# Patient Record
Sex: FEMALE | Race: WHITE | NOT HISPANIC OR LATINO | Employment: OTHER | URBAN - METROPOLITAN AREA
[De-identification: names, ages, dates, MRNs, and addresses within clinical notes are randomized per-mention and may not be internally consistent; named-entity substitution may affect disease eponyms.]

---

## 2018-02-01 ENCOUNTER — OFFICE VISIT - HEALTHEAST (OUTPATIENT)
Dept: CARDIOLOGY | Facility: CLINIC | Age: 68
End: 2018-02-01

## 2018-02-01 DIAGNOSIS — R07.9 CHEST PAIN, UNSPECIFIED TYPE: ICD-10-CM

## 2018-02-01 RX ORDER — AMLODIPINE BESYLATE 10 MG/1
10 TABLET ORAL DAILY
Status: SHIPPED | COMMUNITY
Start: 2018-01-30

## 2018-02-01 ASSESSMENT — MIFFLIN-ST. JEOR: SCORE: 1134.11

## 2018-02-06 ENCOUNTER — HOSPITAL ENCOUNTER (OUTPATIENT)
Dept: CARDIOLOGY | Facility: CLINIC | Age: 68
Discharge: HOME | End: 2018-02-06
Attending: INTERNAL MEDICINE

## 2018-02-06 LAB
CV STRESS CURRENT BP HE: NORMAL
CV STRESS CURRENT HR HE: 103
CV STRESS CURRENT HR HE: 104
CV STRESS CURRENT HR HE: 105
CV STRESS CURRENT HR HE: 107
CV STRESS CURRENT HR HE: 109
CV STRESS CURRENT HR HE: 109
CV STRESS CURRENT HR HE: 111
CV STRESS CURRENT HR HE: 115
CV STRESS CURRENT HR HE: 115
CV STRESS CURRENT HR HE: 123
CV STRESS CURRENT HR HE: 124
CV STRESS CURRENT HR HE: 129
CV STRESS CURRENT HR HE: 129
CV STRESS CURRENT HR HE: 130
CV STRESS CURRENT HR HE: 138
CV STRESS CURRENT HR HE: 141
CV STRESS CURRENT HR HE: 145
CV STRESS CURRENT HR HE: 145
CV STRESS CURRENT HR HE: 91
CV STRESS CURRENT HR HE: 94
CV STRESS CURRENT HR HE: 94
CV STRESS CURRENT HR HE: 98
CV STRESS DEVIATION TIME HE: NORMAL
CV STRESS ECHO PERCENT HR HE: NORMAL
CV STRESS EXERCISE STAGE HE: NORMAL
CV STRESS FINAL RESTING BP HE: NORMAL
CV STRESS FINAL RESTING HR HE: 103
CV STRESS MAX HR HE: 145
CV STRESS MAX TREADMILL GRADE HE: 12
CV STRESS MAX TREADMILL SPEED HE: 2.5
CV STRESS PEAK DIA BP HE: NORMAL
CV STRESS PEAK SYS BP HE: NORMAL
CV STRESS PHASE HE: NORMAL
CV STRESS PROTOCOL HE: NORMAL
CV STRESS RESTING PT POSITION HE: NORMAL
CV STRESS RESTING PT POSITION HE: NORMAL
CV STRESS ST DEVIATION AMOUNT HE: NORMAL
CV STRESS ST DEVIATION ELEVATION HE: NORMAL
CV STRESS ST EVELATION AMOUNT HE: NORMAL
CV STRESS TEST TYPE HE: NORMAL
CV STRESS TOTAL STAGE TIME MIN 1 HE: NORMAL
STRESS ECHO BASELINE BP: NORMAL
STRESS ECHO BASELINE HR: 92
STRESS ECHO CALCULATED PERCENT HR: 96 %
STRESS ECHO LAST STRESS BP: NORMAL
STRESS ECHO LAST STRESS HR: 145
STRESS ECHO POST ESTIMATED WORKLOAD: 6.4
STRESS ECHO POST EXERCISE DUR MIN: 4
STRESS ECHO POST EXERCISE DUR SEC: 35
STRESS ECHO TARGET HR: 128

## 2021-05-31 VITALS — HEIGHT: 64 IN | BODY MASS INDEX: 24.24 KG/M2 | WEIGHT: 142 LBS

## 2021-06-16 PROBLEM — Z91.148 NONCOMPLIANCE WITH MEDICATION REGIMEN: Status: ACTIVE | Noted: 2018-02-01

## 2021-06-26 ENCOUNTER — HEALTH MAINTENANCE LETTER (OUTPATIENT)
Age: 71
End: 2021-06-26

## 2021-06-26 NOTE — PROGRESS NOTES
"Progress Notes by Foster Hilario MD at 2018  3:50 PM     Author: Foster Hilario MD Service: -- Author Type: Physician    Filed: 2018  4:19 PM Encounter Date: 2018 Status: Signed    : Foster Hilario MD (Physician)           Click to link to [facility] Consultation Note    Thank you, Dr. Kenn Silverio, for asking Bita Stephenson to meet with me in consultation today at the [facility] to evaluate chest discomfort.     Assessment:    1. Chest pain, unspecified type  Echo Stress Exercise       Plan:    1.  We will call also with results of her exercise echocardiographic stress test.  2.  I advised her to follow-up with her primary care team regarding her blood pressure management.    An After Visit Summary was printed and given to the patient.    Current History:    Bita states she was put on blood pressure medications in  by her primary care team.  She reports that she forgot to renew the medications and did not take it for most of January.  She then developed headache, foggy feeling in her head, and intermittent nagging discomfort in her left shoulder and trapezial area and anxiety.  She was evaluated at the emergency room, treated, and released with plans to come to the rapid access clinic.  Her amlodipine was prescribed and she resumed taking it.  She feels much better today.    Bita confides that she may have ADHD as she always has lots of energy but has difficulty focusing on a single task.  She states that she cleans her house \"every day\".    Her mother had heart surgery for an unspecified condition at age 62 and  1 month later.  Her parents  when she was 1 month old and she does not know her biological father's whereabouts or health.  Her youngest son was recently diagnosed with diabetes.    Patient Active Problem List   Diagnosis   ? Essential hypertension   ? CKD (chronic kidney " "disease) stage 3, GFR 30-59 ml/min   ? Noncompliance with medication regimen       Past Medical History:  Past Medical History:   Diagnosis Date   ? Allergic rhinitis 2006   ? CKD (chronic kidney disease) stage 3, GFR 30-59 ml/min    ? Essential hypertension    ? Noncompliance with medication regimen 2018    She failed to refill her amlodipine and was without it for a month in .       Past Surgical History:  Past Surgical History:   Procedure Laterality Date   ? NO PAST SURGERIES         Family History:  Family History   Problem Relation Age of Onset   ? Sudden death Mother 62      at home, no autopsy   ? Heart disease Mother      she had an unspecified type of heart surgery one month before her death   ? No Medical Problems Son    ? No Medical Problems Son    ? Diabetes type II Son    ? No Medical Problems Daughter    ? No Medical Problems Daughter        Social History:   reports that she quit smoking about 17 years ago. Her smoking use included Cigarettes. She has a 42.00 pack-year smoking history. She has never used smokeless tobacco. She reports that she uses illicit drugs, including Marijuana. She reports that she does not drink alcohol.    Medications:  Outpatient Encounter Prescriptions as of 2018   Medication Sig Dispense Refill   ? amLODIPine (NORVASC) 10 MG tablet Take 10 mg by mouth.       No facility-administered encounter medications on file as of 2018.        Allergies:  Paroxetine and Valium [diazepam]    Review of Systems:     General: WNL  Eyes: WNL  Ears/Nose/Throat: WNL  Lungs: Snoring  Heart: Chest Pain, Arm Pain, Irregular Heartbeat  Stomach: Diarrhea, Heartburn  Bladder: Frequent Urination at Night  Muscle/Joints: Muscle Pain  Skin: WNL  Nervous System: WNL  Mental Health: Anxiety     Blood: WNL    Objective:    Wt Readings from Last 5 Encounters:   18 142 lb (64.4 kg)   18 142 lb (64.4 kg)      5' 4\" (1.626 m)  Body mass index is 24.37 " "kg/(m^2).  BP (!) 150/92 (Patient Site: Left Arm, Patient Position: Sitting, Cuff Size: Adult Regular)  Pulse 96  Resp 18  Ht 5' 4\" (1.626 m)  Wt 142 lb (64.4 kg)  BMI 24.37 kg/m2     Physical Exam:    General Appearance: Alert and not in distress   HEENT: No scleral icterus; the tongue is midline and the mucous membranes are pink and moist   Neck: No cervical bruits, adenopathy, or thyromegaly; jugular venous pressure is less than 5 cm   Chest: The spine is straight and the chest is symmetric   Lungs: Respirations are unlabored; the lungs are clear to auscultation   Cardiovasular: Auscultation reveals normal first and second heart sounds with no murmurs, rubs, or gallops; the carotid, radial, femoral, and posterior tibial pulses are intact   Abdomen: No organomegaly, masses, bruits, or tenderness; bowel sounds are present   Extremities: No cyanosis, clubbing or edema   Skin: No xanthelasma   Neurologic: Mood and affect are appropriate       Cardiac testing:    EKG: Sinus rhythm, normal EKG per my personal review.     Imaging:    No results found.    Lab Review:    Lab Results   Component Value Date     01/30/2018    K 4.0 01/30/2018     (H) 01/30/2018    CO2 21 (L) 01/30/2018    BUN 24 (H) 01/30/2018    CREATININE 1.20 (H) 01/30/2018    CALCIUM 9.5 01/30/2018     Lab Results   Component Value Date    WBC 8.2 01/30/2018    HGB 13.1 01/30/2018    HCT 40.1 01/30/2018    MCV 83 01/30/2018     01/30/2018           Much or all of the text in this note was generated through the use of the Dragon Dictate voice-to-text software. Errors in spelling or words which seem out of context are unintentional. Sound alike errors, in particular, may have escaped editing.              "

## 2021-08-19 ENCOUNTER — TRANSCRIBE ORDERS (OUTPATIENT)
Dept: OTHER | Age: 71
End: 2021-08-19

## 2021-08-19 DIAGNOSIS — H53.9 VISUAL DISTURBANCE: Primary | ICD-10-CM

## 2021-10-16 ENCOUNTER — HEALTH MAINTENANCE LETTER (OUTPATIENT)
Age: 71
End: 2021-10-16

## 2021-12-06 ENCOUNTER — APPOINTMENT (OUTPATIENT)
Dept: CT IMAGING | Facility: CLINIC | Age: 71
DRG: 689 | End: 2021-12-06
Attending: EMERGENCY MEDICINE
Payer: COMMERCIAL

## 2021-12-06 ENCOUNTER — HOSPITAL ENCOUNTER (INPATIENT)
Facility: CLINIC | Age: 71
LOS: 2 days | Discharge: HOME | DRG: 689 | End: 2021-12-08
Attending: EMERGENCY MEDICINE | Admitting: HOSPITALIST
Payer: COMMERCIAL

## 2021-12-06 DIAGNOSIS — R39.9 URINARY TRACT INFECTION SYMPTOMS: Primary | ICD-10-CM

## 2021-12-06 DIAGNOSIS — R50.9 FEVER, UNSPECIFIED FEVER CAUSE: ICD-10-CM

## 2021-12-06 DIAGNOSIS — R41.0 CONFUSION: ICD-10-CM

## 2021-12-06 DIAGNOSIS — R05.9 COUGH: ICD-10-CM

## 2021-12-06 DIAGNOSIS — N30.00 ACUTE CYSTITIS WITHOUT HEMATURIA: ICD-10-CM

## 2021-12-06 DIAGNOSIS — F12.90 MARIJUANA USE: ICD-10-CM

## 2021-12-06 PROBLEM — D72.825 BANDEMIA: Status: ACTIVE | Noted: 2021-12-06

## 2021-12-06 PROBLEM — L90.0 LICHEN SCLEROSUS: Status: ACTIVE | Noted: 2019-03-04

## 2021-12-06 LAB
ALBUMIN SERPL-MCNC: 3.8 G/DL (ref 3.5–5)
ALBUMIN UR-MCNC: 70 MG/DL
ALP SERPL-CCNC: 62 U/L (ref 45–120)
ALT SERPL W P-5'-P-CCNC: 10 U/L (ref 0–45)
AMPHETAMINES UR QL SCN: ABNORMAL
ANION GAP SERPL CALCULATED.3IONS-SCNC: 12 MMOL/L (ref 5–18)
APPEARANCE UR: CLEAR
AST SERPL W P-5'-P-CCNC: 20 U/L (ref 0–40)
ATRIAL RATE - MUSE: 107 BPM
BARBITURATES UR QL: ABNORMAL
BASE EXCESS BLDV CALC-SCNC: -0.7 MMOL/L
BASOPHILS # BLD AUTO: 0 10E3/UL (ref 0–0.2)
BASOPHILS NFR BLD AUTO: 0 %
BENZODIAZ UR QL: ABNORMAL
BILIRUB DIRECT SERPL-MCNC: 0.1 MG/DL
BILIRUB SERPL-MCNC: 0.3 MG/DL (ref 0–1)
BILIRUB UR QL STRIP: NEGATIVE
BUN SERPL-MCNC: 24 MG/DL (ref 8–28)
C REACTIVE PROTEIN LHE: 0.6 MG/DL (ref 0–0.8)
C REACTIVE PROTEIN LHE: 1 MG/DL (ref 0–0.8)
CALCIUM SERPL-MCNC: 9.1 MG/DL (ref 8.5–10.5)
CANNABINOIDS UR QL SCN: ABNORMAL
CHLORIDE BLD-SCNC: 105 MMOL/L (ref 98–107)
CO2 SERPL-SCNC: 21 MMOL/L (ref 22–31)
COCAINE UR QL: ABNORMAL
COLOR UR AUTO: COLORLESS
CREAT SERPL-MCNC: 1.52 MG/DL (ref 0.6–1.1)
CREAT UR-MCNC: 42 MG/DL
DIASTOLIC BLOOD PRESSURE - MUSE: 71 MMHG
EOSINOPHIL # BLD AUTO: 0 10E3/UL (ref 0–0.7)
EOSINOPHIL NFR BLD AUTO: 0 %
ERYTHROCYTE [DISTWIDTH] IN BLOOD BY AUTOMATED COUNT: 14.3 % (ref 10–15)
ETHANOL SERPL-MCNC: <10 MG/DL
FLUAV RNA SPEC QL NAA+PROBE: NEGATIVE
FLUBV RNA RESP QL NAA+PROBE: NEGATIVE
GFR SERPL CREATININE-BSD FRML MDRD: 34 ML/MIN/1.73M2
GLUCOSE BLD-MCNC: 126 MG/DL (ref 70–125)
GLUCOSE UR STRIP-MCNC: NEGATIVE MG/DL
HCO3 BLDV-SCNC: 22 MMOL/L (ref 24–30)
HCT VFR BLD AUTO: 36.6 % (ref 35–47)
HGB BLD-MCNC: 11.5 G/DL (ref 11.7–15.7)
HGB UR QL STRIP: ABNORMAL
HOLD SPECIMEN: NORMAL
IMM GRANULOCYTES # BLD: 0.1 10E3/UL
IMM GRANULOCYTES NFR BLD: 1 %
INTERPRETATION ECG - MUSE: NORMAL
KETONES UR STRIP-MCNC: 10 MG/DL
LACTATE SERPL-SCNC: 1.5 MMOL/L (ref 0.7–2)
LEUKOCYTE ESTERASE UR QL STRIP: NEGATIVE
LIPASE SERPL-CCNC: 29 U/L (ref 0–52)
LYMPHOCYTES # BLD AUTO: 0.5 10E3/UL (ref 0.8–5.3)
LYMPHOCYTES NFR BLD AUTO: 3 %
MAGNESIUM SERPL-MCNC: 2 MG/DL (ref 1.8–2.6)
MAGNESIUM SERPL-MCNC: 2.1 MG/DL (ref 1.8–2.6)
MCH RBC QN AUTO: 26.5 PG (ref 26.5–33)
MCHC RBC AUTO-ENTMCNC: 31.4 G/DL (ref 31.5–36.5)
MCV RBC AUTO: 84 FL (ref 78–100)
METHADONE UR QL SCN: ABNORMAL
MONOCYTES # BLD AUTO: 0.2 10E3/UL (ref 0–1.3)
MONOCYTES NFR BLD AUTO: 1 %
MUCOUS THREADS #/AREA URNS LPF: PRESENT /LPF
NEUTROPHILS # BLD AUTO: 16.6 10E3/UL (ref 1.6–8.3)
NEUTROPHILS NFR BLD AUTO: 95 %
NITRATE UR QL: NEGATIVE
NRBC # BLD AUTO: 0 10E3/UL
NRBC BLD AUTO-RTO: 0 /100
OPIATES UR QL SCN: ABNORMAL
OXYCODONE UR QL: ABNORMAL
OXYHGB MFR BLDV: 28.9 % (ref 70–75)
P AXIS - MUSE: 72 DEGREES
PCO2 BLDV: 44 MM HG (ref 35–50)
PCP UR QL SCN: ABNORMAL
PH BLDV: 7.36 [PH] (ref 7.35–7.45)
PH UR STRIP: 6 [PH] (ref 5–7)
PHOSPHATE SERPL-MCNC: 2.7 MG/DL (ref 2.5–4.5)
PLATELET # BLD AUTO: 313 10E3/UL (ref 150–450)
PO2 BLDV: 18 MM HG (ref 25–47)
POTASSIUM BLD-SCNC: 4.4 MMOL/L (ref 3.5–5)
PR INTERVAL - MUSE: 150 MS
PROCALCITONIN SERPL-MCNC: 0.07 NG/ML (ref 0–0.49)
PROT SERPL-MCNC: 7.5 G/DL (ref 6–8)
QRS DURATION - MUSE: 84 MS
QT - MUSE: 342 MS
QTC - MUSE: 456 MS
R AXIS - MUSE: -54 DEGREES
RBC # BLD AUTO: 4.34 10E6/UL (ref 3.8–5.2)
RBC URINE: 15 /HPF
SAO2 % BLDV: 29.4 % (ref 70–75)
SARS-COV-2 RNA RESP QL NAA+PROBE: NEGATIVE
SODIUM SERPL-SCNC: 138 MMOL/L (ref 136–145)
SP GR UR STRIP: 1.01 (ref 1–1.03)
SQUAMOUS EPITHELIAL: <1 /HPF
SYSTOLIC BLOOD PRESSURE - MUSE: 139 MMHG
T AXIS - MUSE: 77 DEGREES
TROPONIN I SERPL-MCNC: 0.27 NG/ML (ref 0–0.29)
TSH SERPL DL<=0.005 MIU/L-ACNC: 1.35 UIU/ML (ref 0.3–5)
TSH SERPL DL<=0.005 MIU/L-ACNC: 2.37 UIU/ML (ref 0.3–5)
UROBILINOGEN UR STRIP-MCNC: <2 MG/DL
VENTRICULAR RATE- MUSE: 107 BPM
VIT B12 SERPL-MCNC: 330 PG/ML (ref 213–816)
WBC # BLD AUTO: 17.5 10E3/UL (ref 4–11)
WBC URINE: 1 /HPF

## 2021-12-06 PROCEDURE — 120N000001 HC R&B MED SURG/OB

## 2021-12-06 PROCEDURE — 250N000011 HC RX IP 250 OP 636: Performed by: EMERGENCY MEDICINE

## 2021-12-06 PROCEDURE — 82746 ASSAY OF FOLIC ACID SERUM: CPT | Performed by: HOSPITALIST

## 2021-12-06 PROCEDURE — 250N000013 HC RX MED GY IP 250 OP 250 PS 637: Performed by: EMERGENCY MEDICINE

## 2021-12-06 PROCEDURE — 93005 ELECTROCARDIOGRAM TRACING: CPT | Performed by: EMERGENCY MEDICINE

## 2021-12-06 PROCEDURE — 82805 BLOOD GASES W/O2 SATURATION: CPT | Performed by: EMERGENCY MEDICINE

## 2021-12-06 PROCEDURE — 84100 ASSAY OF PHOSPHORUS: CPT | Performed by: HOSPITALIST

## 2021-12-06 PROCEDURE — 83735 ASSAY OF MAGNESIUM: CPT | Performed by: EMERGENCY MEDICINE

## 2021-12-06 PROCEDURE — 71250 CT THORAX DX C-: CPT

## 2021-12-06 PROCEDURE — 83690 ASSAY OF LIPASE: CPT | Performed by: EMERGENCY MEDICINE

## 2021-12-06 PROCEDURE — 82077 ASSAY SPEC XCP UR&BREATH IA: CPT | Performed by: EMERGENCY MEDICINE

## 2021-12-06 PROCEDURE — 36415 COLL VENOUS BLD VENIPUNCTURE: CPT | Performed by: EMERGENCY MEDICINE

## 2021-12-06 PROCEDURE — 87086 URINE CULTURE/COLONY COUNT: CPT | Performed by: EMERGENCY MEDICINE

## 2021-12-06 PROCEDURE — 36415 COLL VENOUS BLD VENIPUNCTURE: CPT | Performed by: HOSPITALIST

## 2021-12-06 PROCEDURE — 86141 C-REACTIVE PROTEIN HS: CPT | Performed by: HOSPITALIST

## 2021-12-06 PROCEDURE — 84443 ASSAY THYROID STIM HORMONE: CPT | Performed by: EMERGENCY MEDICINE

## 2021-12-06 PROCEDURE — 80048 BASIC METABOLIC PNL TOTAL CA: CPT | Performed by: EMERGENCY MEDICINE

## 2021-12-06 PROCEDURE — 70450 CT HEAD/BRAIN W/O DYE: CPT

## 2021-12-06 PROCEDURE — 84484 ASSAY OF TROPONIN QUANT: CPT | Performed by: EMERGENCY MEDICINE

## 2021-12-06 PROCEDURE — 84443 ASSAY THYROID STIM HORMONE: CPT | Performed by: HOSPITALIST

## 2021-12-06 PROCEDURE — C9803 HOPD COVID-19 SPEC COLLECT: HCPCS

## 2021-12-06 PROCEDURE — 258N000003 HC RX IP 258 OP 636: Performed by: HOSPITALIST

## 2021-12-06 PROCEDURE — 96366 THER/PROPH/DIAG IV INF ADDON: CPT

## 2021-12-06 PROCEDURE — 99223 1ST HOSP IP/OBS HIGH 75: CPT | Performed by: HOSPITALIST

## 2021-12-06 PROCEDURE — 82607 VITAMIN B-12: CPT | Performed by: HOSPITALIST

## 2021-12-06 PROCEDURE — 85025 COMPLETE CBC W/AUTO DIFF WBC: CPT | Performed by: EMERGENCY MEDICINE

## 2021-12-06 PROCEDURE — 99285 EMERGENCY DEPT VISIT HI MDM: CPT | Mod: 25

## 2021-12-06 PROCEDURE — 96365 THER/PROPH/DIAG IV INF INIT: CPT

## 2021-12-06 PROCEDURE — 258N000003 HC RX IP 258 OP 636: Performed by: EMERGENCY MEDICINE

## 2021-12-06 PROCEDURE — 83605 ASSAY OF LACTIC ACID: CPT | Performed by: EMERGENCY MEDICINE

## 2021-12-06 PROCEDURE — 82248 BILIRUBIN DIRECT: CPT | Performed by: EMERGENCY MEDICINE

## 2021-12-06 PROCEDURE — 87636 SARSCOV2 & INF A&B AMP PRB: CPT | Performed by: EMERGENCY MEDICINE

## 2021-12-06 PROCEDURE — 84145 PROCALCITONIN (PCT): CPT | Performed by: EMERGENCY MEDICINE

## 2021-12-06 PROCEDURE — 80307 DRUG TEST PRSMV CHEM ANLYZR: CPT | Performed by: EMERGENCY MEDICINE

## 2021-12-06 PROCEDURE — 87040 BLOOD CULTURE FOR BACTERIA: CPT | Performed by: EMERGENCY MEDICINE

## 2021-12-06 PROCEDURE — 96361 HYDRATE IV INFUSION ADD-ON: CPT

## 2021-12-06 PROCEDURE — 83735 ASSAY OF MAGNESIUM: CPT | Performed by: HOSPITALIST

## 2021-12-06 PROCEDURE — 86141 C-REACTIVE PROTEIN HS: CPT | Performed by: EMERGENCY MEDICINE

## 2021-12-06 PROCEDURE — 81003 URINALYSIS AUTO W/O SCOPE: CPT | Performed by: EMERGENCY MEDICINE

## 2021-12-06 RX ORDER — ACETAMINOPHEN 650 MG/1
650 SUPPOSITORY RECTAL EVERY 6 HOURS PRN
Status: DISCONTINUED | OUTPATIENT
Start: 2021-12-06 | End: 2021-12-08 | Stop reason: HOSPADM

## 2021-12-06 RX ORDER — SODIUM CHLORIDE 9 MG/ML
INJECTION, SOLUTION INTRAVENOUS CONTINUOUS
Status: DISCONTINUED | OUTPATIENT
Start: 2021-12-06 | End: 2021-12-08 | Stop reason: HOSPADM

## 2021-12-06 RX ORDER — FAMOTIDINE 20 MG/1
20 TABLET, FILM COATED ORAL DAILY
Status: DISCONTINUED | OUTPATIENT
Start: 2021-12-07 | End: 2021-12-08 | Stop reason: HOSPADM

## 2021-12-06 RX ORDER — LANOLIN ALCOHOL/MO/W.PET/CERES
3 CREAM (GRAM) TOPICAL
COMMUNITY

## 2021-12-06 RX ORDER — ONDANSETRON 2 MG/ML
4 INJECTION INTRAMUSCULAR; INTRAVENOUS EVERY 6 HOURS PRN
Status: DISCONTINUED | OUTPATIENT
Start: 2021-12-06 | End: 2021-12-08 | Stop reason: HOSPADM

## 2021-12-06 RX ORDER — ACETAMINOPHEN 325 MG/1
975 TABLET ORAL ONCE
Status: DISCONTINUED | OUTPATIENT
Start: 2021-12-06 | End: 2021-12-06

## 2021-12-06 RX ORDER — ACETAMINOPHEN 325 MG/1
650 TABLET ORAL EVERY 6 HOURS PRN
Status: DISCONTINUED | OUTPATIENT
Start: 2021-12-06 | End: 2021-12-08 | Stop reason: HOSPADM

## 2021-12-06 RX ORDER — POLYETHYLENE GLYCOL 3350 17 G/17G
17 POWDER, FOR SOLUTION ORAL DAILY PRN
Status: DISCONTINUED | OUTPATIENT
Start: 2021-12-06 | End: 2021-12-08 | Stop reason: HOSPADM

## 2021-12-06 RX ORDER — CEFTRIAXONE 1 G/1
1 INJECTION, POWDER, FOR SOLUTION INTRAMUSCULAR; INTRAVENOUS EVERY 24 HOURS
Status: DISCONTINUED | OUTPATIENT
Start: 2021-12-07 | End: 2021-12-08 | Stop reason: HOSPADM

## 2021-12-06 RX ORDER — CEFTRIAXONE 2 G/1
2 INJECTION, POWDER, FOR SOLUTION INTRAMUSCULAR; INTRAVENOUS ONCE
Status: COMPLETED | OUTPATIENT
Start: 2021-12-06 | End: 2021-12-06

## 2021-12-06 RX ORDER — LIDOCAINE 40 MG/G
CREAM TOPICAL
Status: DISCONTINUED | OUTPATIENT
Start: 2021-12-06 | End: 2021-12-08 | Stop reason: HOSPADM

## 2021-12-06 RX ORDER — FAMOTIDINE 20 MG/1
20 TABLET, FILM COATED ORAL 2 TIMES DAILY PRN
COMMUNITY

## 2021-12-06 RX ORDER — ONDANSETRON 4 MG/1
4 TABLET, ORALLY DISINTEGRATING ORAL EVERY 6 HOURS PRN
Status: DISCONTINUED | OUTPATIENT
Start: 2021-12-06 | End: 2021-12-08 | Stop reason: HOSPADM

## 2021-12-06 RX ORDER — ACETAMINOPHEN 650 MG/1
650 SUPPOSITORY RECTAL ONCE
Status: COMPLETED | OUTPATIENT
Start: 2021-12-06 | End: 2021-12-06

## 2021-12-06 RX ORDER — AMLODIPINE BESYLATE 10 MG/1
10 TABLET ORAL DAILY
Status: DISCONTINUED | OUTPATIENT
Start: 2021-12-07 | End: 2021-12-08 | Stop reason: HOSPADM

## 2021-12-06 RX ORDER — LORAZEPAM 2 MG/ML
1 INJECTION INTRAMUSCULAR ONCE
Status: COMPLETED | OUTPATIENT
Start: 2021-12-06 | End: 2021-12-06

## 2021-12-06 RX ADMIN — CEFTRIAXONE SODIUM 2 G: 2 INJECTION, POWDER, FOR SOLUTION INTRAMUSCULAR; INTRAVENOUS at 19:10

## 2021-12-06 RX ADMIN — SODIUM CHLORIDE 500 ML: 9 INJECTION, SOLUTION INTRAVENOUS at 17:53

## 2021-12-06 RX ADMIN — ACETAMINOPHEN 650 MG: 650 SUPPOSITORY RECTAL at 17:53

## 2021-12-06 RX ADMIN — LORAZEPAM 1 MG: 2 INJECTION INTRAMUSCULAR; INTRAVENOUS at 14:55

## 2021-12-06 RX ADMIN — SODIUM CHLORIDE: 9 INJECTION, SOLUTION INTRAVENOUS at 22:24

## 2021-12-06 ASSESSMENT — ACTIVITIES OF DAILY LIVING (ADL)
ADLS_ACUITY_SCORE: 5
DEPENDENT_IADLS:: INDEPENDENT

## 2021-12-06 ASSESSMENT — ENCOUNTER SYMPTOMS: COUGH: 1

## 2021-12-06 ASSESSMENT — MIFFLIN-ST. JEOR: SCORE: 1025.19

## 2021-12-06 NOTE — ED PROVIDER NOTES
"EMERGENCY DEPARTMENT ENCOUNTER      NAME: Bita Stephenson  AGE: 71 year old female  YOB: 1950  MRN: [id number]  EVALUATION DATE & TIME: 12/6/2021  1:23 PM    PCP: Rebekah Cain    ED PROVIDER: Nikunj Ramos M.D.      Chief Complaint   Patient presents with     Altered Mental Status         IMPRESSION  1. Confusion    2. Cough    3. Fever, unspecified fever cause    4. Marijuana use        PLAN  - admit to hospitalist for further care; med/surg admit    ED COURSE & MEDICAL DECISION MAKING    ED Course as of 12/06/21 1755   Mon Dec 06, 2021   1336 71yoF with history of HTN, CKD3, s/p COVID-19 vaccine x2 presenting per EMS from home (lives with daughter) for evaluation of confusion and cough. Daughter reports patient did not get up for breakfast as usual this morning (went to bed last night feeling fine with no symptoms yesterday); then found in bed incontinence of stool & urine with a dry cough. Patient stated to her that \"she felt sick\" and daughter should call 911. EMS called; hemodynamically stable en route to the ED. Daughter notes patient is not acting her usual self still; now not talking to daughter but does look at her.    Temp 38.2C on presentation with HR 110s, satting well on room air. Makes eye contact on exam and turns over in bed during exam; wants to be left alone. Moves head freely with no meningismus. Has clear lungs with normal work of breathing, benign abdomen with no tenderness, no peripheral edema or calf tenderness, no rash. Concern for infectious process high; COVID-19 high given cough and fever. Mild encephalopathy but moves head freely with no meningismus; low concern for meningitis. Will obtain CT scans given AMS along with COVID-19 swab, blood, urine. Will give Tylenol for fever; suspect this driving mild tachycardia. Will defer initial IVF with no clinical dehydration on exam and high concern for COVID-19. Daughter comfortable with this plan; no further questions at " "this time.   1409 Lactate 1.5; argues against severe sepsis or septic shock currently ongoing.   1448 Patient hitting at staff with attempts to lay her flat in bed. BP stable and normal work of breathing. Will give Ativan to facilitate obtaining CT scans.   1457 UA clear with no UTI.   1513 VBG unremarkable with pH 7.36 with pCO2 44; not CO2 narcosis. Labs otherwise with baseline creatinine at 1.5 with no glaring electrolyte abnormality, normal bilirubin/LFTs/lipase, TSH within normal limits. WBC 17.5 but CRP just 0.6 and procal 0.07; argues against bacteremia or bacterial pneumonia.   1550 COVID-19/influenza swab negative; has just had <1 day of symptoms though with otherwise no clear etiology to fever. May be false negative.   1609 CT head with no acute abnormality.   1645 CT chest/abdomen/pelvis with no acute abnormality or explanatory pathology either; no infiltrate, pulmonary edema, colitis, diverticulitis, perforation, SBO; unremarkable. Patient resting comfortably on recheck; says \"go away\", bundles up in sheets, moves all limbs, still moves head freely with no meningismus.   1731 I called lab about troponin (drawn at 1:53pm but not yet in process); tech states they will start running it now.   1750 UDS with cannabinoids; may be playing a role. Consulted hospitalist for admission; they agreed. Did request that Rocephin be given for possible UTI; this was done.       1:37 PM I met with the patient for the initial interview and physical examination. Discussed plan for treatment and workup in the ED.      I wore the following PPE during this patient encounter:  N95 mask, face shield w/ eye protection, gloves, and gown.    MEDICATIONS GIVEN IN THE EMERGENCY DEPARTMENT  Medications   cefTRIAXone (ROCEPHIN) 2 g vial to attach to  ml bag for ADULTS or NS 50 ml bag for PEDS (has no administration in time range)   LORazepam (ATIVAN) injection 1 mg (1 mg Intravenous Given 12/6/21 1455)   acetaminophen (TYLENOL) " "Suppository 650 mg (650 mg Rectal Given 12/6/21 1753)   0.9% sodium chloride BOLUS (500 mLs Intravenous New Bag 12/6/21 1753)             =================================================================      HPI  Patient information was obtained from: Patient's daughter    Use of : N/A         Bita Stephenson is a 71 year old female with a pertinent history of HTN and CKD who presents to this ED via EMS for evaluation of altered mental status.    Daughter reports patient did not get up for breakfast as usual this morning (went to bed last night feeling fine with no symptoms yesterday); then found in bed incontinence of stool & urine with a dry cough. Patient stated to her that \"she felt sick\" and daughter should call 911. EMS called; hemodynamically stable en route to the ED. Daughter notes patient is not acting her usual self still; now not talking to daughter but does look at her.    REVIEW OF SYSTEMS   Review of Systems   Unable to perform ROS: Mental status change   Respiratory: Positive for cough.    Genitourinary:        Positive for incontinence of stool & urine in bed this morning.       --------------- MEDICAL HISTORY ---------------  PAST MEDICAL HISTORY:  No past medical history on file.  Patient Active Problem List   Diagnosis     Essential hypertension     CKD (chronic kidney disease) stage 3, GFR 30-59 ml/min (H)     Noncompliance with medication regimen     Allergic rhinitis     Lichen sclerosus     Thoracic sprain     Cough     Confusion     Fever, unspecified fever cause       PAST SURGICAL HISTORY:  Past Surgical History:   Procedure Laterality Date     NO PAST SURGERIES         CURRENT MEDICATIONS:      Current Facility-Administered Medications:      cefTRIAXone (ROCEPHIN) 2 g vial to attach to  ml bag for ADULTS or NS 50 ml bag for PEDS, 2 g, Intravenous, Once, Nikunj Ramos MD    Current Outpatient Medications:      amLODIPine (NORVASC) 10 MG tablet, Take 10 mg by mouth " "daily , Disp: , Rfl:      famotidine (PEPCID) 20 MG tablet, Take 20 mg by mouth 2 times daily as needed, Disp: , Rfl:      melatonin 3 MG tablet, Take 3 mg by mouth nightly as needed for sleep, Disp: , Rfl:     ALLERGIES:  Allergies   Allergen Reactions     Paroxetine Itching     tingling     Sulfa Drugs      Other reaction(s): *Unknown     Valium [Diazepam] Itching       FAMILY HISTORY:  Family History   Problem Relation Age of Onset     Sudden Death Mother 62.00         at home, no autopsy     Heart Disease Mother         she had an unspecified type of heart surgery one month before her death     No Known Problems Son      No Known Problems Son      Diabetes Type 2  Son      No Known Problems Daughter      No Known Problems Daughter        SOCIAL HISTORY:   Social History     Socioeconomic History     Marital status:      Spouse name: Not on file     Number of children: 5     Years of education: Not on file     Highest education level: Not on file   Occupational History     Not on file   Tobacco Use     Smoking status: Former Smoker     Packs/day: 1.50     Years: 28.00     Pack years: 42.00     Types: Cigarettes, Cigarettes     Quit date: 2001     Years since quittin.8     Smokeless tobacco: Never Used   Substance and Sexual Activity     Alcohol use: No     Comment: Alcoholic Drinks/day: quit drinking in 2017     Drug use: Yes     Types: Marijuana     Comment: Drug use: \"occasional\"     Sexual activity: Not Currently     Partners: Male   Other Topics Concern     Not on file   Social History Narrative    Her youngest daughter, Alice, lives with her in [facility] home.     Social Determinants of Health     Financial Resource Strain: Not on file   Food Insecurity: Not on file   Transportation Needs: Not on file   Physical Activity: Not on file   Stress: Not on file   Social Connections: Not on file   Intimate Partner Violence: Not on file   Housing Stability: Not on file         --------------- " "PHYSICAL EXAM ---------------  Nursing notes and vitals reviewed by me.  VITALS:  Vitals:    12/06/21 1320 12/06/21 1449 12/06/21 1500   BP:  139/71    Pulse: 115     Resp: 16     Temp: (!) 100.8  F (38.2  C)     TempSrc: Temporal     SpO2: 98%     Height:   1.588 m (5' 2.5\")       PHYSICAL EXAM:    General:  Elderly female lying comfortably in bed, no distress, wants to roll over to prone position during exam (moans & shakes her head with attempts at moving her during exam)  Eyes:  conjunctivae clear, conjugate gaze, PERRL @ 2mm, no nystagmus  HENT:  atraumatic, nose with no rhinorrhea, oropharynx clear, moist mucous membranes  Neck:  no meningismus  Cardiovascular:  HR 110s during exam, regular rhythm, no murmurs, brisk cap refill  Chest:  no chest wall tenderness  Pulmonary:  no stridor, normal work of breathing, clear lungs bilaterally  Abdomen:  soft, nondistended, nontender  :  no CVA tenderness  Back:  no midline spinal tenderness  Musculoskeletal:  no pretibial edema, no calf tenderness. Gross ROM intact to joints of extremities with no obvious deformities.  Skin:  warm, dry, no rash  Neuro:  awake, makes good eye contact, moves all limbs, GCS 11 (E4, M5, V2), no tremor, no ankle clonus  Psych:  calm      --------------- RESULTS ---------------  EKG:    Reviewed and interpreted by me.  sinus tachycardia at 107bpm, no ST or T wave changes, normal intervals  Increased HR from prior (98bpm) with no other notable changes from 1/30/2018  My read.    LAB:  Reviewed and interpreted by me.  Results for orders placed or performed during the hospital encounter of 12/06/21   Head CT w/o contrast    Impression    IMPRESSION:  1.  No CT evidence for acute intracranial process.  2.  Brain atrophy and presumed chronic microvascular ischemic changes as above.   CT Chest Abdomen Pelvis w/o Contrast    Impression    IMPRESSION:  1.  The lungs are clear of focal consolidation. There is no pneumothorax, pleural effusion or " thoracic aortic aneurysm. Mild paraseptal emphysema.  2.  No bowel obstruction, free intraperitoneal air, appendicitis diverticulitis or abscess. There is sigmoid diverticulosis.  3.  No ovarian cyst or radiopaque gallstone or ureteral stone. No abdominal aortic aneurysm.  4.  Mild degenerative change lower lumbar spine.   Symptomatic Influenza A/B & SARS-CoV2 (COVID-19) Virus PCR Multiplex Nasopharyngeal    Specimen: Nasopharyngeal; Swab   Result Value Ref Range    Influenza A PCR Negative Negative    Influenza B PCR Negative Negative    SARS CoV2 PCR Negative Negative   Basic metabolic panel   Result Value Ref Range    Sodium 138 136 - 145 mmol/L    Potassium 4.4 3.5 - 5.0 mmol/L    Chloride 105 98 - 107 mmol/L    Carbon Dioxide (CO2) 21 (L) 22 - 31 mmol/L    Anion Gap 12 5 - 18 mmol/L    Urea Nitrogen 24 8 - 28 mg/dL    Creatinine 1.52 (H) 0.60 - 1.10 mg/dL    Calcium 9.1 8.5 - 10.5 mg/dL    Glucose 126 (H) 70 - 125 mg/dL    GFR Estimate 34 (L) >60 mL/min/1.73m2   Hepatic function panel   Result Value Ref Range    Bilirubin Total 0.3 0.0 - 1.0 mg/dL    Bilirubin Direct 0.1 <=0.5 mg/dL    Protein Total 7.5 6.0 - 8.0 g/dL    Albumin 3.8 3.5 - 5.0 g/dL    Alkaline Phosphatase 62 45 - 120 U/L    AST 20 0 - 40 U/L    ALT 10 0 - 45 U/L   Result Value Ref Range    Magnesium 2.0 1.8 - 2.6 mg/dL   TSH with free T4 reflex   Result Value Ref Range    TSH 2.37 0.30 - 5.00 uIU/mL   CRP inflammation   Result Value Ref Range    CRP 0.6 0.0-<0.8 mg/dL   Result Value Ref Range    Procalcitonin 0.07 0.00 - 0.49 ng/mL   Blood gas venous   Result Value Ref Range    pH Venous 7.36 7.35 - 7.45    pCO2 Venous 44 35 - 50 mm Hg    pO2 Venous 18 (L) 25 - 47 mm Hg    Bicarbonate Venous 22 (L) 24 - 30 mmol/L    Base Excess/Deficit (+/-) -0.7   mmol/L    Oxyhemoglobin Venous 28.9 (L) 70.0 - 75.0 %    O2 Sat, Venous 29.4 (L) 70.0 - 75.0 %   Lactic acid whole blood   Result Value Ref Range    Lactic Acid 1.5 0.7 - 2.0 mmol/L   Result Value Ref  Range    Lipase 29 0 - 52 U/L   UA with Microscopic reflex to Culture    Specimen: Urine, Catheter   Result Value Ref Range    Color Urine Colorless Colorless, Straw, Light Yellow, Yellow    Appearance Urine Clear Clear    Glucose Urine Negative Negative mg/dL    Bilirubin Urine Negative Negative    Ketones Urine 10  (A) Negative mg/dL    Specific Gravity Urine 1.011 1.001 - 1.030    Blood Urine 0.5 mg/dL (A) Negative    pH Urine 6.0 5.0 - 7.0    Protein Albumin Urine 70  (A) Negative mg/dL    Urobilinogen Urine <2.0 <2.0 mg/dL    Nitrite Urine Negative Negative    Leukocyte Esterase Urine Negative Negative    Mucus Urine Present (A) None Seen /LPF    RBC Urine 15 (H) <=2 /HPF    WBC Urine 1 <=5 /HPF    Squamous Epithelials Urine <1 <=1 /HPF   CBC with platelets and differential   Result Value Ref Range    WBC Count 17.5 (H) 4.0 - 11.0 10e3/uL    RBC Count 4.34 3.80 - 5.20 10e6/uL    Hemoglobin 11.5 (L) 11.7 - 15.7 g/dL    Hematocrit 36.6 35.0 - 47.0 %    MCV 84 78 - 100 fL    MCH 26.5 26.5 - 33.0 pg    MCHC 31.4 (L) 31.5 - 36.5 g/dL    RDW 14.3 10.0 - 15.0 %    Platelet Count 313 150 - 450 10e3/uL    % Neutrophils 95 %    % Lymphocytes 3 %    % Monocytes 1 %    % Eosinophils 0 %    % Basophils 0 %    % Immature Granulocytes 1 %    NRBCs per 100 WBC 0 <1 /100    Absolute Neutrophils 16.6 (H) 1.6 - 8.3 10e3/uL    Absolute Lymphocytes 0.5 (L) 0.8 - 5.3 10e3/uL    Absolute Monocytes 0.2 0.0 - 1.3 10e3/uL    Absolute Eosinophils 0.0 0.0 - 0.7 10e3/uL    Absolute Basophils 0.0 0.0 - 0.2 10e3/uL    Absolute Immature Granulocytes 0.1 <=0.4 10e3/uL    Absolute NRBCs 0.0 10e3/uL   Result Value Ref Range    Troponin I 0.27 0.00 - 0.29 ng/mL   Drugs of Abuse 1+ Panel, Urine ([facility])   Result Value Ref Range    Amphetamines Urine Screen Negative Screen Negative    Benzodiazepines Urine Screen Negative Screen Negative    Opiates Urine Screen Negative Screen Negative    PCP Urine Screen Negative Screen Negative     Cannabinoids Urine Screen Positive (A) Screen Negative    Barbiturates Urine Screen Negative Screen Negative    Cocaine Urine Screen Negative Screen Negative    Methadone Urine Screen Negative Screen Negative    Oxycodone Urine Screen Negative Screen Negative    Creatinine Urine mg/dL 42 mg/dL   ECG 12-LEAD WITH MUSE ([facility])   Result Value Ref Range    Systolic Blood Pressure 139 mmHg    Diastolic Blood Pressure 71 mmHg    Ventricular Rate 107 BPM    Atrial Rate 107 BPM    CT Interval 150 ms    QRS Duration 84 ms     ms    QTc 456 ms    P Axis 72 degrees    R AXIS -54 degrees    T Axis 77 degrees    Interpretation ECG       Sinus tachycardia  Left axis deviation  Septal infarct , age undetermined  Abnormal ECG  When compared with ECG of 30-JAN-2018 19:31,  Septal infarct is now Present  Confirmed by SEE ED PROVIDER NOTE FOR, ECG INTERPRETATION ([id number]),  JOVANY ESPARZA ([id number]) on 12/6/2021 4:25:40 PM         RADIOLOGY:  Reviewed by me. Please see official radiology report.  Recent Results (from the past 24 hour(s))   Head CT w/o contrast    Narrative    EXAM: CT HEAD W/O CONTRAST  LOCATION: [facility]  DATE/TIME: 12/6/2021 3:16 PM    INDICATION: Confusion. Altered mental status.  COMPARISON: None.  TECHNIQUE: Routine CT Head without IV contrast. Multiplanar reformats. Dose reduction techniques were used.    FINDINGS:  INTRACRANIAL CONTENTS: No intracranial hemorrhage, extraaxial collection, or mass effect.  Chronic lacunar infarct in the left thalamus. Mild presumed chronic small vessel ischemic changes. Mild generalized volume loss. No hydrocephalus.     VISUALIZED ORBITS/SINUSES/MASTOIDS: No intraorbital abnormality. No paranasal sinus mucosal disease. No middle ear or mastoid effusion.    BONES/SOFT TISSUES: No acute abnormality.      Impression    IMPRESSION:  1.  No CT evidence for acute intracranial process.  2.  Brain atrophy and presumed chronic microvascular ischemic changes  as above.   CT Chest Abdomen Pelvis w/o Contrast    Narrative    EXAM: CT CHEST ABDOMEN PELVIS W/O CONTRAST  LOCATION: [facility]  DATE/TIME: 12/6/2021 3:16 PM    INDICATION: Cough and confusion.  COMPARISON: None.  TECHNIQUE: CT scan of the chest, abdomen, and pelvis was performed without IV contrast. Multiplanar reformats were obtained. Dose reduction techniques were used.   CONTRAST: None.    FINDINGS:   LUNGS AND PLEURA: No focal consolidation, pneumothorax or pleural effusion. There is mild linear atelectasis or scarring in the inferior lingula. No no suspicious nodules or masses. Mild paraseptal emphysema in the upper lobes. Small calcified nodule in   the left lower lobe on image 105 of series 4 likely represents a granuloma.    MEDIASTINUM/AXILLAE: No lymphadenopathy. Normal heart size. No pericardial effusion. No thoracic aortic aneurysm.    CORONARY ARTERY CALCIFICATION: Mild.    HEPATOBILIARY: Probable small cyst in the left lobe of the liver measuring 6 mm normal calcified gallstones or biliary dilatation.    PANCREAS: Normal.    SPLEEN: Normal.    ADRENAL GLANDS: Normal.    KIDNEYS/BLADDER: Probable small vascular calcification in the right kidney on image 144 of series 3 no hydronephrosis or ureteral stones no abnormal renal masses. No bladder stone or mass.    BOWEL: No bowel obstruction or free intraperitoneal air. Sigmoid diverticulosis without evidence for active diverticulitis or abscess. No evidence for appendicitis.    LYMPH NODES: Normal.    VASCULATURE: Moderate aortoiliac calcification without aneurysm.    PELVIC ORGANS: Normal.    MUSCULOSKELETAL: Mild degenerative change in the spine with disc space narrowing at L5-S1 no fracture or suspicious bony lesion.      Impression    IMPRESSION:  1.  The lungs are clear of focal consolidation. There is no pneumothorax, pleural effusion or thoracic aortic aneurysm. Mild paraseptal emphysema.  2.  No bowel obstruction, free  intraperitoneal air, appendicitis diverticulitis or abscess. There is sigmoid diverticulosis.  3.  No ovarian cyst or radiopaque gallstone or ureteral stone. No abdominal aortic aneurysm.  4.  Mild degenerative change lower lumbar spine.           I, Miguel Ángel Hawk, am serving as a scribe to document services personally performed by Dr. Nikunj Ramos based on my observation and the provider's statements to me. I, Nikunj Ramos MD attest that Miguel Ángel Hawk is acting in a scribe capacity, has observed my performance of the services and has documented them in accordance with my direction.      Nikunj Ramos MD  12/06/21  Emergency Medicine  [facility] EMERGENCY ROOM  [address] [address] [phone]  Dept: [phone]       Nikunj Ramos MD  12/06/21 3116       Nikunj Ramos MD  12/06/21 4127

## 2021-12-06 NOTE — PHARMACY-ADMISSION MEDICATION HISTORY
Pharmacy Note - Admission Medication History    Pertinent Provider Information: N/A     ______________________________________________________________________    Prior To Admission (PTA) med list completed and updated in EMR.       PTA Med List   Medication Sig Last Dose     amLODIPine (NORVASC) 10 MG tablet Take 10 mg by mouth daily  Unknown at Unknown time     famotidine (PEPCID) 20 MG tablet Take 20 mg by mouth 2 times daily as needed Unknown at Unknown time     melatonin 3 MG tablet Take 3 mg by mouth nightly as needed for sleep Unknown at Unknown time       Information source(s): Family member and CareEverywhere/SureScripts  Method of interview communication: phone    Summary of Changes to PTA Med List  New: melatonin, Pepcid  Discontinued: N/A  Changed: N/A    Patient was asked about OTC/herbal products specifically.  PTA med list reflects this.    In the past week, patient estimated taking medication this percent of the time:  Unable to assess    Allergies were reviewed, assessed, and updated with the patient.      Patient does not use any multi-dose medications prior to admission.    The information provided in this note is only as accurate as the sources available at the time of the update(s).    Thank you for the opportunity to participate in the care of this patient.    Jesus Ch [facility]  12/6/2021 3:06 PM

## 2021-12-06 NOTE — ED TRIAGE NOTES
Patient is here with altered mental status that started this morning. EMS was informed that she has had this in the past and then snaps out of it after a day. She lives with her daughter. She was soiled in feces upon arrival. She is fighting staff at this time when trying to obtain VS.   
Detail Level: Detailed

## 2021-12-06 NOTE — H&P
[facility] MEDICINE ADMISSION HISTORY AND PHYSICAL     Assessment & Plan       Bita Stephenson is a 71 year old old female with history of HTN, GERD presented with urinary incontinence, altered mental status / acute metabolic encephalopathy, leukocytosis and fever and admitted for probable urinary tract infection / acute cystitis, ?possible viral gastroenteritis. Her daughter and caretaker and Cynthia Jenkins wishes for full code status. PT and OT. Further medical, endocrine, and metabolic w/u as below.     Probable Urinary Tract Infection / Acute Cystitis   ?Viral gastroenteritis   Acute metabolic encephalopathy a/w and/or secondary to UTI/cystitis   Fever  Leukocytosis  -Presented with confusion, urinary incontinence.  -Urinalysis with reflex culture ordered; despite one negative or most unremarkable UA, clinically she has symptoms c/w UTI with urinary incontinence, confusion, dysuria.   -Order IV ceftriaxone and follow-up urine culture speciation/sensitivities to tailor antibiotics.   -Delirium precautions.   -If becomes acutely agitated or develops acute delirium, utilize verbal redirection first, and involve patient's family/contacts if available. Pharmacologic as-needed interventions as second-line, and would only judiciously consider 1:1 sitter and/or restraints if patient becomes a physical danger to self and/or to others/staff or if previous interventions unsuccessful or not effective.   -No signs of severe sepsis given normal lactic acid. No hypotension.     Acute Metabolic/Toxic Encephalopathy, likely a/w and/or secondary to acute/active infection (UTI) as above  Confusion  -Discontinue potential deliriogenic medications/polypharmacy.   -Check thyroid cascade (TSH first) as well as cyanocobalamin and folic acid levels - evaluate/screen for endocrine and metabolic etiologies, respectively.   -Check for infectious etiologies including with CXR and urinalysis (reflux culture if  indicated).   -Delirium precautions.   -Fall precautions.   -PT, OT  -CM/SW cs      HTN  -Order home medications.    GERD  -Order home antacids/medications.       DVTP: Low Risk Patient   Code Status: altered; Her daughter and caretaker and Cyntiha Jenkins wishes for full code status.  Disposition: Inpatient   Expected LOS: 2 days   Goals for the hospitalization: resolution of confusion, treat UTI, complete altered mental status work-up as noted above    Chief Complaint  confusion, urinary incontinence     HISTORY     Bita Stephenson is a 71 year old old female with history of HTN, GERD presented with urinary incontinence, altered mental status / acute metabolic encephalopathy, leukocytosis and fever and admitted for probable urinary tract infection / acute cystitis, ?possible viral gastroenteritis.     Given confusion and acute metabolic encephalopathy, history obtained from patient's daughter and caretaker and Cynthia Jenkins as well as ED MD and EMR reviews.  She was found by daughter today to have urinary incontinence as well as reports of loose stool and unable to care for herself while on the bed.  She was described by her daughter and is out of character and acting unusual compared to her normal baseline.  There were no other associated symptoms; however, the patient's daughter does state that patient's grandkids recently had strep infection and a presumed viral cold.  No one tested positive for Covid.  The patient is also fully vaccinated including booster shot 2 weeks ago.  Upon arrival she does have a fever 200.8  F and leukocytosis.  CT chest imaging did not demonstrate any obvious acute other maladies; CT had also not remarkable.  Blood cultures obtained.  No clear source of infection found via labs/imaging.  Noted the patient did have urinary symptoms including urinary incontinence confusion and dysuria.  There were no reports or endorsement for any recent travel domestically or internationally. There were also no  "reports of fevers, rigors, chest pain or shortness of breath, nausea or vomiting or abdominal pain, focal weakness, or any other new and associated symptoms at this time.  The patient has been compliant with home medications as prescribed. 14 point review of systems performed with the patient without any other pertinent positives at this time.     The social history, family history, and past medical history were directly reviewed with the patient's daughter/Alice Marie, and corroborated to be accurate as documented below. All questions the patient had at this time were answered to verbalized and stated satisfaction and understanding. Code status was discussed with her daughter and caretaker and Cynthia Alice who wishes for full code status.      Past Medical History     No past medical history on file.     Surgical History     Past Surgical History:   Procedure Laterality Date     NO PAST SURGERIES       Family History    Reviewed, and   Family History   Problem Relation Age of Onset     Sudden Death Mother 62.00         at home, no autopsy     Heart Disease Mother         she had an unspecified type of heart surgery one month before her death     No Known Problems Son      No Known Problems Son      Diabetes Type 2  Son      No Known Problems Daughter      No Known Problems Daughter       Social History      Social History     Tobacco Use     Smoking status: Former Smoker     Packs/day: 1.50     Years: 28.00     Pack years: 42.00     Types: Cigarettes, Cigarettes     Quit date: 2001     Years since quittin.8     Smokeless tobacco: Never Used   Substance Use Topics     Alcohol use: No     Comment: Alcoholic Drinks/day: quit drinking in 2017     Drug use: Yes     Types: Marijuana     Comment: Drug use: \"occasional\"      As above, she does use marijuana.     Allergies     Allergies   Allergen Reactions     Paroxetine Itching     tingling     Sulfa Drugs      Other reaction(s): *Unknown     Valium [Diazepam] " Itching     Prior to Admission Medications      Prior to Admission Medications   Prescriptions Last Dose Informant Patient Reported? Taking?   amLODIPine (NORVASC) 10 MG tablet Unknown at Unknown time  Yes Yes   Sig: Take 10 mg by mouth daily    famotidine (PEPCID) 20 MG tablet Unknown at Unknown time  Yes Yes   Sig: Take 20 mg by mouth 2 times daily as needed   melatonin 3 MG tablet Unknown at Unknown time  Yes Yes   Sig: Take 3 mg by mouth nightly as needed for sleep      Facility-Administered Medications: None      Review of Systems     A 12 point comprehensive review of systems was negative except as noted above in HPI.    PHYSICAL EXAMINATION       Vitals      Temp:  [100.8  F (38.2  C)] 100.8  F (38.2  C)  Pulse:  [115] 115  Resp:  [16] 16  BP: (139)/(71) 139/71  SpO2:  [98 %] 98 %    Examination     GENERAL:  Alert, appears comfortable, in no acute distress, appears stated age   HEAD:  Normocephalic, without obvious abnormality, atraumatic   EYES:  PERRL, conjunctiva/corneas clear, no scleral icterus, EOM's intact   NOSE: Nares normal, septum midline, mucosa normal, no drainage   THROAT: Lips, mucosa, and tongue normal; teeth and gums normal, mouth moist   NECK: Supple, symmetrical, trachea midline   BACK:   Symmetric, no curvature, ROM normal   LUNGS:   Clear to auscultation bilaterally, no rales, rhonchi, or wheezing, symmetric chest rise on inhalation, respirations unlabored   CHEST WALL:  No tenderness or deformity   HEART:  Regular rate and rhythm, S1 and S2 normal, no murmur, rub, or gallop    ABDOMEN:   Soft, non-tender, bowel sounds active all four quadrants, no masses, no organomegaly, no rebound or guarding   EXTREMITIES: Extremities normal, atraumatic, no cyanosis or edema    SKIN: Dry to touch, no exanthems in the visualized areas   NEURO: Alert, not at baseline orientation confirmed by her daughter/Cynthia Jenkins, not oriented, moves all four extremities freely, non-focal   PSYCH: Cooperative,  behavior is appropriate      Pertinent Lab     Most Recent 3 CBC's:Recent Labs   Lab Test 12/06/21  1352 03/13/19 2002 01/30/18  1620   WBC 17.5* 9.2 8.2   HGB 11.5* 11.7* 13.1   MCV 84 85 83    295 317     Most Recent 3 BMP's:Recent Labs   Lab Test 12/06/21  1352 03/13/19 2002 01/30/18  1620    138 141   POTASSIUM 4.4 4.1 4.0   CHLORIDE 105 108* 110*   CO2 21* 22 21*   BUN 24 36* 24*   CR 1.52* 1.61* 1.20*   ANIONGAP 12 8 10   TROY 9.1 9.0 9.5   * 103 107     Most Recent 2 LFT's:Recent Labs   Lab Test 12/06/21  1352 03/13/19 2002   AST 20 14   ALT 10 <9   ALKPHOS 62 68   BILITOTAL 0.3 0.2         Pertinent Radiology     Radiology Results:   Recent Results (from the past 24 hour(s))   Head CT w/o contrast    Narrative    EXAM: CT HEAD W/O CONTRAST  LOCATION: [facility]  DATE/TIME: 12/6/2021 3:16 PM    INDICATION: Confusion. Altered mental status.  COMPARISON: None.  TECHNIQUE: Routine CT Head without IV contrast. Multiplanar reformats. Dose reduction techniques were used.    FINDINGS:  INTRACRANIAL CONTENTS: No intracranial hemorrhage, extraaxial collection, or mass effect.  Chronic lacunar infarct in the left thalamus. Mild presumed chronic small vessel ischemic changes. Mild generalized volume loss. No hydrocephalus.     VISUALIZED ORBITS/SINUSES/MASTOIDS: No intraorbital abnormality. No paranasal sinus mucosal disease. No middle ear or mastoid effusion.    BONES/SOFT TISSUES: No acute abnormality.      Impression    IMPRESSION:  1.  No CT evidence for acute intracranial process.  2.  Brain atrophy and presumed chronic microvascular ischemic changes as above.   CT Chest Abdomen Pelvis w/o Contrast    Narrative    EXAM: CT CHEST ABDOMEN PELVIS W/O CONTRAST  LOCATION: [facility]  DATE/TIME: 12/6/2021 3:16 PM    INDICATION: Cough and confusion.  COMPARISON: None.  TECHNIQUE: CT scan of the chest, abdomen, and pelvis was performed without IV  contrast. Multiplanar reformats were obtained. Dose reduction techniques were used.   CONTRAST: None.    FINDINGS:   LUNGS AND PLEURA: No focal consolidation, pneumothorax or pleural effusion. There is mild linear atelectasis or scarring in the inferior lingula. No no suspicious nodules or masses. Mild paraseptal emphysema in the upper lobes. Small calcified nodule in   the left lower lobe on image 105 of series 4 likely represents a granuloma.    MEDIASTINUM/AXILLAE: No lymphadenopathy. Normal heart size. No pericardial effusion. No thoracic aortic aneurysm.    CORONARY ARTERY CALCIFICATION: Mild.    HEPATOBILIARY: Probable small cyst in the left lobe of the liver measuring 6 mm normal calcified gallstones or biliary dilatation.    PANCREAS: Normal.    SPLEEN: Normal.    ADRENAL GLANDS: Normal.    KIDNEYS/BLADDER: Probable small vascular calcification in the right kidney on image 144 of series 3 no hydronephrosis or ureteral stones no abnormal renal masses. No bladder stone or mass.    BOWEL: No bowel obstruction or free intraperitoneal air. Sigmoid diverticulosis without evidence for active diverticulitis or abscess. No evidence for appendicitis.    LYMPH NODES: Normal.    VASCULATURE: Moderate aortoiliac calcification without aneurysm.    PELVIC ORGANS: Normal.    MUSCULOSKELETAL: Mild degenerative change in the spine with disc space narrowing at L5-S1 no fracture or suspicious bony lesion.      Impression    IMPRESSION:  1.  The lungs are clear of focal consolidation. There is no pneumothorax, pleural effusion or thoracic aortic aneurysm. Mild paraseptal emphysema.  2.  No bowel obstruction, free intraperitoneal air, appendicitis diverticulitis or abscess. There is sigmoid diverticulosis.  3.  No ovarian cyst or radiopaque gallstone or ureteral stone. No abdominal aortic aneurysm.  4.  Mild degenerative change lower lumbar spine.     EKG Results: personally reviewed.     Advance Care Planning        Roscoe LORA  MD Michelle  [facility] Medicine  [facility]   Phone: #[phone]

## 2021-12-07 ENCOUNTER — APPOINTMENT (OUTPATIENT)
Dept: PHYSICAL THERAPY | Facility: CLINIC | Age: 71
DRG: 689 | End: 2021-12-07
Attending: HOSPITALIST
Payer: COMMERCIAL

## 2021-12-07 ENCOUNTER — APPOINTMENT (OUTPATIENT)
Dept: OCCUPATIONAL THERAPY | Facility: CLINIC | Age: 71
DRG: 689 | End: 2021-12-07
Attending: HOSPITALIST
Payer: COMMERCIAL

## 2021-12-07 LAB
ANION GAP SERPL CALCULATED.3IONS-SCNC: 10 MMOL/L (ref 5–18)
BUN SERPL-MCNC: 22 MG/DL (ref 8–28)
CALCIUM SERPL-MCNC: 8.7 MG/DL (ref 8.5–10.5)
CHLORIDE BLD-SCNC: 111 MMOL/L (ref 98–107)
CO2 SERPL-SCNC: 20 MMOL/L (ref 22–31)
CREAT SERPL-MCNC: 1.26 MG/DL (ref 0.6–1.1)
ERYTHROCYTE [DISTWIDTH] IN BLOOD BY AUTOMATED COUNT: 14.2 % (ref 10–15)
FOLATE SERPL-MCNC: 14.5 NG/ML
GFR SERPL CREATININE-BSD FRML MDRD: 42 ML/MIN/1.73M2
GLUCOSE BLD-MCNC: 99 MG/DL (ref 70–125)
HCT VFR BLD AUTO: 33.5 % (ref 35–47)
HGB BLD-MCNC: 10.8 G/DL (ref 11.7–15.7)
MCH RBC QN AUTO: 26.9 PG (ref 26.5–33)
MCHC RBC AUTO-ENTMCNC: 32.2 G/DL (ref 31.5–36.5)
MCV RBC AUTO: 83 FL (ref 78–100)
PLATELET # BLD AUTO: 271 10E3/UL (ref 150–450)
POTASSIUM BLD-SCNC: 4 MMOL/L (ref 3.5–5)
RBC # BLD AUTO: 4.02 10E6/UL (ref 3.8–5.2)
SODIUM SERPL-SCNC: 141 MMOL/L (ref 136–145)
WBC # BLD AUTO: 12.2 10E3/UL (ref 4–11)

## 2021-12-07 PROCEDURE — 99232 SBSQ HOSP IP/OBS MODERATE 35: CPT | Performed by: HOSPITALIST

## 2021-12-07 PROCEDURE — 97161 PT EVAL LOW COMPLEX 20 MIN: CPT | Mod: GP

## 2021-12-07 PROCEDURE — 80048 BASIC METABOLIC PNL TOTAL CA: CPT | Performed by: HOSPITALIST

## 2021-12-07 PROCEDURE — 120N000001 HC R&B MED SURG/OB

## 2021-12-07 PROCEDURE — 97129 THER IVNTJ 1ST 15 MIN: CPT | Mod: GO

## 2021-12-07 PROCEDURE — 97535 SELF CARE MNGMENT TRAINING: CPT | Mod: GO

## 2021-12-07 PROCEDURE — 99207 PR APP CREDIT; MD BILLING SHARED VISIT: CPT | Performed by: HOSPITALIST

## 2021-12-07 PROCEDURE — 97166 OT EVAL MOD COMPLEX 45 MIN: CPT | Mod: GO

## 2021-12-07 PROCEDURE — 85014 HEMATOCRIT: CPT | Performed by: HOSPITALIST

## 2021-12-07 PROCEDURE — 250N000013 HC RX MED GY IP 250 OP 250 PS 637: Performed by: HOSPITALIST

## 2021-12-07 PROCEDURE — 36415 COLL VENOUS BLD VENIPUNCTURE: CPT | Performed by: HOSPITALIST

## 2021-12-07 PROCEDURE — 258N000003 HC RX IP 258 OP 636: Performed by: HOSPITALIST

## 2021-12-07 PROCEDURE — 250N000011 HC RX IP 250 OP 636: Performed by: HOSPITALIST

## 2021-12-07 RX ADMIN — SODIUM CHLORIDE: 9 INJECTION, SOLUTION INTRAVENOUS at 19:51

## 2021-12-07 RX ADMIN — CEFTRIAXONE 1 G: 1 INJECTION, POWDER, FOR SOLUTION INTRAMUSCULAR; INTRAVENOUS at 19:17

## 2021-12-07 RX ADMIN — ACETAMINOPHEN 650 MG: 325 TABLET ORAL at 23:25

## 2021-12-07 RX ADMIN — FAMOTIDINE 20 MG: 20 TABLET ORAL at 07:40

## 2021-12-07 RX ADMIN — AMLODIPINE BESYLATE 10 MG: 10 TABLET ORAL at 07:40

## 2021-12-07 RX ADMIN — SODIUM CHLORIDE 100 ML/HR: 9 INJECTION, SOLUTION INTRAVENOUS at 08:44

## 2021-12-07 ASSESSMENT — ACTIVITIES OF DAILY LIVING (ADL)
ADLS_ACUITY_SCORE: 5
DIFFICULTY_EATING/SWALLOWING: NO
CONCENTRATING,_REMEMBERING_OR_MAKING_DECISIONS_DIFFICULTY: NO
ADLS_ACUITY_SCORE: 5
DOING_ERRANDS_INDEPENDENTLY_DIFFICULTY: NO
FALL_HISTORY_WITHIN_LAST_SIX_MONTHS: NO
WALKING_OR_CLIMBING_STAIRS_DIFFICULTY: NO
ADLS_ACUITY_SCORE: 6
ADLS_ACUITY_SCORE: 5
ADLS_ACUITY_SCORE: 6
ADLS_ACUITY_SCORE: 6
ADLS_ACUITY_SCORE: 5
ADLS_ACUITY_SCORE: 5
DIFFICULTY_COMMUNICATING: NO
ADLS_ACUITY_SCORE: 5
DRESSING/BATHING_DIFFICULTY: NO
HEARING_DIFFICULTY_OR_DEAF: NO
ADLS_ACUITY_SCORE: 5
WEAR_GLASSES_OR_BLIND: YES
ADLS_ACUITY_SCORE: 4
ADLS_ACUITY_SCORE: 5
ADLS_ACUITY_SCORE: 4
TOILETING_ISSUES: NO
ADLS_ACUITY_SCORE: 4
ADLS_ACUITY_SCORE: 4
ADLS_ACUITY_SCORE: 5
ADLS_ACUITY_SCORE: 4
ADLS_ACUITY_SCORE: 5
ADLS_ACUITY_SCORE: 5
ADLS_ACUITY_SCORE: 4

## 2021-12-07 NOTE — PLAN OF CARE
"  Problem: Risk for Delirium  Goal: Optimal Coping  Outcome: Improving  Goal: Improved Behavioral Control  Outcome: Improving  Goal: Improved Attention and Thought Clarity  Outcome: Improving  Goal: Improved Sleep  Outcome: Improving     Problem: Hypertension Acute  Goal: Blood Pressure Within Desired Range  Outcome: Improving   No complaints of pain.  Pt mentation has cleared since coming to the hospital.  She just doesn't know why she is here.  She worked with PT/OT and has been cleared by them.  She is a standby assist.  She hasn't had an appetite today and she said this is not like her.  She says she \"usually lives for food.\"  Pt tele has been NSR.  Will continue to monitor.  "

## 2021-12-07 NOTE — PROGRESS NOTES
[facility]    Medicine Progress Note - Hospitalist Service       Date of Admission:  12/6/2021     Bita Stephenson is a 71 year old old female with history of HTN, GERD presented with urinary incontinence, altered mental status / acute metabolic encephalopathy, leukocytosis and fever and admitted for UTI/cystitis. Her daughter and caretaker and Cynthia Jenkins wishes for full code status. PT and OT. Further medical, endocrine, and metabolic w/u as below.       Assessment & Plan      Urinary Tract Infection / Acute Cystitis   Acute metabolic encephalopathy a/w and/or secondary to UTI/cystitis, resolved   Fever, resolved  Leukocytosis  -Presented with confusion, urinary incontinence.  -Urinalysis with reflex culture ordered; despite one negative or most unremarkable UA, clinically she has symptoms c/w UTI with urinary incontinence, confusion, dysuria.    -Delirium precautions.   -If becomes acutely agitated or develops acute delirium, utilize verbal redirection first, and involve patient's family/contacts if available. Pharmacologic as-needed interventions as second-line, and would only judiciously consider 1:1 sitter and/or restraints if patient becomes a physical danger to self and/or to others/staff or if previous interventions unsuccessful or not effective.   -IV Ceftrixone, f/u urine culture speciation/sensitivities to tailor abx  -No signs of severe sepsis given normal lactic acid. No hypotension.    ANNIKA  -Hold potentially nephrogenic or nephrotoxic medications.   -Administer IVF bolus now and initiate maintenance IVF.  -Recheck BMP and Cr tomorrow AM.   -Continue IVF 100ml/hr until baseline Cr/eGFR  -Cr. 1.5 at admission  -Cr 1.2 today     Acute Metabolic/Toxic Encephalopathy, likely a/w and/or secondary to acute/active infection (UTI) as above  Confusion  -Discontinue potential deliriogenic medications/polypharmacy.   -Delirium precautions.   -Fall precautions.   -PT, OT  -CM/SW cs  -Check  thyroid cascade (TSH first) as well as cyanocobalamin and folic acid levels - evaluate/screen for endocrine and metabolic etiologies, respectively.   -Check for infectious etiologies including with CXR and urinalysis (reflux culture if indicated).   -TSH w/ free T4: 1.35  -Folate pending  -B12 is low end of normal  -Chest/Abd/Pelvis CT unremarkable  -Head CT showed brain atrophy and presumed chronic mircovascular ischemic changes. No evidence of acute intracranial process  -Marked improvement, from A&Ox0 to x2, and able to converse appropriately and pleasantly.      HTN  -Order home medication, amlodipine     GERD  -Order home antacids/medications.         Diet: Combination Diet Regular Diet Adult    DVT Prophylaxis: low risk patient  Swan Catheter: Not present  Central Lines: None  Code Status: Full Code      Disposition Plan   Expected discharge: 1 day recommended to prior living arrangement once  resolution of confusion, urine culture s/s, PT/OT evals, and treat UTI.     The patient's care was discussed with the the Attending physician, Dr. Michelle Shirley, PA-S2  Hospitalist Service  [facility]  Securely message with the Vocera Web Console (learn more here)  Text page via [employer] Paging/Directory    I agree with the documentation and findings as written by Tammie Shirley and our discussed and formulated assessment and plan. I was present with Tammie who participated in the service and documentation of the note. I have also personally verified the history and personally performed the physical exam and medical decision-making. I agree with the assessment and plan of care as documented in the note.    Roscoe Martinez MD  Internal Medicine  Hospitalist  [facility]  Phone: #[phone]      ______________________________________________________________________    Interval History   Patient was sleeping in bed and alert when awoken. She notes she is  feeling a lot better today, AOx2. She states she has not had any incontinence since yesterday that she remembers and is unsure of burning or any further retention. She knows to call the nurse for help when she needs the restroom due to continued fall precautions. She denies headache, lightheadedness, chest pain, SOB, N/V, abdominal pain or numbness and tingeling.     Data reviewed today: I reviewed all medications, new labs and imaging results over the last 24 hours.     Physical Exam   Vital Signs: Temp: 98.3  F (36.8  C) Temp src: Oral BP: (!) 157/81 Pulse: 91   Resp: 23 SpO2: 100 % O2 Device: None (Room air)    Weight: 123 lbs 3.79 oz  General Appearance: Asleep in bed, alert once awoken. Comfortable, in no acute distress.  Respiratory: clear to ascultation bilaterally, no rales, rhonchi or wheezing. symmetric chest rise. unlabored respirations.   Cardiovascular: Slightly tachycardic rate and rhythm. Normal S1, S2. No murmurs, rubs or gallop.   GI: Soft, non-tender. No masses, organomegaly. No rebound or guarding.   Neuro: AOx2. Limbs moving spontaneously and freely. No focal deficits noted at this time.   Psych: Cooperative, behavior is appropriate.     Data   Recent Labs   Lab 12/07/21  0555 12/07/21  0554 12/06/21  1352   WBC 12.2*  --  17.5*   HGB 10.8*  --  11.5*   MCV 83  --  84     --  313   NA  --  141 138   POTASSIUM  --  4.0 4.4   CHLORIDE  --  111* 105   CO2  --  20* 21*   BUN  --  22 24   CR  --  1.26* 1.52*   ANIONGAP  --  10 12   TROY  --  8.7 9.1   GLC  --  99 126*   ALBUMIN  --   --  3.8   PROTTOTAL  --   --  7.5   BILITOTAL  --   --  0.3   ALKPHOS  --   --  62   ALT  --   --  10   AST  --   --  20   LIPASE  --   --  29     Recent Results (from the past 24 hour(s))   Head CT w/o contrast    Narrative    EXAM: CT HEAD W/O CONTRAST  LOCATION: [facility]  DATE/TIME: 12/6/2021 3:16 PM    INDICATION: Confusion. Altered mental status.  COMPARISON: None.  TECHNIQUE:  Routine CT Head without IV contrast. Multiplanar reformats. Dose reduction techniques were used.    FINDINGS:  INTRACRANIAL CONTENTS: No intracranial hemorrhage, extraaxial collection, or mass effect.  Chronic lacunar infarct in the left thalamus. Mild presumed chronic small vessel ischemic changes. Mild generalized volume loss. No hydrocephalus.     VISUALIZED ORBITS/SINUSES/MASTOIDS: No intraorbital abnormality. No paranasal sinus mucosal disease. No middle ear or mastoid effusion.    BONES/SOFT TISSUES: No acute abnormality.      Impression    IMPRESSION:  1.  No CT evidence for acute intracranial process.  2.  Brain atrophy and presumed chronic microvascular ischemic changes as above.   CT Chest Abdomen Pelvis w/o Contrast    Narrative    EXAM: CT CHEST ABDOMEN PELVIS W/O CONTRAST  LOCATION: [facility]  DATE/TIME: 12/6/2021 3:16 PM    INDICATION: Cough and confusion.  COMPARISON: None.  TECHNIQUE: CT scan of the chest, abdomen, and pelvis was performed without IV contrast. Multiplanar reformats were obtained. Dose reduction techniques were used.   CONTRAST: None.    FINDINGS:   LUNGS AND PLEURA: No focal consolidation, pneumothorax or pleural effusion. There is mild linear atelectasis or scarring in the inferior lingula. No no suspicious nodules or masses. Mild paraseptal emphysema in the upper lobes. Small calcified nodule in   the left lower lobe on image 105 of series 4 likely represents a granuloma.    MEDIASTINUM/AXILLAE: No lymphadenopathy. Normal heart size. No pericardial effusion. No thoracic aortic aneurysm.    CORONARY ARTERY CALCIFICATION: Mild.    HEPATOBILIARY: Probable small cyst in the left lobe of the liver measuring 6 mm normal calcified gallstones or biliary dilatation.    PANCREAS: Normal.    SPLEEN: Normal.    ADRENAL GLANDS: Normal.    KIDNEYS/BLADDER: Probable small vascular calcification in the right kidney on image 144 of series 3 no hydronephrosis or ureteral  stones no abnormal renal masses. No bladder stone or mass.    BOWEL: No bowel obstruction or free intraperitoneal air. Sigmoid diverticulosis without evidence for active diverticulitis or abscess. No evidence for appendicitis.    LYMPH NODES: Normal.    VASCULATURE: Moderate aortoiliac calcification without aneurysm.    PELVIC ORGANS: Normal.    MUSCULOSKELETAL: Mild degenerative change in the spine with disc space narrowing at L5-S1 no fracture or suspicious bony lesion.      Impression    IMPRESSION:  1.  The lungs are clear of focal consolidation. There is no pneumothorax, pleural effusion or thoracic aortic aneurysm. Mild paraseptal emphysema.  2.  No bowel obstruction, free intraperitoneal air, appendicitis diverticulitis or abscess. There is sigmoid diverticulosis.  3.  No ovarian cyst or radiopaque gallstone or ureteral stone. No abdominal aortic aneurysm.  4.  Mild degenerative change lower lumbar spine.     Medications     sodium chloride 100 mL/hr (12/07/21 0844)       amLODIPine  10 mg Oral Daily     cefTRIAXone  1 g Intravenous Q24H     famotidine  20 mg Oral Daily     sodium chloride (PF)  3 mL Intracatheter Q8H

## 2021-12-07 NOTE — PROGRESS NOTES
VSS, Afebrile. Around 0100 woke up confused needing to use the bathroom. Answers yes and no question.     Woke up around 0600 alert, pleasant and was able to say where she is. Responds to questions and commands appropriately.  NSR to Britney( 105s) on Tele    Ax1 to  the bathroom.

## 2021-12-07 NOTE — PROGRESS NOTES
12/07/21 1300   Quick Adds   Type of Visit Initial Occupational Therapy Evaluation   Living Environment   People in home child(meli), adult  (Grd children)   Current Living Arrangements other (see comments)  [place]   Living Environment Comments tub/shower with GB, std toilet with vanity on  R   Self-Care   Usual Activity Tolerance good   Current Activity Tolerance good   Activity/Exercise/Self-Care Comment Prior to admission was indep with ADLs at home.   Disability/Function   Hearing Difficulty or Deaf no   Wear Glasses or Blind yes   Vision Management glasses - not in the hospital   General Information   Onset of Illness/Injury or Date of Surgery 12/06/21   Referring Physician Dr. Roscoe Martinez   Patient/Family Therapy Goal Statement (OT) To return home.   Cognitive Status Examination   Orientation Status person;place;time   Affect/Mental Status (Cognitive) WNL   Follows Commands over 90% accuracy   Memory Deficit moderate deficit   Attention Deficit minimal deficit   Cognitive Status Comments See SLUMS score   Bed Mobility   Bed Mobility rolling left;supine-sit   Rolling Left [place] (Bed Mobility) independent   Supine-Sit [place] (Bed Mobility) independent   Transfers   Transfers bed-chair transfer;sit-stand transfer;toilet transfer   Transfer Skill: Bed to Chair/Chair to Bed   Bed-Chair [place] (Transfers) independent   Assistive Device (Bed-Chair Transfers) other (see comments)  (None)   Sit-Stand Transfer   Sit-Stand [place] (Transfers) independent   Assistive Device (Sit-Stand Transfers) other (see comments)  (None)   Toilet Transfer   Type (Toilet Transfer) sit-stand;stand-sit   [place] Level (Toilet Transfer) independent   Assistive Device (Toilet Transfer) other (see comments)  (None)   Balance   Balance Assessment no deficits were identified   Activities of Daily Living   BADL Assessment lower body dressing;grooming;toileting   Lower Body Dressing  Assessment   [place] Level (Lower Body Dressing) independent   Position (Lower Body Dressing) supported sitting   Comment (Lower Body Dressing) Donned and doffed her hospital socks while sitting in chair.  No SOB or fatigue   Grooming Assessment   [place] Level (Grooming) independent   Position (Grooming) sink side;unsupported standing   Toileting   [place] Level (Toileting) independent   Position (Toileting) unsupported sitting;unsupported standing   Clinical Impression   Criteria for Skilled Therapeutic Interventions Met (OT) yes   OT Diagnosis decreased cognition   OT Problem List-Impairments impacting ADL cognition   Assessment of Occupational Performance 1-3 Performance Deficits   Identified Performance Deficits Cognition   Planned Therapy Interventions (OT) cognition   Clinical Decision Making Complexity (OT) moderate complexity   Therapy Frequency (OT) Daily   Predicted Duration of Therapy 1 day   Risk & Benefits of therapy have been explained patient   OT Discharge Planning    OT Discharge Recommendation (DC Rec) Home with assist;home with outpatient occupational therapy  (For further cognitive testing)   OT Rationale for DC Rec Pt will have assist from her Dtr whom she lives with.   Total Evaluation Time (Minutes)   Total Evaluation Time (Minutes) 8

## 2021-12-07 NOTE — UTILIZATION REVIEW
Admission Status; Secondary Review Determination   Under the authority of the Utilization Management Committee, the utilization review process indicated a secondary review on Bita Stephenson. The review outcome is based on review of the medical records, discussions with staff, and applying clinical experience noted on the date of the review.   (x) Inpatient Status Appropriate - This patient's medical care is consistent with medical management for inpatient care and reasonable inpatient medical practice.     RATIONALE FOR DETERMINATION   71 yr old female with HTN, GERD presented to ER with acute confusion and urinary incontinence with fever and leukocytosis.  UTI with possible viral gastroenteritis.  IV abx. Cultures in progress.  This AM less confusion however ongoing tachycardia.    WBC some improved but still elevated.  Blood and urine cultures continue in progress.  CRP is up a little from presentation.    At the time of admission with the information available to the attending physician more than 2 nights Hospital complex care was anticipated, based on patient risk of adverse outcome if treated as outpatient and complex care required. Inpatient admission is appropriate based on the Medicare guidelines.   The information on this document is developed by the utilization review team in order for the business office to ensure compliance. This only denotes the appropriateness of proper admission status and does not reflect the quality of care rendered.   The definitions of Inpatient Status and Observation Status used in making the determination above are those provided in the CMS Coverage Manual, Chapter 1 and Chapter 6, section 70.4.   Sincerely,   Belkis Mariscal MD  Utilization Review  Physician Advisor  [facility]

## 2021-12-07 NOTE — PROGRESS NOTES
12/07/21 1057   Quick Adds   Type of Visit Initial PT Evaluation   Living Environment   People in home child(meli), adult   Current Living Arrangements house   Home Accessibility stairs to enter home   Number of Stairs, Main Entrance 1   Living Environment Comments can stay on main level   Self-Care   Usual Activity Tolerance good   Activity/Exercise/Self-Care Comment states she I with all ADL's does not use assistive device with ambulation   General Information   Onset of Illness/Injury or Date of Surgery 12/06/21   Patient/Family Therapy Goals Statement (PT) to return to home   Pertinent History of Current Problem (include personal factors and/or comorbidities that impact the POC) brought in for confusion , weakenss   Existing Precautions/Restrictions no known precautions/restrictions   Cognition   Orientation Status (Cognition) oriented x 4   Affect/Mental Status (Cognition) WFL   Follows Commands (Cognition) WFL   Cognitive Status Comments patient very appropriate in all conversation , mood, affect   Posture    Posture Not impaired   Range of Motion (ROM)   ROM Quick Adds ROM WFL   Strength   Manual Muscle Testing Quick Adds No deficits observed during functional mobility   Transfers   Transfers No deficits identified   Gait/Stairs (Locomotion)   [place] Level (Gait) independent   Distance in Feet (Required for LE Total Joints) 200, 10, 10   Pattern (Gait) step-through   Balance   Balance no deficits were identified   Sensory Examination   Sensory Perception patient reports no sensory changes   Coordination   Coordination no deficits were identified   Muscle Tone   Muscle Tone no deficits were identified   Clinical Impression   Criteria for Skilled Therapeutic Intervention evaluation only   Clinical Decision Making (Complexity) low complexity   Therapy Frequency (PT) Evaluation only   PT Discharge Planning    PT Discharge Recommendation (DC Rec) home with assist   PT Rationale for DC Rec Patient  functionallly  at baseline for mobility   PT Brief overview of current status  No skilled PT needs   Total Evaluation Time   Total Evaluation Time (Minutes) 15

## 2021-12-07 NOTE — PROGRESS NOTES
12/07/21 1300   Cognitive Assessments   Cognitive Assessments Completed [facility] Mental Status Exam (UMS):  Total Score out of /30 15/30   Norms 1-20 equals dementia   Domains assessed: orientation, memory, attention, executive functions       Pt has her masters degree in social work.  Today's SLUMS's score was 15/30 indicating cognitive impairment.  Pt did not have her glasses on during this test as her glasses were left at home per Pt report.  Pt had difficulty with short term memory.  Recommend further cognitive testing as an outpatient once medically stable.  Terry Peace, OTR/L  12/7/2021

## 2021-12-07 NOTE — CONSULTS
Care Management Initial Consult    General Information  Assessment completed with: Children, Alice Paris  Type of CM/SW Visit: Initial Assessment    Primary Care Provider verified and updated as needed: Yes   Readmission within the last 30 days: no previous admission in last 30 days      Reason for Consult: discharge planning  Advance Care Planning: Advance Care Planning Reviewed: concerns discussed (Daughter informed how to proceed with incomplete directives.)        Communication Assessment  Patient's communication style: spoken language (English or Bilingual)    Hearing Difficulty or Deaf: no   Wear Glasses or Blind: no    Cognitive  Cognitive/Neuro/Behavioral:                        Living Environment:   People in home: child(meli), adult,grandchild(meli) (Daughter and her three kids.)     Current living Arrangements: house      Able to return to prior arrangements: yes     Family/Social Support:  Care provided by: self (Independent at baseline.)  Provides care for: grandchild(meli) (As needed/able.)  Marital Status:   Children (Daughter Alice.)          Description of Support System: Supportive,Involved    Support Assessment: Adequate family and caregiver support,Adequate social supports    Current Resources:   Patient receiving home care services: No     Community Resources: None  Equipment currently used at home: grab bar, tub/shower  Supplies currently used at home: None    Employment/Financial:  Employment Status:  Undetermined      Financial Concerns: No concerns identified   Referral to Financial Counselor: No     Lifestyle & Psychosocial Needs:  Social Determinants of Health     Tobacco Use: Medium Risk     Smoking Tobacco Use: Former Smoker     Smokeless Tobacco Use: Never Used   Alcohol Use: Not on file   Financial Resource Strain: Not on file   Food Insecurity: Not on file   Transportation Needs: Not on file   Physical Activity: Not on file   Stress: Not on file   Social Connections: Not on  "file   Intimate Partner Violence: Not on file   Depression: Not on file   Housing Stability: Not on file     Functional Status:  Prior to admission patient needed assistance:   Dependent ADLs:: Independent  Dependent IADLs:: Independent  Assesssment of Functional Status: Not at  functional baseline    Mental Health Status:  Mental Health Status: No Current Concerns       Chemical Dependency Status:  Chemical Dependency Status: No Current Concerns           Values/Beliefs:  Spiritual, Cultural Beliefs, Yazidism Practices, Values that affect care: no             Additional Information:  Writer spoke with pt's daughter Alice Paris(AB) by phone due to pt's cognition presently. At baseline, pt is reportedly independent with I/ADLs, uses no DME regularly, and has no need for any in-home services. AB denies the need for any CM assistance if pt returns to near baseline or better.    See ED note snippet on 12/06 by Nikunj Boothe -\"71yoF with history of HTN, CKD3, s/p COVID-19 vaccine x2 presenting per EMS from home (lives with daughter) for evaluation of confusion and cough. Daughter reports patient did not get up for breakfast as usual this morning (went to bed last night feeling fine with no symptoms yesterday); then found in bed incontinence of stool & urine with a dry cough. Patient stated to her that \"she felt sick\" and daughter should call 911. EMS called; hemodynamically stable en route to the ED. Daughter notes patient is not acting her usual self still; now not talking to daughter but does look at her.     Temp 38.2C on presentation with HR 110s, satting well on room air. Makes eye contact on exam and turns over in bed during exam; wants to be left alone. Moves head freely with no meningismus. Has clear lungs with normal work of breathing, benign abdomen with no tenderness, no peripheral edema or calf tenderness, no rash. Concern for infectious process high; COVID-19 high given cough and fever. Mild " "encephalopathy but moves head freely with no meningismus; low concern for meningitis. Will obtain CT scans given AMS along with COVID-19 swab, blood, urine. Will give Tylenol for fever; suspect this driving mild tachycardia. Will defer initial IVF with no clinical dehydration on exam and high concern for COVID-19. Daughter comfortable with this plan; no further questions at this time.\"    PT/OT consults have been ordered. Anticipate return home via daughter without issue or in-home services. CM to continue to follow and address discharge concerns as needed/able.    Gutierrez Bravo RN        "

## 2021-12-07 NOTE — PLAN OF CARE
Occupational Therapy Discharge Summary    Reason for therapy discharge:    All goals and outcomes met, no further needs identified.    Progress towards therapy goal(s). See goals on Care Plan in [facility] electronic health record for goal details.  Goals met    Therapy recommendation(s):    Continued therapy is recommended.  Rationale/Recommendations:  Outpatient OT for further cognitive testing.  Pt's SLUMS score while hospitalized was 15/30 - indicating cognitive impairment.  Recommend further testing as an outpatient.

## 2021-12-08 VITALS
RESPIRATION RATE: 16 BRPM | HEART RATE: 89 BPM | HEIGHT: 63 IN | OXYGEN SATURATION: 100 % | TEMPERATURE: 97.8 F | WEIGHT: 120 LBS | DIASTOLIC BLOOD PRESSURE: 83 MMHG | SYSTOLIC BLOOD PRESSURE: 143 MMHG | BODY MASS INDEX: 21.26 KG/M2

## 2021-12-08 LAB
ANION GAP SERPL CALCULATED.3IONS-SCNC: 7 MMOL/L (ref 5–18)
ATRIAL RATE - MUSE: 98 BPM
BACTERIA UR CULT: NO GROWTH
BUN SERPL-MCNC: 18 MG/DL (ref 8–28)
CALCIUM SERPL-MCNC: 8.5 MG/DL (ref 8.5–10.5)
CHLORIDE BLD-SCNC: 108 MMOL/L (ref 98–107)
CO2 SERPL-SCNC: 22 MMOL/L (ref 22–31)
CREAT SERPL-MCNC: 1.08 MG/DL (ref 0.6–1.1)
DIASTOLIC BLOOD PRESSURE - MUSE: NORMAL MMHG
GFR SERPL CREATININE-BSD FRML MDRD: 51 ML/MIN/1.73M2
GLUCOSE BLD-MCNC: 194 MG/DL (ref 70–125)
INTERPRETATION ECG - MUSE: NORMAL
P AXIS - MUSE: 63 DEGREES
POTASSIUM BLD-SCNC: 3.6 MMOL/L (ref 3.5–5)
PR INTERVAL - MUSE: 140 MS
QRS DURATION - MUSE: 84 MS
QT - MUSE: 356 MS
QTC - MUSE: 454 MS
R AXIS - MUSE: -23 DEGREES
SODIUM SERPL-SCNC: 137 MMOL/L (ref 136–145)
SYSTOLIC BLOOD PRESSURE - MUSE: NORMAL MMHG
T AXIS - MUSE: 50 DEGREES
VENTRICULAR RATE- MUSE: 98 BPM

## 2021-12-08 PROCEDURE — 99207 PR APP CREDIT; MD BILLING SHARED VISIT: CPT | Performed by: HOSPITALIST

## 2021-12-08 PROCEDURE — 93005 ELECTROCARDIOGRAM TRACING: CPT

## 2021-12-08 PROCEDURE — 99239 HOSP IP/OBS DSCHRG MGMT >30: CPT | Performed by: HOSPITALIST

## 2021-12-08 PROCEDURE — 90662 IIV NO PRSV INCREASED AG IM: CPT | Performed by: HOSPITALIST

## 2021-12-08 PROCEDURE — 93005 ELECTROCARDIOGRAM TRACING: CPT | Performed by: HOSPITALIST

## 2021-12-08 PROCEDURE — 250N000013 HC RX MED GY IP 250 OP 250 PS 637: Performed by: HOSPITALIST

## 2021-12-08 PROCEDURE — 258N000003 HC RX IP 258 OP 636: Performed by: HOSPITALIST

## 2021-12-08 PROCEDURE — G0008 ADMIN INFLUENZA VIRUS VAC: HCPCS | Performed by: HOSPITALIST

## 2021-12-08 PROCEDURE — 82374 ASSAY BLOOD CARBON DIOXIDE: CPT | Performed by: HOSPITALIST

## 2021-12-08 PROCEDURE — 250N000011 HC RX IP 250 OP 636: Performed by: HOSPITALIST

## 2021-12-08 PROCEDURE — 93010 ELECTROCARDIOGRAM REPORT: CPT | Performed by: INTERNAL MEDICINE

## 2021-12-08 PROCEDURE — 36415 COLL VENOUS BLD VENIPUNCTURE: CPT | Performed by: HOSPITALIST

## 2021-12-08 RX ORDER — CEFDINIR 300 MG/1
300 CAPSULE ORAL 2 TIMES DAILY
Qty: 8 CAPSULE | Refills: 0 | Status: SHIPPED | OUTPATIENT
Start: 2021-12-08 | End: 2021-12-12

## 2021-12-08 RX ADMIN — AMLODIPINE BESYLATE 10 MG: 10 TABLET ORAL at 08:48

## 2021-12-08 RX ADMIN — FAMOTIDINE 20 MG: 20 TABLET ORAL at 08:48

## 2021-12-08 RX ADMIN — SODIUM CHLORIDE: 9 INJECTION, SOLUTION INTRAVENOUS at 05:09

## 2021-12-08 RX ADMIN — INFLUENZA A VIRUS A/VICTORIA/2570/2019 IVR-215 (H1N1) ANTIGEN (FORMALDEHYDE INACTIVATED), INFLUENZA A VIRUS A/TASMANIA/503/2020 IVR-221 (H3N2) ANTIGEN (FORMALDEHYDE INACTIVATED), INFLUENZA B VIRUS B/PHUKET/3073/2013 ANTIGEN (FORMALDEHYDE INACTIVATED), AND INFLUENZA B VIRUS B/WASHINGTON/02/2019 ANTIGEN (FORMALDEHYDE INACTIVATED) 0.7 ML: 60; 60; 60; 60 INJECTION, SUSPENSION INTRAMUSCULAR at 11:28

## 2021-12-08 ASSESSMENT — ACTIVITIES OF DAILY LIVING (ADL)
ADLS_ACUITY_SCORE: 4

## 2021-12-08 ASSESSMENT — MIFFLIN-ST. JEOR: SCORE: 1010.51

## 2021-12-08 NOTE — PLAN OF CARE
Problem: Adult Inpatient Plan of Care  Goal: Optimal Comfort and Wellbeing  Outcome: Improving     Problem: Risk for Delirium  Goal: Improved Attention and Thought Clarity  Outcome: Improving    Alert and oriented this shift. Sleeping between cares. Given prn tylenol for headache with relief, otherwise denies pain. IV fluids infusing per orders. Up to bathroom with SBA. Bed alarm on for safety. Call light within reach.

## 2021-12-08 NOTE — PLAN OF CARE
Problem: Risk for Delirium  Goal: Optimal Coping  Outcome: Improving  Goal: Improved Attention and Thought Clarity  Outcome: Improving     Problem: UTI (Urinary Tract Infection)  Goal: Improved Infection Symptoms  Outcome: Improving     Pt disoriented to situation. VSS with exception of elevated pulse. Denies SOB, chest pain, and N/V. Tolerating regular diet, appetite improving. Has NS infusing @ 100 ml/hr. Up and ambulating to and from bathroom with SBA. Alarms on for safety. Will continue plan of care.

## 2021-12-08 NOTE — DISCHARGE SUMMARY
[facility]  Hospitalist Discharge Summary      Date of Admission:  12/6/2021  Date of Discharge:  12/8/2021  Discharging Provider: Roscoe Martinez MD, Tammie SANDOVAL-S2       Discharge Diagnoses   Acute cystitis without hematuria  Acute Metabolic Encephalopathy   ANNIKA    HTN  GERD     Follow-ups Needed After Discharge   Follow up with primary care provider for further cognitive testing.    Unresulted Labs Ordered in the Past 30 Days of this Admission     Date and Time Order Name Status Description    12/6/2021  5:19 PM Urine Culture Preliminary     12/6/2021  1:34 PM Blood Culture Peripheral Blood Preliminary     12/6/2021  1:34 PM Blood Culture Peripheral Blood Preliminary       Patient will be notified if there are any abnormal results that would affect clinical care.    Discharge Disposition   Discharged to home  Condition at discharge: [facility]    Hospital Course  / Summary   Bita Stephenson is a 71 year old old female with history of HTN, GERD presented with urinary incontinence, altered mental status / acute metabolic encephalopathy, leukocytosis and feverand admitted for UTI/acute cystitis and acute kidney injury.     Urinary Tract Infection / Acute Cystitis   Acute metabolic encephalopathy a/w and/or secondary to UTI/cystitis  -Presented with confusion, urinary incontinence.  -Urinalysis with reflex culture ordered; despite one negative or most unremarkable UA, clinically she had symptoms c/w UTI with urinary incontinence, confusion, dysuria.     -IV Ceftrixone x 2 days and then transitioned to oral antibiotics.   -No signs of severe sepsis given normal lactic acid. No hypotension.  -Blood cultures and Urine cultures showed no growth at 24 hours.  -Order Cefdinir po to complete total 5 days     ANNIKA  -IVF 100ml/hr x 2 days  -Cr. 1.52 at admission  -Cr 1.08 today     Acute Metabolic/Toxic Encephalopathy, likely a/w and/or secondary to acute/active infection (UTI) as  above  Confusion  -Medical, endocrine, and toxic w/u otherwise negative and not remarkable.  -Chest/Abd/Pelvis CT unremarkable  -Head CT showed brain atrophy and presumed chronic mircovascular ischemic changes. No evidence of acute intracranial process  -Marked improvement, from A&O x0 to x3, and able to converse appropriately and pleasantly.   -PT/OT consult: SLUMS 15/30, cleared for safe discharge, but follow up outpatient for further cognitive testing.      GERD  - Increased chest tightness during stay that was transient and self-resolved, less than a few minutes; EKG was non-ischemic without any acute changes.   - Continue pepcid as needed for GERD, will discharge with rx for TUMS OTC.   - Can follow up with PCP if continues to worsen or becomes daily occurrence for further pharmaceutical interventions    Consultations This Hospital Stay   PHYSICAL THERAPY ADULT IP CONSULT  OCCUPATIONAL THERAPY ADULT IP CONSULT  SOCIAL WORK IP CONSULT  ADVANCE DIRECTIVE IP CONSULT    Code Status   Full Code    Time Spent on this Encounter   ITammie, with Dr. Martinez, personally saw the patient today and spent greater than 30 minutes discharging this patient.       LORI Anthony-  Hospitalist Service   [address] [address]  Phone: [phone]  Fax: [phone]     I, Roscoe Martinez MD, personally saw the patient today and spent greater than 30 minutes discharging this patient.    I agree with the documentation and findings as written by Tammie Shirley and our discussed and formulated assessment and plan. I was present with Tammie who participated in the service and documentation of the note. I have also personally verified the history and personally performed the physical exam and medical decision-making. I agree with the assessment and plan of care as documented in the note.    Roscoe Martinez MD  Internal Medicine  Hospitalist    [facility]  Phone: #[phone]      ______________________________________________________________________    Physical Exam   Vital Signs: Temp: 97.8  F (36.6  C) Temp src: Oral BP: (!) 143/83 Pulse: 89   Resp: 16 SpO2: 100 % O2 Device: None (Room air)    Weight: 120 lbs 0 oz  General Appearance:  Awake and alert, laying in bed. Comfortable, in no acute distress.  Respiratory: Clear to ascultation bilaterally, no rales, rhonchi or wheezing. symmetric chest rise. unlabored respirations.   Cardiovascular: Reguar rate and rhythm. Normal S1, S2. No murmurs, rubs or gallop.   GI: Soft, non-tender. Normoactive bowel sounds. No masses, organomegaly. No rebound or guarding.   Neuro: AOx3. Limbs moving spontaneously and freely. No focal deficits noted at this time.   Psych: Cooperative, behavior is appropriate.       Primary Care Physician   Rebekah Cain    Discharge Orders   No discharge procedures on file.    Significant Results and Procedures   Most Recent 3 CBC's:  Recent Labs   Lab Test 12/07/21  0555 12/06/21  1352 03/13/19 2002   WBC 12.2* 17.5* 9.2   HGB 10.8* 11.5* 11.7*   MCV 83 84 85    313 295     Most Recent 3 BMP's:  Recent Labs   Lab Test 12/08/21  1142 12/07/21  0554 12/06/21  1352    141 138   POTASSIUM 3.6 4.0 4.4   CHLORIDE 108* 111* 105   CO2 22 20* 21*   BUN 18 22 24   CR 1.08 1.26* 1.52*   ANIONGAP 7 10 12   TROY 8.5 8.7 9.1   * 99 126*     Most Recent 3 Creatinines:  Recent Labs   Lab Test 12/08/21  1142 12/07/21  0554 12/06/21  1352   CR 1.08 1.26* 1.52*     Most Recent TSH and T4:  Recent Labs   Lab Test 12/06/21  1752   TSH 1.35     Most Recent Urinalysis:  Recent Labs   Lab Test 12/06/21  1437 03/13/19 2002   COLOR Colorless Yellow   APPEARANCE Clear Clear   URINEGLC Negative Negative   URINEBILI Negative Negative   URINEKETONE 10 * Negative   SG 1.011 1.010   UBLD 0.5 mg/dL* Large*   URINEPH 6.0 6.0   PROTEIN 70 * Negative   UROBILINOGEN  --   <2.0 E.U./dL   NITRITE Negative Negative   LEUKEST Negative Negative   RBCU 15* 0-2   WBCU 1 0-5   ,   Results for orders placed or performed during the hospital encounter of 12/06/21   Head CT w/o contrast    Narrative    EXAM: CT HEAD W/O CONTRAST  LOCATION: [facility]  DATE/TIME: 12/6/2021 3:16 PM    INDICATION: Confusion. Altered mental status.  COMPARISON: None.  TECHNIQUE: Routine CT Head without IV contrast. Multiplanar reformats. Dose reduction techniques were used.    FINDINGS:  INTRACRANIAL CONTENTS: No intracranial hemorrhage, extraaxial collection, or mass effect.  Chronic lacunar infarct in the left thalamus. Mild presumed chronic small vessel ischemic changes. Mild generalized volume loss. No hydrocephalus.     VISUALIZED ORBITS/SINUSES/MASTOIDS: No intraorbital abnormality. No paranasal sinus mucosal disease. No middle ear or mastoid effusion.    BONES/SOFT TISSUES: No acute abnormality.      Impression    IMPRESSION:  1.  No CT evidence for acute intracranial process.  2.  Brain atrophy and presumed chronic microvascular ischemic changes as above.   CT Chest Abdomen Pelvis w/o Contrast    Narrative    EXAM: CT CHEST ABDOMEN PELVIS W/O CONTRAST  LOCATION: [facility]  DATE/TIME: 12/6/2021 3:16 PM    INDICATION: Cough and confusion.  COMPARISON: None.  TECHNIQUE: CT scan of the chest, abdomen, and pelvis was performed without IV contrast. Multiplanar reformats were obtained. Dose reduction techniques were used.   CONTRAST: None.    FINDINGS:   LUNGS AND PLEURA: No focal consolidation, pneumothorax or pleural effusion. There is mild linear atelectasis or scarring in the inferior lingula. No no suspicious nodules or masses. Mild paraseptal emphysema in the upper lobes. Small calcified nodule in   the left lower lobe on image 105 of series 4 likely represents a granuloma.    MEDIASTINUM/AXILLAE: No lymphadenopathy. Normal heart size. No pericardial  effusion. No thoracic aortic aneurysm.    CORONARY ARTERY CALCIFICATION: Mild.    HEPATOBILIARY: Probable small cyst in the left lobe of the liver measuring 6 mm normal calcified gallstones or biliary dilatation.    PANCREAS: Normal.    SPLEEN: Normal.    ADRENAL GLANDS: Normal.    KIDNEYS/BLADDER: Probable small vascular calcification in the right kidney on image 144 of series 3 no hydronephrosis or ureteral stones no abnormal renal masses. No bladder stone or mass.    BOWEL: No bowel obstruction or free intraperitoneal air. Sigmoid diverticulosis without evidence for active diverticulitis or abscess. No evidence for appendicitis.    LYMPH NODES: Normal.    VASCULATURE: Moderate aortoiliac calcification without aneurysm.    PELVIC ORGANS: Normal.    MUSCULOSKELETAL: Mild degenerative change in the spine with disc space narrowing at L5-S1 no fracture or suspicious bony lesion.      Impression    IMPRESSION:  1.  The lungs are clear of focal consolidation. There is no pneumothorax, pleural effusion or thoracic aortic aneurysm. Mild paraseptal emphysema.  2.  No bowel obstruction, free intraperitoneal air, appendicitis diverticulitis or abscess. There is sigmoid diverticulosis.  3.  No ovarian cyst or radiopaque gallstone or ureteral stone. No abdominal aortic aneurysm.  4.  Mild degenerative change lower lumbar spine.       Discharge Medications   Current Discharge Medication List      CONTINUE these medications which have NOT CHANGED    Details   amLODIPine (NORVASC) 10 MG tablet Take 10 mg by mouth daily       famotidine (PEPCID) 20 MG tablet Take 20 mg by mouth 2 times daily as needed      melatonin 3 MG tablet Take 3 mg by mouth nightly as needed for sleep           Allergies   Allergies   Allergen Reactions     Paroxetine Itching     tingling     Sulfa Drugs      Other reaction(s): *Unknown     Valium [Diazepam] Itching

## 2021-12-11 LAB
BACTERIA BLD CULT: NO GROWTH
BACTERIA BLD CULT: NO GROWTH

## 2022-04-14 ENCOUNTER — HOSPITAL ENCOUNTER (EMERGENCY)
Facility: CLINIC | Age: 72
Discharge: ANOTHER HOSPITAL | End: 2022-04-14
Attending: STUDENT IN AN ORGANIZED HEALTH CARE EDUCATION/TRAINING PROGRAM | Admitting: STUDENT IN AN ORGANIZED HEALTH CARE EDUCATION/TRAINING PROGRAM
Payer: COMMERCIAL

## 2022-04-14 ENCOUNTER — HOSPITAL ENCOUNTER (OUTPATIENT)
Dept: NEUROLOGY | Facility: CLINIC | Age: 72
Discharge: HOME | DRG: 100 | End: 2022-04-14
Attending: INTERNAL MEDICINE | Admitting: INTERNAL MEDICINE
Payer: COMMERCIAL

## 2022-04-14 ENCOUNTER — APPOINTMENT (OUTPATIENT)
Dept: RADIOLOGY | Facility: CLINIC | Age: 72
End: 2022-04-14
Attending: STUDENT IN AN ORGANIZED HEALTH CARE EDUCATION/TRAINING PROGRAM
Payer: COMMERCIAL

## 2022-04-14 ENCOUNTER — APPOINTMENT (OUTPATIENT)
Dept: GENERAL RADIOLOGY | Facility: CLINIC | Age: 72
DRG: 100 | End: 2022-04-14
Attending: INTERNAL MEDICINE
Payer: COMMERCIAL

## 2022-04-14 ENCOUNTER — HOSPITAL ENCOUNTER (INPATIENT)
Facility: CLINIC | Age: 72
LOS: 3 days | Discharge: HOME | DRG: 100 | End: 2022-04-17
Attending: INTERNAL MEDICINE | Admitting: INTERNAL MEDICINE
Payer: COMMERCIAL

## 2022-04-14 ENCOUNTER — APPOINTMENT (OUTPATIENT)
Dept: MRI IMAGING | Facility: CLINIC | Age: 72
DRG: 100 | End: 2022-04-14
Attending: INTERNAL MEDICINE
Payer: COMMERCIAL

## 2022-04-14 ENCOUNTER — APPOINTMENT (OUTPATIENT)
Dept: CT IMAGING | Facility: CLINIC | Age: 72
End: 2022-04-14
Attending: STUDENT IN AN ORGANIZED HEALTH CARE EDUCATION/TRAINING PROGRAM
Payer: COMMERCIAL

## 2022-04-14 VITALS
DIASTOLIC BLOOD PRESSURE: 69 MMHG | BODY MASS INDEX: 22.66 KG/M2 | SYSTOLIC BLOOD PRESSURE: 133 MMHG | TEMPERATURE: 98.1 F | HEART RATE: 89 BPM | RESPIRATION RATE: 18 BRPM | OXYGEN SATURATION: 98 % | WEIGHT: 125.88 LBS

## 2022-04-14 DIAGNOSIS — G93.40 ENCEPHALOPATHY: Primary | ICD-10-CM

## 2022-04-14 DIAGNOSIS — R56.9 SEIZURE (H): ICD-10-CM

## 2022-04-14 DIAGNOSIS — A41.9 SEPSIS, DUE TO UNSPECIFIED ORGANISM, UNSPECIFIED WHETHER ACUTE ORGAN DYSFUNCTION PRESENT (H): ICD-10-CM

## 2022-04-14 DIAGNOSIS — G40.901 STATUS EPILEPTICUS (H): ICD-10-CM

## 2022-04-14 DIAGNOSIS — R41.82 ALTERED MENTAL STATUS, UNSPECIFIED ALTERED MENTAL STATUS TYPE: ICD-10-CM

## 2022-04-14 LAB
ALBUMIN SERPL-MCNC: 3.1 G/DL (ref 3.4–5)
ALBUMIN SERPL-MCNC: 3.8 G/DL (ref 3.5–5)
ALBUMIN UR-MCNC: 10 MG/DL
ALP SERPL-CCNC: 56 U/L (ref 40–150)
ALP SERPL-CCNC: 63 U/L (ref 45–120)
ALT SERPL W P-5'-P-CCNC: 18 U/L (ref 0–50)
ALT SERPL W P-5'-P-CCNC: <9 U/L (ref 0–45)
AMMONIA PLAS-SCNC: 32 UMOL/L (ref 11–35)
AMPHETAMINES UR QL SCN: ABNORMAL
ANION GAP SERPL CALCULATED.3IONS-SCNC: 19 MMOL/L (ref 5–18)
ANION GAP SERPL CALCULATED.3IONS-SCNC: 3 MMOL/L (ref 3–14)
APPEARANCE UR: CLEAR
AST SERPL W P-5'-P-CCNC: 17 U/L (ref 0–40)
AST SERPL W P-5'-P-CCNC: 17 U/L (ref 0–45)
ATRIAL RATE - MUSE: 138 BPM
BARBITURATES UR QL: ABNORMAL
BASE EXCESS BLDA CALC-SCNC: -4.3 MMOL/L (ref -9–1.8)
BASE EXCESS BLDA CALC-SCNC: -7.1 MMOL/L
BASOPHILS # BLD AUTO: 0 10E3/UL (ref 0–0.2)
BASOPHILS NFR BLD AUTO: 0 %
BENZODIAZ UR QL: ABNORMAL
BILIRUB DIRECT SERPL-MCNC: 0.1 MG/DL
BILIRUB SERPL-MCNC: 0.2 MG/DL (ref 0.2–1.3)
BILIRUB SERPL-MCNC: 0.3 MG/DL (ref 0–1)
BILIRUB UR QL STRIP: NEGATIVE
BUN SERPL-MCNC: 32 MG/DL (ref 7–30)
BUN SERPL-MCNC: 35 MG/DL (ref 8–28)
C REACTIVE PROTEIN LHE: 0.4 MG/DL (ref 0–0.8)
CA-I BLD-MCNC: 4.4 MG/DL (ref 4.4–5.2)
CALCIUM SERPL-MCNC: 8.2 MG/DL (ref 8.5–10.1)
CALCIUM SERPL-MCNC: 9 MG/DL (ref 8.5–10.5)
CANNABINOIDS UR QL SCN: ABNORMAL
CHLORIDE BLD-SCNC: 111 MMOL/L (ref 98–107)
CHLORIDE BLD-SCNC: 112 MMOL/L (ref 94–109)
CK SERPL-CCNC: 91 U/L (ref 30–225)
CO2 SERPL-SCNC: 13 MMOL/L (ref 22–31)
CO2 SERPL-SCNC: 23 MMOL/L (ref 20–32)
COCAINE UR QL: ABNORMAL
COHGB MFR BLD: 96.7 % (ref 95–96)
COLOR UR AUTO: COLORLESS
CREAT SERPL-MCNC: 1.52 MG/DL (ref 0.52–1.04)
CREAT SERPL-MCNC: 1.64 MG/DL (ref 0.6–1.1)
CREAT UR-MCNC: 50 MG/DL
DIASTOLIC BLOOD PRESSURE - MUSE: 82 MMHG
EOSINOPHIL # BLD AUTO: 0 10E3/UL (ref 0–0.7)
EOSINOPHIL NFR BLD AUTO: 0 %
ERYTHROCYTE [DISTWIDTH] IN BLOOD BY AUTOMATED COUNT: 15.2 % (ref 10–15)
ERYTHROCYTE [DISTWIDTH] IN BLOOD BY AUTOMATED COUNT: 15.3 % (ref 10–15)
ETHANOL SERPL-MCNC: <10 MG/DL
GFR SERPL CREATININE-BSD FRML MDRD: 33 ML/MIN/1.73M2
GFR SERPL CREATININE-BSD FRML MDRD: 36 ML/MIN/1.73M2
GLUCOSE BLD-MCNC: 102 MG/DL (ref 70–99)
GLUCOSE BLD-MCNC: 157 MG/DL (ref 70–125)
GLUCOSE BLDC GLUCOMTR-MCNC: 154 MG/DL (ref 70–99)
GLUCOSE BLDC GLUCOMTR-MCNC: 96 MG/DL (ref 70–99)
GLUCOSE UR STRIP-MCNC: NEGATIVE MG/DL
HCO3 BLD-SCNC: 19 MMOL/L (ref 23–29)
HCO3 BLD-SCNC: 21 MMOL/L (ref 21–28)
HCT VFR BLD AUTO: 38.1 % (ref 35–47)
HCT VFR BLD AUTO: 41.2 % (ref 35–47)
HGB BLD-MCNC: 11.8 G/DL (ref 11.7–15.7)
HGB BLD-MCNC: 12.8 G/DL (ref 11.7–15.7)
HGB UR QL STRIP: ABNORMAL
HOLD SPECIMEN: NORMAL
IMM GRANULOCYTES # BLD: 0.1 10E3/UL
IMM GRANULOCYTES NFR BLD: 0 %
INR PPP: 0.96 (ref 0.85–1.15)
INR PPP: 0.98 (ref 0.85–1.15)
INTERPRETATION ECG - MUSE: NORMAL
KETONES UR STRIP-MCNC: NEGATIVE MG/DL
LACTATE SERPL-SCNC: 0.9 MMOL/L (ref 0.7–2)
LACTATE SERPL-SCNC: 8.5 MMOL/L (ref 0.7–2)
LEUKOCYTE ESTERASE UR QL STRIP: NEGATIVE
LIPASE SERPL-CCNC: 39 U/L (ref 0–52)
LYMPHOCYTES # BLD AUTO: 1.8 10E3/UL (ref 0.8–5.3)
LYMPHOCYTES NFR BLD AUTO: 13 %
MAGNESIUM SERPL-MCNC: 2.4 MG/DL (ref 1.6–2.3)
MCH RBC QN AUTO: 26.8 PG (ref 26.5–33)
MCH RBC QN AUTO: 26.9 PG (ref 26.5–33)
MCHC RBC AUTO-ENTMCNC: 31 G/DL (ref 31.5–36.5)
MCHC RBC AUTO-ENTMCNC: 31.1 G/DL (ref 31.5–36.5)
MCV RBC AUTO: 86 FL (ref 78–100)
MCV RBC AUTO: 87 FL (ref 78–100)
MONOCYTES # BLD AUTO: 0.4 10E3/UL (ref 0–1.3)
MONOCYTES NFR BLD AUTO: 3 %
MUCOUS THREADS #/AREA URNS LPF: PRESENT /LPF
NEUTROPHILS # BLD AUTO: 11.3 10E3/UL (ref 1.6–8.3)
NEUTROPHILS NFR BLD AUTO: 84 %
NITRATE UR QL: NEGATIVE
NRBC # BLD AUTO: 0 10E3/UL
NRBC BLD AUTO-RTO: 0 /100
O2/TOTAL GAS SETTING VFR VENT: 30 %
O2/TOTAL GAS SETTING VFR VENT: 40 %
OPIATES UR QL SCN: ABNORMAL
OXYCODONE UR QL: ABNORMAL
OXYHGB MFR BLD: 94.7 % (ref 95–96)
OXYHGB MFR BLD: 98 % (ref 92–100)
P AXIS - MUSE: 91 DEGREES
PCO2 BLD: 36 MM HG (ref 35–45)
PCO2 BLD: 38 MM HG (ref 35–45)
PCP UR QL SCN: ABNORMAL
PEEP: 5 CM H2O
PH BLD: 7.31 [PH] (ref 7.37–7.44)
PH BLD: 7.36 [PH] (ref 7.35–7.45)
PH UR STRIP: 5.5 [PH] (ref 5–7)
PHOSPHATE SERPL-MCNC: 3.3 MG/DL (ref 2.5–4.5)
PLATELET # BLD AUTO: 233 10E3/UL (ref 150–450)
PLATELET # BLD AUTO: 278 10E3/UL (ref 150–450)
PO2 BLD: 105 MM HG (ref 80–105)
PO2 BLD: 84 MM HG (ref 75–85)
POTASSIUM BLD-SCNC: 4.1 MMOL/L (ref 3.5–5)
POTASSIUM BLD-SCNC: 4.2 MMOL/L (ref 3.4–5.3)
PR INTERVAL - MUSE: 136 MS
PROCALCITONIN SERPL-MCNC: <0.05 NG/ML
PROT SERPL-MCNC: 6.6 G/DL (ref 6.8–8.8)
PROT SERPL-MCNC: 7.2 G/DL (ref 6–8)
QRS DURATION - MUSE: 76 MS
QT - MUSE: 280 MS
QTC - MUSE: 426 MS
R AXIS - MUSE: -52 DEGREES
RATE: 18 RR/MIN
RBC # BLD AUTO: 4.38 10E6/UL (ref 3.8–5.2)
RBC # BLD AUTO: 4.77 10E6/UL (ref 3.8–5.2)
RBC URINE: 2 /HPF
SARS-COV-2 RNA RESP QL NAA+PROBE: NEGATIVE
SODIUM SERPL-SCNC: 138 MMOL/L (ref 133–144)
SODIUM SERPL-SCNC: 143 MMOL/L (ref 136–145)
SP GR UR STRIP: 1.01 (ref 1–1.03)
SQUAMOUS EPITHELIAL: <1 /HPF
SYSTOLIC BLOOD PRESSURE - MUSE: 128 MMHG
T AXIS - MUSE: 86 DEGREES
TEMPERATURE: 37 DEGREES C
TROPONIN I SERPL HS-MCNC: 124 NG/L
TROPONIN I SERPL-MCNC: 0.03 NG/ML (ref 0–0.29)
UROBILINOGEN UR STRIP-MCNC: <2 MG/DL
VENTILATION MODE: AC
VENTILATOR TIDAL VOLUME: 400 ML
VENTRICULAR RATE- MUSE: 139 BPM
WBC # BLD AUTO: 13.5 10E3/UL (ref 4–11)
WBC # BLD AUTO: 13.6 10E3/UL (ref 4–11)
WBC URINE: <1 /HPF

## 2022-04-14 PROCEDURE — 85610 PROTHROMBIN TIME: CPT | Performed by: STUDENT IN AN ORGANIZED HEALTH CARE EDUCATION/TRAINING PROGRAM

## 2022-04-14 PROCEDURE — 84484 ASSAY OF TROPONIN QUANT: CPT | Performed by: INTERNAL MEDICINE

## 2022-04-14 PROCEDURE — 96367 TX/PROPH/DG ADDL SEQ IV INF: CPT | Mod: 59

## 2022-04-14 PROCEDURE — 82077 ASSAY SPEC XCP UR&BREATH IA: CPT | Performed by: STUDENT IN AN ORGANIZED HEALTH CARE EDUCATION/TRAINING PROGRAM

## 2022-04-14 PROCEDURE — 250N000009 HC RX 250: Performed by: INTERNAL MEDICINE

## 2022-04-14 PROCEDURE — 250N000011 HC RX IP 250 OP 636: Performed by: STUDENT IN AN ORGANIZED HEALTH CARE EDUCATION/TRAINING PROGRAM

## 2022-04-14 PROCEDURE — 255N000002 HC RX 255 OP 636: Performed by: INTERNAL MEDICINE

## 2022-04-14 PROCEDURE — 93005 ELECTROCARDIOGRAM TRACING: CPT

## 2022-04-14 PROCEDURE — 99285 EMERGENCY DEPT VISIT HI MDM: CPT | Mod: 25

## 2022-04-14 PROCEDURE — 81001 URINALYSIS AUTO W/SCOPE: CPT | Performed by: EMERGENCY MEDICINE

## 2022-04-14 PROCEDURE — 250N000009 HC RX 250: Performed by: STUDENT IN AN ORGANIZED HEALTH CARE EDUCATION/TRAINING PROGRAM

## 2022-04-14 PROCEDURE — 83690 ASSAY OF LIPASE: CPT | Performed by: STUDENT IN AN ORGANIZED HEALTH CARE EDUCATION/TRAINING PROGRAM

## 2022-04-14 PROCEDURE — 96365 THER/PROPH/DIAG IV INF INIT: CPT | Mod: 59

## 2022-04-14 PROCEDURE — 999N000158 HC STATISTIC RCP TIME ED VENT EA 10 MIN

## 2022-04-14 PROCEDURE — 85610 PROTHROMBIN TIME: CPT | Performed by: INTERNAL MEDICINE

## 2022-04-14 PROCEDURE — 999N000185 HC STATISTIC TRANSPORT TIME EA 15 MIN

## 2022-04-14 PROCEDURE — 80053 COMPREHEN METABOLIC PANEL: CPT | Performed by: INTERNAL MEDICINE

## 2022-04-14 PROCEDURE — 258N000003 HC RX IP 258 OP 636: Performed by: STUDENT IN AN ORGANIZED HEALTH CARE EDUCATION/TRAINING PROGRAM

## 2022-04-14 PROCEDURE — 84145 PROCALCITONIN (PCT): CPT | Performed by: INTERNAL MEDICINE

## 2022-04-14 PROCEDURE — 80307 DRUG TEST PRSMV CHEM ANLYZR: CPT | Performed by: STUDENT IN AN ORGANIZED HEALTH CARE EDUCATION/TRAINING PROGRAM

## 2022-04-14 PROCEDURE — A9585 GADOBUTROL INJECTION: HCPCS | Performed by: INTERNAL MEDICINE

## 2022-04-14 PROCEDURE — 87635 SARS-COV-2 COVID-19 AMP PRB: CPT | Performed by: STUDENT IN AN ORGANIZED HEALTH CARE EDUCATION/TRAINING PROGRAM

## 2022-04-14 PROCEDURE — 82140 ASSAY OF AMMONIA: CPT | Performed by: STUDENT IN AN ORGANIZED HEALTH CARE EDUCATION/TRAINING PROGRAM

## 2022-04-14 PROCEDURE — 999N000157 HC STATISTIC RCP TIME EA 10 MIN

## 2022-04-14 PROCEDURE — 258N000003 HC RX IP 258 OP 636: Performed by: INTERNAL MEDICINE

## 2022-04-14 PROCEDURE — 93010 ELECTROCARDIOGRAM REPORT: CPT | Performed by: INTERNAL MEDICINE

## 2022-04-14 PROCEDURE — 83735 ASSAY OF MAGNESIUM: CPT | Performed by: INTERNAL MEDICINE

## 2022-04-14 PROCEDURE — 999N000065 XR CHEST PORT 1 VIEW

## 2022-04-14 PROCEDURE — 70496 CT ANGIOGRAPHY HEAD: CPT

## 2022-04-14 PROCEDURE — 94002 VENT MGMT INPAT INIT DAY: CPT

## 2022-04-14 PROCEDURE — 96366 THER/PROPH/DIAG IV INF ADDON: CPT | Mod: 59

## 2022-04-14 PROCEDURE — 86140 C-REACTIVE PROTEIN: CPT | Performed by: STUDENT IN AN ORGANIZED HEALTH CARE EDUCATION/TRAINING PROGRAM

## 2022-04-14 PROCEDURE — 93005 ELECTROCARDIOGRAM TRACING: CPT | Mod: 59 | Performed by: STUDENT IN AN ORGANIZED HEALTH CARE EDUCATION/TRAINING PROGRAM

## 2022-04-14 PROCEDURE — 82550 ASSAY OF CK (CPK): CPT | Performed by: INTERNAL MEDICINE

## 2022-04-14 PROCEDURE — 95705 EEG W/O VID 2-12 HR UNMNTR: CPT

## 2022-04-14 PROCEDURE — C9803 HOPD COVID-19 SPEC COLLECT: HCPCS

## 2022-04-14 PROCEDURE — 84484 ASSAY OF TROPONIN QUANT: CPT | Performed by: STUDENT IN AN ORGANIZED HEALTH CARE EDUCATION/TRAINING PROGRAM

## 2022-04-14 PROCEDURE — 83605 ASSAY OF LACTIC ACID: CPT | Performed by: STUDENT IN AN ORGANIZED HEALTH CARE EDUCATION/TRAINING PROGRAM

## 2022-04-14 PROCEDURE — 31500 INSERT EMERGENCY AIRWAY: CPT

## 2022-04-14 PROCEDURE — 83605 ASSAY OF LACTIC ACID: CPT | Performed by: INTERNAL MEDICINE

## 2022-04-14 PROCEDURE — 96376 TX/PRO/DX INJ SAME DRUG ADON: CPT | Mod: 59

## 2022-04-14 PROCEDURE — 70553 MRI BRAIN STEM W/O & W/DYE: CPT

## 2022-04-14 PROCEDURE — 94640 AIRWAY INHALATION TREATMENT: CPT

## 2022-04-14 PROCEDURE — 82805 BLOOD GASES W/O2 SATURATION: CPT | Performed by: INTERNAL MEDICINE

## 2022-04-14 PROCEDURE — 250N000011 HC RX IP 250 OP 636

## 2022-04-14 PROCEDURE — 36415 COLL VENOUS BLD VENIPUNCTURE: CPT | Performed by: STUDENT IN AN ORGANIZED HEALTH CARE EDUCATION/TRAINING PROGRAM

## 2022-04-14 PROCEDURE — 36415 COLL VENOUS BLD VENIPUNCTURE: CPT | Performed by: INTERNAL MEDICINE

## 2022-04-14 PROCEDURE — 200N000001 HC R&B ICU

## 2022-04-14 PROCEDURE — 87040 BLOOD CULTURE FOR BACTERIA: CPT | Performed by: STUDENT IN AN ORGANIZED HEALTH CARE EDUCATION/TRAINING PROGRAM

## 2022-04-14 PROCEDURE — 82962 GLUCOSE BLOOD TEST: CPT

## 2022-04-14 PROCEDURE — 82330 ASSAY OF CALCIUM: CPT | Performed by: INTERNAL MEDICINE

## 2022-04-14 PROCEDURE — 82805 BLOOD GASES W/O2 SATURATION: CPT | Performed by: STUDENT IN AN ORGANIZED HEALTH CARE EDUCATION/TRAINING PROGRAM

## 2022-04-14 PROCEDURE — 96368 THER/DIAG CONCURRENT INF: CPT | Mod: 59

## 2022-04-14 PROCEDURE — 99291 CRITICAL CARE FIRST HOUR: CPT | Performed by: INTERNAL MEDICINE

## 2022-04-14 PROCEDURE — 36600 WITHDRAWAL OF ARTERIAL BLOOD: CPT

## 2022-04-14 PROCEDURE — 95717 EEG PHYS/QHP 2-12 HR W/O VID: CPT | Performed by: PSYCHIATRY & NEUROLOGY

## 2022-04-14 PROCEDURE — 94640 AIRWAY INHALATION TREATMENT: CPT | Mod: 76

## 2022-04-14 PROCEDURE — 82248 BILIRUBIN DIRECT: CPT | Performed by: STUDENT IN AN ORGANIZED HEALTH CARE EDUCATION/TRAINING PROGRAM

## 2022-04-14 PROCEDURE — 85027 COMPLETE CBC AUTOMATED: CPT | Performed by: INTERNAL MEDICINE

## 2022-04-14 PROCEDURE — 96375 TX/PRO/DX INJ NEW DRUG ADDON: CPT | Mod: 59

## 2022-04-14 PROCEDURE — 85004 AUTOMATED DIFF WBC COUNT: CPT | Performed by: STUDENT IN AN ORGANIZED HEALTH CARE EDUCATION/TRAINING PROGRAM

## 2022-04-14 PROCEDURE — 84100 ASSAY OF PHOSPHORUS: CPT | Performed by: INTERNAL MEDICINE

## 2022-04-14 PROCEDURE — 250N000011 HC RX IP 250 OP 636: Performed by: INTERNAL MEDICINE

## 2022-04-14 PROCEDURE — 250N000013 HC RX MED GY IP 250 OP 250 PS 637: Performed by: INTERNAL MEDICINE

## 2022-04-14 PROCEDURE — 70450 CT HEAD/BRAIN W/O DYE: CPT

## 2022-04-14 RX ORDER — NALOXONE HYDROCHLORIDE 0.4 MG/ML
0.4 INJECTION, SOLUTION INTRAMUSCULAR; INTRAVENOUS; SUBCUTANEOUS
Status: DISCONTINUED | OUTPATIENT
Start: 2022-04-14 | End: 2022-04-17 | Stop reason: HOSPADM

## 2022-04-14 RX ORDER — ENOXAPARIN SODIUM 100 MG/ML
30 INJECTION SUBCUTANEOUS EVERY 24 HOURS
Status: DISCONTINUED | OUTPATIENT
Start: 2022-04-15 | End: 2022-04-17

## 2022-04-14 RX ORDER — NICOTINE POLACRILEX 4 MG
15-30 LOZENGE BUCCAL
Status: DISCONTINUED | OUTPATIENT
Start: 2022-04-14 | End: 2022-04-17 | Stop reason: HOSPADM

## 2022-04-14 RX ORDER — ONDANSETRON 2 MG/ML
4 INJECTION INTRAMUSCULAR; INTRAVENOUS EVERY 6 HOURS PRN
Status: DISCONTINUED | OUTPATIENT
Start: 2022-04-14 | End: 2022-04-17 | Stop reason: HOSPADM

## 2022-04-14 RX ORDER — AMOXICILLIN 250 MG
1 CAPSULE ORAL 2 TIMES DAILY PRN
Status: DISCONTINUED | OUTPATIENT
Start: 2022-04-14 | End: 2022-04-17 | Stop reason: HOSPADM

## 2022-04-14 RX ORDER — ETOMIDATE 2 MG/ML
20 INJECTION INTRAVENOUS ONCE
Status: COMPLETED | OUTPATIENT
Start: 2022-04-14 | End: 2022-04-14

## 2022-04-14 RX ORDER — ONDANSETRON 4 MG/1
4 TABLET, ORALLY DISINTEGRATING ORAL EVERY 6 HOURS PRN
Status: DISCONTINUED | OUTPATIENT
Start: 2022-04-14 | End: 2022-04-17 | Stop reason: HOSPADM

## 2022-04-14 RX ORDER — NALOXONE HYDROCHLORIDE 0.4 MG/ML
0.2 INJECTION, SOLUTION INTRAMUSCULAR; INTRAVENOUS; SUBCUTANEOUS
Status: DISCONTINUED | OUTPATIENT
Start: 2022-04-14 | End: 2022-04-17 | Stop reason: HOSPADM

## 2022-04-14 RX ORDER — LEVETIRACETAM 5 MG/ML
500 INJECTION INTRAVASCULAR EVERY 12 HOURS
Status: DISCONTINUED | OUTPATIENT
Start: 2022-04-15 | End: 2022-04-17 | Stop reason: HOSPADM

## 2022-04-14 RX ORDER — IPRATROPIUM BROMIDE AND ALBUTEROL SULFATE 2.5; .5 MG/3ML; MG/3ML
3 SOLUTION RESPIRATORY (INHALATION) EVERY 4 HOURS
Status: DISCONTINUED | OUTPATIENT
Start: 2022-04-14 | End: 2022-04-16

## 2022-04-14 RX ORDER — POLYETHYLENE GLYCOL 3350 17 G/17G
17 POWDER, FOR SOLUTION ORAL DAILY PRN
Status: DISCONTINUED | OUTPATIENT
Start: 2022-04-14 | End: 2022-04-17 | Stop reason: HOSPADM

## 2022-04-14 RX ORDER — FENTANYL CITRATE 50 UG/ML
100 INJECTION, SOLUTION INTRAMUSCULAR; INTRAVENOUS ONCE
Status: COMPLETED | OUTPATIENT
Start: 2022-04-14 | End: 2022-04-14

## 2022-04-14 RX ORDER — LORAZEPAM 2 MG/ML
2 INJECTION INTRAMUSCULAR ONCE
Status: DISCONTINUED | OUTPATIENT
Start: 2022-04-14 | End: 2022-04-14

## 2022-04-14 RX ORDER — GADOBUTROL 604.72 MG/ML
6 INJECTION INTRAVENOUS ONCE
Status: DISCONTINUED | OUTPATIENT
Start: 2022-04-14 | End: 2022-04-14

## 2022-04-14 RX ORDER — PROPOFOL 10 MG/ML
5-75 INJECTION, EMULSION INTRAVENOUS CONTINUOUS
Status: DISCONTINUED | OUTPATIENT
Start: 2022-04-14 | End: 2022-04-14 | Stop reason: HOSPADM

## 2022-04-14 RX ORDER — IOPAMIDOL 755 MG/ML
100 INJECTION, SOLUTION INTRAVASCULAR ONCE
Status: COMPLETED | OUTPATIENT
Start: 2022-04-14 | End: 2022-04-14

## 2022-04-14 RX ORDER — LEVETIRACETAM 10 MG/ML
1000 INJECTION INTRAVASCULAR ONCE
Status: COMPLETED | OUTPATIENT
Start: 2022-04-14 | End: 2022-04-14

## 2022-04-14 RX ORDER — HYDROMORPHONE HYDROCHLORIDE 1 MG/ML
.3-.5 INJECTION, SOLUTION INTRAMUSCULAR; INTRAVENOUS; SUBCUTANEOUS
Status: DISCONTINUED | OUTPATIENT
Start: 2022-04-14 | End: 2022-04-16

## 2022-04-14 RX ORDER — PROPOFOL 10 MG/ML
5-75 INJECTION, EMULSION INTRAVENOUS CONTINUOUS
Status: DISCONTINUED | OUTPATIENT
Start: 2022-04-14 | End: 2022-04-15

## 2022-04-14 RX ORDER — ENOXAPARIN SODIUM 100 MG/ML
30 INJECTION SUBCUTANEOUS EVERY 24 HOURS
Status: DISCONTINUED | OUTPATIENT
Start: 2022-04-14 | End: 2022-04-14

## 2022-04-14 RX ORDER — LORAZEPAM 2 MG/ML
INJECTION INTRAMUSCULAR
Status: COMPLETED
Start: 2022-04-14 | End: 2022-04-14

## 2022-04-14 RX ORDER — CHLORHEXIDINE GLUCONATE ORAL RINSE 1.2 MG/ML
15 SOLUTION DENTAL EVERY 12 HOURS
Status: DISCONTINUED | OUTPATIENT
Start: 2022-04-14 | End: 2022-04-15 | Stop reason: CLARIF

## 2022-04-14 RX ORDER — DEXTROSE MONOHYDRATE 25 G/50ML
25-50 INJECTION, SOLUTION INTRAVENOUS
Status: DISCONTINUED | OUTPATIENT
Start: 2022-04-14 | End: 2022-04-17 | Stop reason: HOSPADM

## 2022-04-14 RX ORDER — BISACODYL 10 MG
10 SUPPOSITORY, RECTAL RECTAL DAILY PRN
Status: DISCONTINUED | OUTPATIENT
Start: 2022-04-14 | End: 2022-04-17 | Stop reason: HOSPADM

## 2022-04-14 RX ORDER — GADOBUTROL 604.72 MG/ML
6 INJECTION INTRAVENOUS ONCE
Status: COMPLETED | OUTPATIENT
Start: 2022-04-14 | End: 2022-04-14

## 2022-04-14 RX ORDER — SODIUM CHLORIDE 9 MG/ML
INJECTION, SOLUTION INTRAVENOUS CONTINUOUS
Status: DISCONTINUED | OUTPATIENT
Start: 2022-04-14 | End: 2022-04-16

## 2022-04-14 RX ORDER — PIPERACILLIN SODIUM, TAZOBACTAM SODIUM 3; .375 G/15ML; G/15ML
3.38 INJECTION, POWDER, LYOPHILIZED, FOR SOLUTION INTRAVENOUS ONCE
Status: COMPLETED | OUTPATIENT
Start: 2022-04-14 | End: 2022-04-14

## 2022-04-14 RX ORDER — AMOXICILLIN 250 MG
2 CAPSULE ORAL 2 TIMES DAILY PRN
Status: DISCONTINUED | OUTPATIENT
Start: 2022-04-14 | End: 2022-04-17 | Stop reason: HOSPADM

## 2022-04-14 RX ORDER — LORAZEPAM 2 MG/ML
2 INJECTION INTRAMUSCULAR ONCE
Status: COMPLETED | OUTPATIENT
Start: 2022-04-14 | End: 2022-04-14

## 2022-04-14 RX ADMIN — FAMOTIDINE 20 MG: 10 INJECTION, SOLUTION INTRAVENOUS at 17:14

## 2022-04-14 RX ADMIN — PROPOFOL 50 MCG/KG/MIN: 10 INJECTION, EMULSION INTRAVENOUS at 19:37

## 2022-04-14 RX ADMIN — SODIUM CHLORIDE 1000 ML: 9 INJECTION, SOLUTION INTRAVENOUS at 09:25

## 2022-04-14 RX ADMIN — IPRATROPIUM BROMIDE AND ALBUTEROL SULFATE 3 ML: .5; 3 SOLUTION RESPIRATORY (INHALATION) at 19:12

## 2022-04-14 RX ADMIN — LORAZEPAM 2 MG: 2 INJECTION INTRAMUSCULAR; INTRAVENOUS at 08:57

## 2022-04-14 RX ADMIN — PIPERACILLIN AND TAZOBACTAM 3.38 G: 3; .375 INJECTION, POWDER, LYOPHILIZED, FOR SOLUTION INTRAVENOUS at 10:21

## 2022-04-14 RX ADMIN — GADOBUTROL 6 ML: 604.72 INJECTION INTRAVENOUS at 20:49

## 2022-04-14 RX ADMIN — FENTANYL CITRATE 100 MCG: 50 INJECTION, SOLUTION INTRAMUSCULAR; INTRAVENOUS at 11:05

## 2022-04-14 RX ADMIN — PROPOFOL 50 MCG/KG/MIN: 10 INJECTION, EMULSION INTRAVENOUS at 22:41

## 2022-04-14 RX ADMIN — LORAZEPAM 2 MG: 2 INJECTION INTRAMUSCULAR at 08:57

## 2022-04-14 RX ADMIN — ROCURONIUM BROMIDE 80 MG: 100 INJECTION, SOLUTION INTRAVENOUS at 09:15

## 2022-04-14 RX ADMIN — SODIUM CHLORIDE: 9 INJECTION, SOLUTION INTRAVENOUS at 16:48

## 2022-04-14 RX ADMIN — FENTANYL CITRATE 100 MCG: 50 INJECTION, SOLUTION INTRAMUSCULAR; INTRAVENOUS at 13:45

## 2022-04-14 RX ADMIN — FENTANYL CITRATE 100 MCG: 50 INJECTION, SOLUTION INTRAMUSCULAR; INTRAVENOUS at 12:35

## 2022-04-14 RX ADMIN — PROPOFOL 20 MCG/KG/MIN: 10 INJECTION, EMULSION INTRAVENOUS at 09:18

## 2022-04-14 RX ADMIN — VANCOMYCIN HYDROCHLORIDE 1000 MG: 5 INJECTION, POWDER, LYOPHILIZED, FOR SOLUTION INTRAVENOUS at 11:16

## 2022-04-14 RX ADMIN — IOPAMIDOL 75 ML: 755 INJECTION, SOLUTION INTRAVENOUS at 13:07

## 2022-04-14 RX ADMIN — CHLORHEXIDINE GLUCONATE 15 ML: 1.2 RINSE ORAL at 22:40

## 2022-04-14 RX ADMIN — ETOMIDATE 20 MG: 40 INJECTION, SOLUTION INTRAVENOUS at 09:14

## 2022-04-14 RX ADMIN — LEVETIRACETAM 1000 MG: 10 INJECTION INTRAVENOUS at 17:13

## 2022-04-14 RX ADMIN — HYDROMORPHONE HYDROCHLORIDE 0.5 MG: 1 INJECTION, SOLUTION INTRAMUSCULAR; INTRAVENOUS; SUBCUTANEOUS at 19:47

## 2022-04-14 RX ADMIN — IPRATROPIUM BROMIDE AND ALBUTEROL SULFATE 3 ML: .5; 3 SOLUTION RESPIRATORY (INHALATION) at 16:00

## 2022-04-14 ASSESSMENT — ACTIVITIES OF DAILY LIVING (ADL)
ADLS_ACUITY_SCORE: 16
ADLS_ACUITY_SCORE: 16
ADLS_ACUITY_SCORE: 12
ADLS_ACUITY_SCORE: 12
ADLS_ACUITY_SCORE: 14
ADLS_ACUITY_SCORE: 16
ADLS_ACUITY_SCORE: 16
ADLS_ACUITY_SCORE: 12

## 2022-04-14 NOTE — ED NOTES
Nestor RT; Dillan, RT; Marya MILLARD RN; Dr. Camarena; LORI Alonso student; Jeb WARD EDT; Fifi CARLISLE RN; Theodora MARTINEZ at bedside in preparation of intubation.

## 2022-04-14 NOTE — PROGRESS NOTES
[facility] ICU RESPIRATORY NOTE         Date of Admission: 4/14/2022     Date of Intubation (most recent): 4/14/22     Reason for Mechanical Ventilation: Airway protection     Number of Days on Mechanical Ventilation: 1     Met Criteria for Spontaneous Breathing Trial: No     Reason for No Spontaneous Breathing Trial: Per MD     Significant Events Today: None     Recent Labs   Lab 04/14/22  1624 04/14/22  0949   PH 7.36 7.31*   PCO2 36 38   PO2 105 84   HCO3 21 19*   O2PER 30 40     Current Vent Settings:   Vent Mode: CMV/AC  (Continuous Mandatory Ventilation/ Assist Control)  FiO2 (%): 30 %  Resp Rate (Set): 14 breaths/min  Tidal Volume (Set, mL): 350 mL  PEEP (cm H2O): 5 cmH2O  Resp: 18

## 2022-04-14 NOTE — ED PROVIDER NOTES
EMERGENCY DEPARTMENT ENCOUNTER       ED Course & Medical Decision Making   8:50 AM I met the patient and performed my initial interview and exam.  8:55 AM patient had a witnessed seizure by staff. O2 saturations dropped to 79%  8:57 AM patient was given 2mg Ativan  9:01 AM patient was started on 2L O2 oxymask  9:11 AM Blood Glucose 154  9:14 AM patient given 20mg etomidate   9:15 AM patient given 80mg rocuronium   9:15 AM patient was intubated, 25cm at the teeth.   9:52 AM I spoke with Dr. Figueroa, Intensivist, regarding patient plan of care. Recommends admission at [facility] or somewhere with inpatient neurology.   10:13 AM I spoke with Dr. Figueroa again regarding patient plan of care.   10:17 AM I spoke with the patient's daughter to update her on patient plan of care.   10:17 AM I called radiology tech about getting the patient's CT completed.   10:34 AM I spoke with Dr. Yap, intensivist at [facility], regarding patient transfer for admission.     Final Impression  71 year old female brought in by EMS for evaluation of confusion.  Patient's daughter went to check on her this morning, found her to be very confused, thought she was somewhat slow to answer questions, sent her here for evaluation.  Patient had a UTI with some confusion in early December/2021, was admitted here, though symptoms ultimately resolved with UTI treatment and patient subsequently returned to baseline.  Patient initially alert, oriented to self, place, though slightly off on the date by 2 days, endorsing some mild nausea, though otherwise fairly well-appearing on initial evaluation.  Shortly after initial exam patient had a witnessed generalized tonic-clonic seizure in the ED, lasted about 4 minutes before was broken with 2 mg IV Ativan.  Patient did have tongue biting, did desat to about 71% before being placed on nasal cannula.  Between her postictal period and the Ativan patient was still needing a jaw thrust about 15-20 minutes after  her initial seizure, thus was ultimately intubated for airway protection.  Intubation fairly straightforward, formed as documented below.  Patient not febrile, no obvious nuchal rigidity on exam, low suspicion for meningitis somewhat lower.  Unable to do lumbar puncture until CT has returned, CT head without contrast ultimately fairly unremarkable.  At this juncture, IV antibiotics had already been started, multiple other patients in the department requiring emergent procedures, unable to perform LP before patient left the department.  Did obtain CTA head and neck at the request of accepting hospitalist at [facility], those results still pending at time of discharge.  Labs returned showing mild leukocytosis of 13.6, lactic acid of 8.5.  Creatinine moderately elevated at 1.6, baseline appears to be closer to 1.  UDS positive for marijuana, though daughter did report the patient does smoke marijuana about every other day.  Troponin negative.  LFTs and lipase unremarkable.  UDS fairly unremarkable.  Did discuss plan of transfer with patient's daughter.  On discussion with patient's daughter, she actually reported that patient has had a few episodes over the last few months where she woke up with blood on her shirt and blood in her mouth, somewhat indicative of possible underlying seizure with tongue biting in the middle the night, though will need to be transferred to a facility where EEG monitoring is available.  ICU bed available here, though no inpatient neurology available which patient will likely need.  Closest available bed with inpatient neurology is [facility], patient will be transferred there by EMS.      Medications   propofol (DIPRIVAN) infusion (50 mcg/kg/min × 57.1 kg Intravenous Rate/Dose Verify 4/14/22 1320)   fentaNYL (PF) (SUBLIMAZE) injection 100 mcg (has no administration in time range)   LORazepam (ATIVAN) injection 2 mg (2 mg Intravenous Given 4/14/22 7919)   etomidate (AMIDATE) injection  20 mg (20 mg Intravenous Given 4/14/22 0914)   rocuronium injection 80 mg (80 mg Intravenous Given 4/14/22 0915)   0.9% sodium chloride BOLUS (0 mLs Intravenous Stopped 4/14/22 1130)   piperacillin-tazobactam (ZOSYN) 3.375 g vial to attach to  mL bag (0 g Intravenous Stopped 4/14/22 1100)   fentaNYL (PF) (SUBLIMAZE) injection 100 mcg (100 mcg Intravenous Given 4/14/22 1105)   vancomycin 1000 mg in 0.9% NaCl 250 mL intermittent infusion 1,000 mg (0 mg Intravenous Stopped 4/14/22 1220)   fentaNYL (PF) (SUBLIMAZE) injection 100 mcg (100 mcg Intravenous Given 4/14/22 1235)   iopamidol (ISOVUE-370) solution 100 mL (75 mLs Intravenous Given 4/14/22 1307)       Final Impression     1. Seizure (H)    2. Altered mental status, unspecified altered mental status type    3. Sepsis, due to unspecified organism, unspecified whether acute organ dysfunction present (H)        Critical Care     Performed by: Tim Razo MD  Authorized by: Tim Razo MD                   Total critical care time: 75 minutes  Critical care was necessary to treat or prevent imminent or life-threatening deterioration of the following conditions: Seizure, AMS, requiring intubation for failure to protect airway  Critical care was time spent personally by me on the following activities: development of treatment plan with patient or surrogate, discussions with consultants, examination of patient, evaluation of patient's response to treatment, obtaining history from patient or surrogate, ordering and performing treatments and interventions, ordering and review of laboratory studies, ordering and review of radiographic studies, re-evaluation of patient's condition and monitoring for potential decompensation.  Critical care time was exclusive of separately billable procedures and treating other patients.    Chief Complaint     Chief Complaint   Patient presents with     Altered Mental Status     Pt arrived via EMS for altered mental  status. Was difficult to arouse at home when EMS arrived but became more alert and oriented during the ambulance ride. Has a history of hypertension and she smokes marijuana. Was spitting up bloody sputum and was given 8mg zofran before arrival.     HPI     Bita Stephenson is a 71 year old female with history of CKD and UTI who presents for evaluation of confusion.    Per chart review, patient was seen in the [facility] ED and admitted to the hospital from 12/6/21-12/8/21 for evaluation of confusion and urinary incontinence. She was found to have a UTI. Patient was given IV Ceftrixone x 2 days then transitioned to oral Cefdinir for 5 total days. No signs of sepsis. Blood and urine cultures showed no growth at 24 hours. Patient's chest abdomen pelvis CT was unremarkable. Head CT showed brain atrophy and presumed chronic microvascular ischemic changes. No evidence of acute intracranial process. She had marked improvement from A%O x0 to x3. Patient was discharged home on 12/8/21.    Patient reports that she was disoriented and confused this morning so her daughter called EMS. Patient lives with her daughter. The patient reports that she feels fine and had onset of nausea this morning but has not vomited. She had a UTI about 4 months ago and says that she was similarly confused at that time. Patient has not taken anything for her symptoms. She denies alcohol use. She uses marijuana every other day. Patient denies recent travel. Denies headache, vision changes, cold, cough, sore throat, shortness of breath, chest pain, palpitations, vomiting, abdominal pain, diarrhea, constipation, blood in stool, dysuria, hematuria, numbness, weakness, swelling, or any other complaints at this time.     I, Rickey Garcia am serving as a scribe to document services personally performed by Dr. Tim Razo MD, based on my observation and the provider's statements to me. I, Dr. Tim Razo MD attest that Rickey Garcia is  "acting in a scribe capacity, has observed my performance of the services and has documented them in accordance with my direction.    Past Medical History     History reviewed. No pertinent past medical history.  Past Surgical History:   Procedure Laterality Date     NO PAST SURGERIES       Family History   Problem Relation Age of Onset     Sudden Death Mother 62.00         at home, no autopsy     Heart Disease Mother         she had an unspecified type of heart surgery one month before her death     No Known Problems Son      No Known Problems Son      Diabetes Type 2  Son      No Known Problems Daughter      No Known Problems Daughter       Social History     Tobacco Use     Smoking status: Former Smoker     Packs/day: 1.50     Years: 28.00     Pack years: 42.00     Types: Cigarettes, Cigarettes     Quit date: 2001     Years since quittin.2     Smokeless tobacco: Never Used   Substance Use Topics     Alcohol use: No     Comment: Alcoholic Drinks/day: quit drinking in      Drug use: Yes     Types: Marijuana     Comment: Drug use: \"occasional\"     Allergies   Allergen Reactions     Paroxetine Itching     tingling     Sulfa Drugs      Other reaction(s): *Unknown     Valium [Diazepam] Itching       Relevant past medical, surgical, family and social history as documented above, has been reviewed and discussed with patient. No changes or additions, unless otherwise noted in the HPI.    Current Medications     amLODIPine (NORVASC) 10 MG tablet  famotidine (PEPCID) 20 MG tablet  melatonin 3 MG tablet        Review of Systems     ROS somewhat limited due to clinical condition (post-ictal)    Constitutional: Denies fever, chills  Eyes: Denies visual changes  HENT: Denies sore throat, ear pain or neck pain  Respiratory: Denies cough or shortness of breath    Cardiovascular: Denies chest pain, palpitations or leg swelling  GI: Positive for nausea. Denies abdominal pain, vomiting  Musculoskeletal: Denies any " new back pain  Skin: Denies rashes or wound  Neurologic: Positive for confusion. Denies current headache, new weakness, focal weakness    Remainder of systems reviewed, unless noted in HPI all others negative.    Physical Exam     BP (!) 161/84   Pulse 99   Temp 98.1  F (36.7  C) (Oral)   Resp 13   Wt 57.1 kg (125 lb 14.1 oz)   SpO2 100%   BMI 22.66 kg/m    Constitutional: Awake, alert, in no acute distress  Head: Normocephalic, atraumatic.  ENT: Mucous membranes moist.   Eyes: PERRL, EOMI, Conjunctiva normal  Respiratory: Respirations even, unlabored. Lungs clear to ascultation bilaterally, in no acute respiratory distress.  Cardiovascular: Regular rate and rhythm. +2 radial pulses, equal bilaterally. No pitting edema.   GI: Abdomen soft, non-tender. No guarding or rebound. Bowel sounds intact on all 4 quadrants.   : No CVA tenderness.    Musculoskeletal: Moves all 4 extremities equally, strength symmetrical on bilateral uppers and lowers.  Integument: Warm, dry.   Neurologic: Alert & oriented x 3. Normal speech. Grossly normal motor and sensory function. No focal deficits noted.  Psychiatric: Normal mood    Labs & Imaging     Results for orders placed or performed during the hospital encounter of 04/14/22   XR Chest Port 1 View    Impression    IMPRESSION:     Patient is slightly rotated RPO. Endotracheal tube is in satisfactory position. Gastric tube courses below the diaphragmatic hiatus and extends to the stomach body. Telemetry leads overlie the chest.    Cardiac silhouette is normal in size. Tortuous and mildly ectatic thoracic aorta.    Symmetric lung inflation. No interstitial thickening, peribronchial fluid cuffs or airspace opacities.    No pleural fluid or pneumothorax visible on this single supine view.   Head CT w/o contrast    Impression    IMPRESSION:  1.  No intracranial hemorrhage, mass lesions, hydrocephalus or CT evidence for an acute infarct.  2.  Mild diffuse cerebral parenchymal volume  loss. Presumed chronic hypertensive/microvascular ischemic white matter changes.  3.  Chronic lacunae in the left thalamus.   4.  1.7 x 2.7 x 1.6 cm cyst involving the right maxillary alveolus involving the roots of teeth #7 and 8. This is likely odontogenic cyst.   UA with Microscopic reflex to Culture    Specimen: Urine, Midstream   Result Value Ref Range    Color Urine Colorless Colorless, Straw, Light Yellow, Yellow    Appearance Urine Clear Clear    Glucose Urine Negative Negative mg/dL    Bilirubin Urine Negative Negative    Ketones Urine Negative Negative mg/dL    Specific Gravity Urine 1.013 1.001 - 1.030    Blood Urine 0.06 mg/dL (A) Negative    pH Urine 5.5 5.0 - 7.0    Protein Albumin Urine 10  (A) Negative mg/dL    Urobilinogen Urine <2.0 <2.0 mg/dL    Nitrite Urine Negative Negative    Leukocyte Esterase Urine Negative Negative    Mucus Urine Present (A) None Seen /LPF    RBC Urine 2 <=2 /HPF    WBC Urine <1 <=5 /HPF    Squamous Epithelials Urine <1 <=1 /HPF   Extra Red Top Tube   Result Value Ref Range    Hold Specimen JIC    Extra Green Top (Lithium Heparin) Tube   Result Value Ref Range    Hold Specimen JIC    Extra Purple Top Tube   Result Value Ref Range    Hold Specimen JIC    Extra Green Top (Lithium Heparin) ON ICE   Result Value Ref Range    Hold Specimen JIC    Result Value Ref Range    INR 0.96 0.85 - 1.15   Glucose by meter   Result Value Ref Range    GLUCOSE BY METER POCT 154 (H) 70 - 99 mg/dL   Basic metabolic panel   Result Value Ref Range    Sodium 143 136 - 145 mmol/L    Potassium 4.1 3.5 - 5.0 mmol/L    Chloride 111 (H) 98 - 107 mmol/L    Carbon Dioxide (CO2) 13 (L) 22 - 31 mmol/L    Anion Gap 19 (H) 5 - 18 mmol/L    Urea Nitrogen 35 (H) 8 - 28 mg/dL    Creatinine 1.64 (H) 0.60 - 1.10 mg/dL    Calcium 9.0 8.5 - 10.5 mg/dL    Glucose 157 (H) 70 - 125 mg/dL    GFR Estimate 33 (L) >60 mL/min/1.73m2   Troponin I (now)   Result Value Ref Range    Troponin I 0.03 0.00 - 0.29 ng/mL   Lactic  acid whole blood   Result Value Ref Range    Lactic Acid 8.5 (HH) 0.7 - 2.0 mmol/L   Hepatic function panel   Result Value Ref Range    Bilirubin Total 0.3 0.0 - 1.0 mg/dL    Bilirubin Direct 0.1 <=0.5 mg/dL    Protein Total 7.2 6.0 - 8.0 g/dL    Albumin 3.8 3.5 - 5.0 g/dL    Alkaline Phosphatase 63 45 - 120 U/L    AST 17 0 - 40 U/L    ALT <9 0 - 45 U/L   Result Value Ref Range    Lipase 39 0 - 52 U/L   Ammonia (on ice)   Result Value Ref Range    Ammonia 32 11 - 35 umol/L   Alcohol level blood   Result Value Ref Range    Alcohol, Blood <10 None detected mg/dL   CRP inflammation   Result Value Ref Range    CRP 0.4 0.0 - 0.8 mg/dL   CBC with platelets and differential   Result Value Ref Range    WBC Count 13.6 (H) 4.0 - 11.0 10e3/uL    RBC Count 4.77 3.80 - 5.20 10e6/uL    Hemoglobin 12.8 11.7 - 15.7 g/dL    Hematocrit 41.2 35.0 - 47.0 %    MCV 86 78 - 100 fL    MCH 26.8 26.5 - 33.0 pg    MCHC 31.1 (L) 31.5 - 36.5 g/dL    RDW 15.3 (H) 10.0 - 15.0 %    Platelet Count 278 150 - 450 10e3/uL    % Neutrophils 84 %    % Lymphocytes 13 %    % Monocytes 3 %    % Eosinophils 0 %    % Basophils 0 %    % Immature Granulocytes 0 %    NRBCs per 100 WBC 0 <1 /100    Absolute Neutrophils 11.3 (H) 1.6 - 8.3 10e3/uL    Absolute Lymphocytes 1.8 0.8 - 5.3 10e3/uL    Absolute Monocytes 0.4 0.0 - 1.3 10e3/uL    Absolute Eosinophils 0.0 0.0 - 0.7 10e3/uL    Absolute Basophils 0.0 0.0 - 0.2 10e3/uL    Absolute Immature Granulocytes 0.1 <=0.4 10e3/uL    Absolute NRBCs 0.0 10e3/uL   Drugs of Abuse 1 Panel, Urine ([facility])   Result Value Ref Range    Amphetamines Urine Screen Negative Screen Negative    Benzodiazepines Urine Screen Negative Screen Negative    Opiates Urine Screen Negative Screen Negative    PCP Urine Screen Negative Screen Negative    Cannabinoids Urine Screen Positive (A) Screen Negative    Barbiturates Urine Screen Negative Screen Negative    Cocaine Urine Screen Negative Screen Negative    Oxycodone Urine Screen  Negative Screen Negative    Creatinine Urine mg/dL 50 mg/dL   Asymptomatic COVID-19 Virus (Coronavirus) by PCR Nasopharyngeal    Specimen: Nasopharyngeal; Swab   Result Value Ref Range    SARS CoV2 PCR Negative Negative   Blood gas arterial   Result Value Ref Range    pH Arterial 7.31 (L) 7.37 - 7.44    pCO2 Arterial 38 35 - 45 mm Hg    pO2 Arterial 84 75 - 85 mm Hg    Bicarbonate Arterial 19 (L) 23 - 29 mmol/L    O2 Sat, Arterial 96.7 (H) 95.0 - 96.0 %    Oxyhemoglobin 94.7 (L) 95.0 - 96.0 %    Base Excess/Deficit (+/-) -7.1   mmol/L    Ventilation Mode ac     Rate 18 rr/min    FIO2 40     Peep 5 cm H2O    Sample Stabilized Temperature 37.0 degrees C    Ventilator Tidal Volume 400 mL   ECG 12-Lead with MUSE - [facility]   Result Value Ref Range    Systolic Blood Pressure 128 mmHg    Diastolic Blood Pressure 82 mmHg    Ventricular Rate 139 BPM    Atrial Rate 138 BPM    ME Interval 136 ms    QRS Duration 76 ms     ms    QTc 426 ms    P Axis 91 degrees    R AXIS -52 degrees    T Axis 86 degrees    Interpretation ECG       Undetermined rhythm  Left axis deviation  Septal infarct (cited on or before 06-DEC-2021)  Abnormal ECG  When compared with ECG of 08-DEC-2021 09:51,  Current undetermined rhythm precludes rhythm comparison, needs review  ST now depressed in Inferior leads  Nonspecific T wave abnormality no longer evident in Inferior leads  T wave inversion now evident in Lateral leads  Confirmed by SEE ED PROVIDER NOTE FOR, ECG INTERPRETATION ([id number]),  ROBERT NGO ([id number]) on 4/14/2022 10:07:27 AM           EKG     Sinus tachycardia, rate 139.  .  QRS 76.  QTc 426.  No STEMI.    Procedures     PROCEDURE: Rapid Sequence Intubation   INDICATIONS: Airway Protection   PROCEDURE PROVIDER: Dr Tim Razo   CONSENT: Consent for procedure was not obtained. Consent is implied given the emergent need.   PROCEDURE SPECIFIC CHECKLIST COMPLETED: Yes   TIME OUT: Universal protocol was followed. TIME OUT  conducted just prior to starting procedure confirmed patient identity, site/side, procedure, patient position, and availability of correct equipment. Yes   MEDICATIONS: Etomidate, 20 mg, IV and Rocuronium, 20 mg IV   TUBE DETAILS: 7.5 tube, at 25 cm at the teeth   EQUIPMENT USED: Glidescope, size 3   POST-INTUBATION ASSESSMENT/NOTE: Difficulty of intubation:  Easy, straightforward    Post-intubation pulmonary exam:  equal and absent over the epigastrium    ET Tube placement was confirmed with:  auscultation with good, equal bilateral breath sounds, absence of breath sounds over the epigastrium, fog in the tube, colorimetric CO2 detector (good color change) and pulse oximeter readings stable or improving    Lowest oxygen saturation was 96%    Monitoring consisted of:  heart rate, cardiac monitor, continuous pulse oximeter, frequent blood pressure checks, IV access, constant attendance by RN until patient is recovered and constant attendance by MD until patient is stable     COMPLICATIONS: Patient tolerated procedure well, without complication          Tim Razo MD  04/14/22 [id number]

## 2022-04-14 NOTE — ED TRIAGE NOTES
Pt arrived via EMS for altered mental status. Was difficult to arouse at home when EMS arrived but became more alert and oriented during the ambulance ride. Has a history of hypertension and she smokes marijuana. Was spitting up bloody sputum and was given 8mg zofran before arrival.

## 2022-04-14 NOTE — PHARMACY-ADMISSION MEDICATION HISTORY
Pharmacy Medication History  Admission medication history interview status for the 4/14/2022  admission is complete. See EPIC admission navigator for prior to admission medications     Location of Interview: Patient room  Medication history sources: Patient's family/friend (Daughter)    Significant changes made to the medication list:  none    In the past week, patient estimated taking medication this percent of the time: greater than 90%    Additional medication history information:   none    Medication reconciliation completed by provider prior to medication history? No    Time spent in this activity: 15 minutes    Prior to Admission medications    Medication Sig Last Dose Taking? Auth Provider   amLODIPine (NORVASC) 10 MG tablet Take 10 mg by mouth daily  4/13/2022 at Unknown time Yes Provider, Historical   famotidine (PEPCID) 20 MG tablet Take 20 mg by mouth 2 times daily as needed PRN Yes Unknown, Entered By History   melatonin 3 MG tablet Take 3 mg by mouth nightly as needed for sleep PRN Yes Unknown, Entered By History       The information provided in this note is only as accurate as the sources available at the time of update(s)

## 2022-04-14 NOTE — H&P
Critical Care  Note      04/14/2022    Name: Bita Stephenson MRN#: [id number]   Age: 71 year old YOB: 1950     [facility]  [facility]    [facility] DAY #             Problem List:   Active Problems:    Status epilepticus (H)    1. Status epilepticus - etiology unclear and now seems resolved on propofol. Her head CT is negative, and after discussion with daughter (who lives with her) there is no history of trauma, prior seizures, alcohol or substance abuse, nor recent symptoms suggestive of infection. At this time neuroCCM will be consulted, EEG, likely MRI tomorrow, and defer need for LP and choice of ongoing seizure meds to them.   2. Acute respiratory failure - for airway protection. Will continue on vent at present and plan to move towards extubation tomorrow after more thorough evaluation complete.   3. HTN - continue routine meds   4. Former smoker- possible COPD on CXR and will treat duonebs at present. She has no history of COPD/asthma per se.   5. ID/possible aspiration pneumonia- no evidence at present nor other evidence of infection, and will not continue antibiotics at present.   6. CKD - at about baseline creatinine 1.7  Overall, acute and critical but compensated at this moment. I spent 40 minutes of critical care time with her today.            Summary/Hospital Course:     She had a depressed level of consciousness per daughter this morning, and EMS called. Per ED doc, she had a grand mal seizure there and was ultimately intubated for airway protection. Initial work up was negative, she was started on propofol after receiving 1 dose of ativan 2 mgms IV. She was transferred here for further evaluation.       Assessment and plan :     Bita Stephenson IS a 71 year old female admitted on 4/14/2022 for status epilepticus and acute respiratory failure.   I have personally reviewed the daily labs, imaging studies, cultures and discussed the case with referring physician and consulting physicians.     My  assessment and plan by system for this patient is as follows:    Neurology/Psychiatry:   1. Sedated on vent after witnessed grand mal seizure- neuro work up pending     Cardiovascular:   1.Hemodynamics - well compensated at this time.     Pulmonary/Ventilator Management:   1. On minimal vent support; will plan trial of extubation tomorrow.     GI and Nutrition :   1. Consider enteral tomorrow if not extubated     Renal/Fluids/Electrolytes:   1. Seems to be at baseline creatinine at 1.64    Infectious Disease:   1. As above, no evidence of infection at this time and will not continue antibiotics.     Endocrine:   1. Monitor for hyperglycemia     Hematology/Oncology:   1. Hb oki     IV/Access:   1. Venous access -   2. Arterial access -   3.  Plan  - central access required and necessary      ICU Prophylaxis:   1. DVT: Hep Subq/ LMWH/mechanical  2. VAP: HOB 30 degrees, chlorhexidine rinse  3. Stress Ulcer: PPI/H2 blocker  4. Restraints: Nonviolent soft two point restraints required and necessary for patient safety and continued cares and good effect as patient continues to pull at necessary lines, tubes despite education and distraction. Will readdress daily.   5. Wound care  -   6. Feeding - PO's later when extubated   7. Family Update: discussed with daughter via phone; she will be in later to visit.   8. Disposition - ICU care         Key goals for next 24 hours:   1. Completed neuro work up  2. Plan extubation in AM  3.               Medical History:     No past medical history on file.  Past Surgical History:   Procedure Laterality Date     NO PAST SURGERIES       Social History     Socioeconomic History     Marital status:      Spouse name: Not on file     Number of children: 5     Years of education: Not on file     Highest education level: Not on file   Occupational History     Not on file   Tobacco Use     Smoking status: Former Smoker     Packs/day: 1.50     Years: 28.00     Pack years: 42.00      "Types: Cigarettes, Cigarettes     Quit date: 2001     Years since quittin.2     Smokeless tobacco: Never Used   Substance and Sexual Activity     Alcohol use: No     Comment: Alcoholic Drinks/day: quit drinking in 2017     Drug use: Yes     Types: Marijuana     Comment: Drug use: \"occasional\"     Sexual activity: Not Currently     Partners: Male   Other Topics Concern     Not on file   Social History Narrative    Her youngest daughter, Alice, lives with her in Bita's home.     Social Determinants of Health     Financial Resource Strain: Not on file   Food Insecurity: Not on file   Transportation Needs: Not on file   Physical Activity: Not on file   Stress: Not on file   Social Connections: Not on file   Intimate Partner Violence: Not on file   Housing Stability: Not on file        Allergies   Allergen Reactions     Paroxetine Itching     tingling     Sulfa Drugs      Other reaction(s): *Unknown     Valium [Diazepam] Itching              Key Medications:       chlorhexidine  15 mL Mouth/Throat Q12H     [START ON 4/15/2022] enoxaparin ANTICOAGULANT  30 mg Subcutaneous Q24H     famotidine  20 mg Intravenous Q12H     ipratropium - albuterol 0.5 mg/2.5 mg/3 mL  3 mL Nebulization Q4H     [START ON 4/15/2022] pantoprazole  40 mg Per Feeding Tube QAM AC    Or     [START ON 4/15/2022] pantoprazole (PROTONIX) IV  40 mg Intravenous QAM AC       propofol (DIPRIVAN) infusion      And     - MEDICATION INSTRUCTIONS -       sodium chloride          Home Meds  [COMPLETED] 0.9% sodium chloride BOLUS  [COMPLETED] etomidate (AMIDATE) injection 20 mg  [COMPLETED] fentaNYL (PF) (SUBLIMAZE) injection 100 mcg  [COMPLETED] fentaNYL (PF) (SUBLIMAZE) injection 100 mcg  [COMPLETED] fentaNYL (PF) (SUBLIMAZE) injection 100 mcg  [COMPLETED] iopamidol (ISOVUE-370) solution 100 mL  [COMPLETED] LORazepam (ATIVAN) injection 2 mg  [COMPLETED] piperacillin-tazobactam (ZOSYN) 3.375 g vial to attach to  mL bag  [COMPLETED] rocuronium " injection 80 mg  [COMPLETED] vancomycin 1000 mg in 0.9% NaCl 250 mL intermittent infusion 1,000 mg    amLODIPine (NORVASC) 10 MG tablet, Take 10 mg by mouth daily   famotidine (PEPCID) 20 MG tablet, Take 20 mg by mouth 2 times daily as needed  melatonin 3 MG tablet, Take 3 mg by mouth nightly as needed for sleep               Physical Examination:   Temp:  [98.1  F (36.7  C)-99.5  F (37.5  C)] 99.5  F (37.5  C)  Pulse:  [] 86  Resp:  [13-28] 18  BP: (107-217)/() 147/82  FiO2 (%):  [30 %-40 %] 30 %  SpO2:  [86 %-100 %] 100 %  No intake or output data in the 24 hours ending 04/14/22 1546  Wt Readings from Last 4 Encounters:   04/14/22 57.1 kg (125 lb 14.1 oz)   12/08/21 54.4 kg (120 lb)   02/01/18 64.4 kg (142 lb)     BP - Mean:  [] 109  Vent Mode: CMV/AC  (Continuous Mandatory Ventilation/ Assist Control)  FiO2 (%): 30 %  Resp Rate (Set): 18 breaths/min  Tidal Volume (Set, mL): 400 mL  PEEP (cm H2O): 5 cmH2O  Resp: 18    Recent Labs   Lab 04/14/22  0949   PH 7.31*   PCO2 38   PO2 84   HCO3 19*   O2PER 40       GEN: no acute distress; comfortable on ventilator   HEENT: head ncat, sclera anicteric, OP patent, trachea midline   PULM: unlabored synchronous with vent, clear anteriorly    CV/COR: RRR S1S2 no gallop,  No rub, no murmur  ABD: soft nontender, hypoactive bowel sounds, no mass  EXT:  Edema   warm  NEURO: grossly intact; moves extremities to stimulation; pupils pinpoint but reactive; corneals present; OC's present; gag present; and breathes above vent.   SKIN: no obvious rash; no cyanosis or mottling   LINES: clean, dry intact         Data:   All data and imaging reviewed     ROUTINE ICU LABS (Last four results)  CMP  Recent Labs   Lab 04/14/22  1504 04/14/22  0912 04/14/22  0911   NA  --  143  --    POTASSIUM  --  4.1  --    CHLORIDE  --  111*  --    CO2  --  13*  --    ANIONGAP  --  19*  --    GLC 96 157* 154*   BUN  --  35*  --    CR  --  1.64*  --    GFRESTIMATED  --  33*  --    TROY  --   9.0  --    PROTTOTAL  --  7.2  --    ALBUMIN  --  3.8  --    BILITOTAL  --  0.3  --    ALKPHOS  --  63  --    AST  --  17  --    ALT  --  <9  --      CBC  Recent Labs   Lab 04/14/22  1535 04/14/22  0912   WBC 13.5* 13.6*   RBC 4.38 4.77   HGB 11.8 12.8   HCT 38.1 41.2   MCV 87 86   MCH 26.9 26.8   MCHC 31.0* 31.1*   RDW 15.2* 15.3*    278     INR  Recent Labs   Lab 04/14/22  0917   INR 0.96     Arterial Blood Gas  Recent Labs   Lab 04/14/22  0949   PH 7.31*   PCO2 38   PO2 84   HCO3 19*   O2PER 40       All cultures:  No results for input(s): CULT in the last 168 hours.  Recent Results (from the past 24 hour(s))   XR Chest Port 1 View    Narrative    EXAM: XR CHEST PORT 1 VIEW  LOCATION: [facility]  DATE/TIME: 4/14/2022 9:23 AM    INDICATION: post intubation  COMPARISON: CT of the chest without contrast 12/6/2021      Impression    IMPRESSION:     Patient is slightly rotated RPO. Endotracheal tube is in satisfactory position. Gastric tube courses below the diaphragmatic hiatus and extends to the stomach body. Telemetry leads overlie the chest.    Cardiac silhouette is normal in size. Tortuous and mildly ectatic thoracic aorta.    Symmetric lung inflation. No interstitial thickening, peribronchial fluid cuffs or airspace opacities.    No pleural fluid or pneumothorax visible on this single supine view.   Head CT w/o contrast    Narrative    EXAM: CT HEAD W/O CONTRAST  LOCATION: [facility]  DATE/TIME: 4/14/2022 10:30 AM    INDICATION: AMS, new onset seizure  COMPARISON: Head CT 12/06/2021  TECHNIQUE: Routine CT Head without IV contrast. Multiplanar reformats. Dose reduction techniques were used.    FINDINGS:  INTRACRANIAL CONTENTS: No intracranial hemorrhage. Mild diffuse cerebral parenchymal volume loss. No midline shift. The basilar cisterns are patent. Mild periventricular and scattered foci of deep white matter hypodensities involving both cerebral    hemispheres. No CT evidence for an acute infarct. Chronic lacunae in the left thalamus.     VISUALIZED ORBITS/SINUSES/MASTOIDS: No intraorbital abnormality. Mild chronic mucosal thickening of the right maxillary sinus. The rest of the paranasal sinuses are clear. Mild opacification of the right mastoid tip.    BONES/SOFT TISSUES: No suspicious calvarial lesions. 1.7 x 2.7 x 1.6 cm cyst involving the right maxillary alveolus involving the roots of teeth #7 and 8.      Impression    IMPRESSION:  1.  No intracranial hemorrhage, mass lesions, hydrocephalus or CT evidence for an acute infarct.  2.  Mild diffuse cerebral parenchymal volume loss. Presumed chronic hypertensive/microvascular ischemic white matter changes.  3.  Chronic lacunae in the left thalamus.   4.  1.7 x 2.7 x 1.6 cm cyst involving the right maxillary alveolus involving the roots of teeth #7 and 8. This is likely odontogenic cyst.   CTA Head Neck with Contrast    Narrative    EXAM: CTA  HEAD NECK WITH CONTRAST  LOCATION: [facility]  DATE/TIME: 4/14/2022 1:06 PM    INDICATION: Altered mental status. Seizure.  COMPARISON: CT head without contrast 04/14/2022.  CONTRAST: Isovue 370 75mL   TECHNIQUE: Head and neck CT angiogram with IV contrast. Noncontrast head CT followed by axial helical CT images of the head and neck vessels obtained during the arterial phase of intravenous contrast administration. Axial 2D reconstructed images and   multiplanar 3D MIP reconstructed images of the head and neck vessels were performed by the technologist. Dose reduction techniques were used. All stenosis measurements made according to NASCET criteria unless otherwise specified.    FINDINGS:   HEAD CTA:  ANTERIOR CIRCULATION: Mild atherosclerotic changes of the intracranial vasculature. No aneurysm or high flow vascular malformation. Fetal origin of both posterior cerebral arteries from the anterior circulation.    POSTERIOR CIRCULATION: Mild  atherosclerotic changes of the intracranial vasculature. No aneurysm or high flow vascular malformation. Balanced vertebral arteries supply a normal basilar artery.     DURAL VENOUS SINUSES: Expected enhancement of the major dural venous sinuses.    NECK CTA:  RIGHT CAROTID: Atherosclerotic vascular calcifications of the right carotid bifurcation and proximal right ICA without measurable stenosis.    LEFT CAROTID: Minimal atherosclerotic vascular calcifications of the proximal left ICA without measurable stenosis based on NASCET criteria.    VERTEBRAL ARTERIES: No focal stenosis or dissection. Balanced vertebral arteries.    AORTIC ARCH: Classic aortic arch anatomy with no significant stenosis at the origin of the great vessels.    NONVASCULAR STRUCTURES: Unremarkable.      Impression    IMPRESSION:   HEAD CTA:   1.  No significant stenosis, aneurysm, or high flow vascular malformation identified.  2.  Variant Cheesh-Na of Castillo anatomy as above.    NECK CTA:  1.  No measurable stenosis of the bilateral internal carotid arteries based on NASCET criteria.         MD Cody    Billing: This patient is critically ill: Yes. Total critical care time today 40 min.

## 2022-04-14 NOTE — PROGRESS NOTES
BP (!) 144/95   Pulse (!) 130   Temp 98.1  F (36.7  C) (Temporal)   Resp 20   Wt 57.1 kg (125 lb 14.1 oz)   SpO2 100%   BMI 22.66 kg/m        Pt was intubated by ER MD on first attempted. Pt spo2 was 100% before attempt. ETT placement was confirmed by ETCO2, BS and CXR. Initial vent ok's by . Pt has a 7.5 ETT and secured at 25cm at the teeth. RT will continue to follow.

## 2022-04-14 NOTE — PROGRESS NOTES
Peewee's test performed prior to radial ABG draw. Collateral circulation confirmed.    Shari Cantor, RT on 4/14/2022 at 4:26 PM

## 2022-04-14 NOTE — PHARMACY-VANCOMYCIN DOSING SERVICE
Pharmacy Vancomycin Initial Note  Date of Service 2022  Patient's  1950  71 year old, female    Indication: Aspiration Pneumonia and Sepsis    Current estimated CrCl = Estimated Creatinine Clearance: 27.5 mL/min (A) (based on SCr of 1.64 mg/dL (H)).    Creatinine for last 3 days  2022:  9:12 AM Creatinine 1.64 mg/dL    Recent Vancomycin Level(s) for last 3 days  No results found for requested labs within last 72 hours.      Vancomycin IV Administrations (past 72 hours)      No vancomycin orders with administrations in past 72 hours.                Nephrotoxins and other renal medications (From now, onward)    Start     Dose/Rate Route Frequency Ordered Stop    22 1100  vancomycin 1000 mg in 0.9% NaCl 250 mL intermittent infusion 1,000 mg         1,000 mg  over 60 Minutes Intravenous ONCE 22 1053            Contrast Orders - past 72 hours (72h ago, onward)            None          Plan:  1. Give vancomycin 1,000 mg IV once in ED.   2. Please re-consult pharmacy to dose vancomycin if want to continue order inpatient.     Thank you for the consult.   Daniela Perez, PharmD, BCPS

## 2022-04-15 ENCOUNTER — APPOINTMENT (OUTPATIENT)
Dept: CARDIOLOGY | Facility: CLINIC | Age: 72
DRG: 100 | End: 2022-04-15
Attending: INTERNAL MEDICINE
Payer: COMMERCIAL

## 2022-04-15 ENCOUNTER — APPOINTMENT (OUTPATIENT)
Dept: GENERAL RADIOLOGY | Facility: CLINIC | Age: 72
DRG: 100 | End: 2022-04-15
Attending: SURGERY
Payer: COMMERCIAL

## 2022-04-15 PROBLEM — G93.81: Status: RESOLVED | Noted: 2022-04-15 | Resolved: 2022-04-15

## 2022-04-15 PROBLEM — J96.01 ACUTE RESPIRATORY FAILURE WITH HYPOXIA (H): Status: ACTIVE | Noted: 2022-04-15

## 2022-04-15 PROBLEM — G93.81: Status: ACTIVE | Noted: 2022-04-15

## 2022-04-15 PROBLEM — G93.40 ENCEPHALOPATHY: Status: ACTIVE | Noted: 2022-04-15

## 2022-04-15 LAB
ALBUMIN SERPL-MCNC: 2.8 G/DL (ref 3.4–5)
ANION GAP SERPL CALCULATED.3IONS-SCNC: 6 MMOL/L (ref 3–14)
BASE EXCESS BLDA CALC-SCNC: -4.9 MMOL/L (ref -9–1.8)
BUN SERPL-MCNC: 24 MG/DL (ref 7–30)
CALCIUM SERPL-MCNC: 8.1 MG/DL (ref 8.5–10.1)
CHLORIDE BLD-SCNC: 115 MMOL/L (ref 94–109)
CO2 SERPL-SCNC: 21 MMOL/L (ref 20–32)
CREAT SERPL-MCNC: 1.56 MG/DL (ref 0.52–1.04)
ERYTHROCYTE [DISTWIDTH] IN BLOOD BY AUTOMATED COUNT: 15.6 % (ref 10–15)
GFR SERPL CREATININE-BSD FRML MDRD: 35 ML/MIN/1.73M2
GLUCOSE BLD-MCNC: 91 MG/DL (ref 70–99)
GLUCOSE BLDC GLUCOMTR-MCNC: 106 MG/DL (ref 70–99)
GLUCOSE BLDC GLUCOMTR-MCNC: 109 MG/DL (ref 70–99)
GLUCOSE BLDC GLUCOMTR-MCNC: 115 MG/DL (ref 70–99)
GLUCOSE BLDC GLUCOMTR-MCNC: 116 MG/DL (ref 70–99)
GLUCOSE BLDC GLUCOMTR-MCNC: 96 MG/DL (ref 70–99)
HCO3 BLD-SCNC: 21 MMOL/L (ref 21–28)
HCT VFR BLD AUTO: 37 % (ref 35–47)
HGB BLD-MCNC: 11.5 G/DL (ref 11.7–15.7)
LACTATE SERPL-SCNC: 1.4 MMOL/L (ref 0.7–2)
LVEF ECHO: NORMAL
MCH RBC QN AUTO: 27.2 PG (ref 26.5–33)
MCHC RBC AUTO-ENTMCNC: 31.1 G/DL (ref 31.5–36.5)
MCV RBC AUTO: 88 FL (ref 78–100)
O2/TOTAL GAS SETTING VFR VENT: 30 %
PCO2 BLD: 39 MM HG (ref 35–45)
PH BLD: 7.33 [PH] (ref 7.35–7.45)
PHOSPHATE SERPL-MCNC: 4.1 MG/DL (ref 2.5–4.5)
PLATELET # BLD AUTO: 216 10E3/UL (ref 150–450)
PO2 BLD: 72 MM HG (ref 80–105)
POTASSIUM BLD-SCNC: 3.6 MMOL/L (ref 3.4–5.3)
PROLACTIN SERPL-MCNC: 22 UG/L (ref 3–27)
RBC # BLD AUTO: 4.23 10E6/UL (ref 3.8–5.2)
SODIUM SERPL-SCNC: 142 MMOL/L (ref 133–144)
T4 FREE SERPL-MCNC: 1.17 NG/DL (ref 0.76–1.46)
TSH SERPL DL<=0.005 MIU/L-ACNC: 2.8 MU/L (ref 0.4–4)
WBC # BLD AUTO: 9.8 10E3/UL (ref 4–11)

## 2022-04-15 PROCEDURE — 94640 AIRWAY INHALATION TREATMENT: CPT | Mod: 76

## 2022-04-15 PROCEDURE — 85027 COMPLETE CBC AUTOMATED: CPT | Performed by: INTERNAL MEDICINE

## 2022-04-15 PROCEDURE — 84146 ASSAY OF PROLACTIN: CPT | Performed by: INTERNAL MEDICINE

## 2022-04-15 PROCEDURE — 999N000065 XR ABDOMEN PORT 1 VIEWS

## 2022-04-15 PROCEDURE — 250N000011 HC RX IP 250 OP 636: Performed by: STUDENT IN AN ORGANIZED HEALTH CARE EDUCATION/TRAINING PROGRAM

## 2022-04-15 PROCEDURE — 999N000157 HC STATISTIC RCP TIME EA 10 MIN

## 2022-04-15 PROCEDURE — 5A1945Z RESPIRATORY VENTILATION, 24-96 CONSECUTIVE HOURS: ICD-10-PCS | Performed by: INTERNAL MEDICINE

## 2022-04-15 PROCEDURE — 250N000011 HC RX IP 250 OP 636: Performed by: INTERNAL MEDICINE

## 2022-04-15 PROCEDURE — 94003 VENT MGMT INPAT SUBQ DAY: CPT

## 2022-04-15 PROCEDURE — 255N000002 HC RX 255 OP 636: Performed by: INTERNAL MEDICINE

## 2022-04-15 PROCEDURE — 250N000009 HC RX 250: Performed by: INTERNAL MEDICINE

## 2022-04-15 PROCEDURE — 83605 ASSAY OF LACTIC ACID: CPT | Performed by: INTERNAL MEDICINE

## 2022-04-15 PROCEDURE — 250N000011 HC RX IP 250 OP 636: Performed by: PEDIATRICS

## 2022-04-15 PROCEDURE — 94640 AIRWAY INHALATION TREATMENT: CPT

## 2022-04-15 PROCEDURE — 84443 ASSAY THYROID STIM HORMONE: CPT | Performed by: INTERNAL MEDICINE

## 2022-04-15 PROCEDURE — 258N000003 HC RX IP 258 OP 636: Performed by: INTERNAL MEDICINE

## 2022-04-15 PROCEDURE — 99291 CRITICAL CARE FIRST HOUR: CPT | Mod: 25 | Performed by: STUDENT IN AN ORGANIZED HEALTH CARE EDUCATION/TRAINING PROGRAM

## 2022-04-15 PROCEDURE — 200N000001 HC R&B ICU

## 2022-04-15 PROCEDURE — 999N000208 ECHOCARDIOGRAM COMPLETE

## 2022-04-15 PROCEDURE — 80069 RENAL FUNCTION PANEL: CPT | Performed by: INTERNAL MEDICINE

## 2022-04-15 PROCEDURE — 84439 ASSAY OF FREE THYROXINE: CPT | Performed by: INTERNAL MEDICINE

## 2022-04-15 PROCEDURE — 36415 COLL VENOUS BLD VENIPUNCTURE: CPT | Performed by: INTERNAL MEDICINE

## 2022-04-15 PROCEDURE — 93306 TTE W/DOPPLER COMPLETE: CPT | Mod: 26 | Performed by: INTERNAL MEDICINE

## 2022-04-15 PROCEDURE — 99291 CRITICAL CARE FIRST HOUR: CPT | Performed by: INTERNAL MEDICINE

## 2022-04-15 PROCEDURE — 250N000013 HC RX MED GY IP 250 OP 250 PS 637: Performed by: INTERNAL MEDICINE

## 2022-04-15 PROCEDURE — 82803 BLOOD GASES ANY COMBINATION: CPT | Performed by: INTERNAL MEDICINE

## 2022-04-15 RX ORDER — AMLODIPINE BESYLATE 5 MG/1
5 TABLET ORAL DAILY
Status: DISCONTINUED | OUTPATIENT
Start: 2022-04-15 | End: 2022-04-16

## 2022-04-15 RX ORDER — LORAZEPAM 2 MG/ML
2 INJECTION INTRAMUSCULAR ONCE
Status: COMPLETED | OUTPATIENT
Start: 2022-04-15 | End: 2022-04-15

## 2022-04-15 RX ADMIN — LEVETIRACETAM 500 MG: 5 INJECTION INTRAVENOUS at 17:31

## 2022-04-15 RX ADMIN — IPRATROPIUM BROMIDE AND ALBUTEROL SULFATE 3 ML: .5; 3 SOLUTION RESPIRATORY (INHALATION) at 05:47

## 2022-04-15 RX ADMIN — PROPOFOL 30 MCG/KG/MIN: 10 INJECTION, EMULSION INTRAVENOUS at 12:14

## 2022-04-15 RX ADMIN — FAMOTIDINE 20 MG: 10 INJECTION, SOLUTION INTRAVENOUS at 17:30

## 2022-04-15 RX ADMIN — IPRATROPIUM BROMIDE AND ALBUTEROL SULFATE 3 ML: .5; 3 SOLUTION RESPIRATORY (INHALATION) at 06:51

## 2022-04-15 RX ADMIN — AMLODIPINE BESYLATE 5 MG: 5 TABLET ORAL at 12:53

## 2022-04-15 RX ADMIN — PROPOFOL 50 MCG/KG/MIN: 10 INJECTION, EMULSION INTRAVENOUS at 03:02

## 2022-04-15 RX ADMIN — LEVETIRACETAM 500 MG: 5 INJECTION INTRAVENOUS at 05:24

## 2022-04-15 RX ADMIN — SODIUM CHLORIDE: 9 INJECTION, SOLUTION INTRAVENOUS at 08:03

## 2022-04-15 RX ADMIN — CHLORHEXIDINE GLUCONATE 15 ML: 1.2 RINSE ORAL at 08:03

## 2022-04-15 RX ADMIN — ENOXAPARIN SODIUM 30 MG: 30 INJECTION SUBCUTANEOUS at 14:06

## 2022-04-15 RX ADMIN — HYDROMORPHONE HYDROCHLORIDE 0.5 MG: 1 INJECTION, SOLUTION INTRAMUSCULAR; INTRAVENOUS; SUBCUTANEOUS at 09:03

## 2022-04-15 RX ADMIN — IPRATROPIUM BROMIDE AND ALBUTEROL SULFATE 3 ML: .5; 3 SOLUTION RESPIRATORY (INHALATION) at 18:26

## 2022-04-15 RX ADMIN — HYDROMORPHONE HYDROCHLORIDE 0.5 MG: 1 INJECTION, SOLUTION INTRAMUSCULAR; INTRAVENOUS; SUBCUTANEOUS at 00:43

## 2022-04-15 RX ADMIN — HUMAN ALBUMIN MICROSPHERES AND PERFLUTREN 9 ML: 10; .22 INJECTION, SOLUTION INTRAVENOUS at 11:16

## 2022-04-15 RX ADMIN — IPRATROPIUM BROMIDE AND ALBUTEROL SULFATE 3 ML: .5; 3 SOLUTION RESPIRATORY (INHALATION) at 11:24

## 2022-04-15 RX ADMIN — LORAZEPAM 2 MG: 2 INJECTION INTRAMUSCULAR; INTRAVENOUS at 05:24

## 2022-04-15 ASSESSMENT — ACTIVITIES OF DAILY LIVING (ADL)
ADLS_ACUITY_SCORE: 16

## 2022-04-15 NOTE — PROGRESS NOTES
AVSS on RA. Pain controlled with IV dilaudid. Skin WDL. LS clear. Diet NPO; OG in place. Up with assist of. BS hypo. Swan with adequate urine output.

## 2022-04-15 NOTE — PROGRESS NOTES
AVSS on Mech Vent. Pain controlled with PRN dilaudid IV. Skin CDI. LS dim throughout. Diet NPO; TF at goal. Bedrest maintained. BS active. Swan with adequate output.

## 2022-04-15 NOTE — PROGRESS NOTES
Critical Care Progress Note      04/15/2022    Name: Bita Stephenson MRN#: [id number]   Age: 71 year old YOB: 1950     \[facility] [place] [facility]\""tl Day# 1  ICU DAY #    MV DAY #             Problem List:   Active Problems:    Status epilepticus (H)    1. Status epilepticus - etiology still unclear and now seems resolved on Keppra. EEG is negative and case discussed with Neuro critical care. MRI consistent with age only.    2. Acute respiratory failure - for airway protection. On 25% and +5 PEEP and will move towards extubation later today.   3. HTN - continue routine meds and Norvasc restarted at initial half dose.   4. Former smoker- possible COPD on CXR and will treat duonebs at present. She has no history of COPD/asthma per se.   5. ID/possible aspiration pneumonia- no evidence at present and will not treat with antibiotics.   6. CKD - at about baseline creatinine 1.56, and likely current baseline.  Overall, acute and critical. I spent 40 minutes of critical care time with her today.           Summary/Hospital Course:           Assessment and plan :     Bita Stephenson IS a 71 year old female admitted on 4/14/2022 for acute respiratory failure and resolving status epilepticus.   I have personally reviewed the daily labs, imaging studies, cultures and discussed the case with referring physician and consulting physicians.     My assessment and plan by system for this patient is as follows:    Neurology/Psychiatry:   1. Sedated on vent but eeg negative, and will continue current medications at present    Cardiovascular:   1.Hemodynamics - well compensated    Pulmonary/Ventilator Management:   1. On minimal vent support at 25% and +5 PEEP; will wean towards extubation, hopefully today.     GI and Nutrition :   1. PO's if extubated; consider enteral if not.     Renal/Fluids/Electrolytes:   1. Appears to be at baseline with creatinine 1.56    Infectious Disease:   1. No issues at present     Endocrine:   1. Glucoses  ok    Hematology/Oncology:   1. Hb ok; counts ok     IV/Access:   1. Venous access -   2. Arterial access -   3.  Plan  - central access required and necessary      ICU Prophylaxis:   1. DVT: Hep Subq/ LMWH/mechanical  2. VAP: HOB 30 degrees, chlorhexidine rinse  3. Stress Ulcer: PPI/H2 blocker  4. Restraints: Nonviolent soft two point restraints required and necessary for patient safety and continued cares and good effect as patient continues to pull at necessary lines, tubes despite education and distraction. Will readdress daily.   5. IV Access - central access required and necessary for continued patient cares  6. Feeding -  PO's later when extubated  7. Family Update: deferred   8. Disposition - ICU care at present         Key goals for next 24 hours:   1. Move towards extubation   2. PO's later if tolerates extubated   3.               Interim History:              Key Medications:       chlorhexidine  15 mL Mouth/Throat Q12H     enoxaparin ANTICOAGULANT  30 mg Subcutaneous Q24H     famotidine  20 mg Intravenous Q24H     ipratropium - albuterol 0.5 mg/2.5 mg/3 mL  3 mL Nebulization Q4H     levETIRAcetam  500 mg Intravenous Q12H       propofol (DIPRIVAN) infusion 30 mcg/kg/min (04/15/22 0905)    And     - MEDICATION INSTRUCTIONS -       sodium chloride 75 mL/hr at 04/15/22 0803               Physical Examination:   Temp:  [98.2  F (36.8  C)-99.5  F (37.5  C)] 98.2  F (36.8  C)  Pulse:  [] 113  Resp:  [10-20] 17  BP: ()/() 135/82  FiO2 (%):  [25 %-40 %] 25 %  SpO2:  [68 %-100 %] 97 %    Intake/Output Summary (Last 24 hours) at 4/15/2022 1205  Last data filed at 4/15/2022 1000  Gross per 24 hour   Intake 1651.64 ml   Output 2100 ml   Net -448.36 ml     Wt Readings from Last 4 Encounters:   04/15/22 58.1 kg (128 lb 1.4 oz)   04/14/22 57.1 kg (125 lb 14.1 oz)   12/08/21 54.4 kg (120 lb)   02/01/18 64.4 kg (142 lb)     BP - Mean:  [] 103  Vent Mode: CMV/AC  (Continuous Mandatory Ventilation/  Assist Control)  FiO2 (%): 25 %  Resp Rate (Set): 14 breaths/min  Tidal Volume (Set, mL): 350 mL  PEEP (cm H2O): 5 cmH2O  Resp: 17    Recent Labs   Lab 04/15/22  0824 04/14/22  1624 04/14/22  0949   PH 7.33* 7.36 7.31*   PCO2 39 36 38   PO2 72* 105 84   HCO3 21 21 19*   O2PER 30 30 40       GEN: no acute distress; comfortable on vent   HEENT: head ncat, sclera anicteric, OP patent, trachea midline   PULM: unlabored synchronous with vent, clear anteriorly    CV/COR: RRR S1S2 no gallop,  No rub, no murmur  ABD: soft nontender, hypoactive bowel sounds, no mass  EXT:  Minimal edema   Warm; no cyanosis or mottling   NEURO: grossly intact; moves extremities to stimulation only; sedated  SKIN: no obvious rash; no cyanosis or mottling  LINES: clean, dry intact         Data:   All data and imaging reviewed     ROUTINE ICU LABS (Last four results)  CMP  Recent Labs   Lab 04/15/22  0745 04/15/22  0551 04/15/22  0232 04/14/22  1535 04/14/22  1504 04/14/22  0912   NA  --  142  --  138  --  143   POTASSIUM  --  3.6  --  4.2  --  4.1   CHLORIDE  --  115*  --  112*  --  111*   CO2  --  21  --  23  --  13*   ANIONGAP  --  6  --  3  --  19*   * 91 96 102*   < > 157*   BUN  --  24  --  32*  --  35*   CR  --  1.56*  --  1.52*  --  1.64*   GFRESTIMATED  --  35*  --  36*  --  33*   TROY  --  8.1*  --  8.2*  --  9.0   MAG  --   --   --  2.4*  --   --    PHOS  --  4.1  --  3.3  --   --    PROTTOTAL  --   --   --  6.6*  --  7.2   ALBUMIN  --  2.8*  --  3.1*  --  3.8   BILITOTAL  --   --   --  0.2  --  0.3   ALKPHOS  --   --   --  56  --  63   AST  --   --   --  17  --  17   ALT  --   --   --  18  --  <9    < > = values in this interval not displayed.     CBC  Recent Labs   Lab 04/15/22  0551 04/14/22  1535 04/14/22  0912   WBC 9.8 13.5* 13.6*   RBC 4.23 4.38 4.77   HGB 11.5* 11.8 12.8   HCT 37.0 38.1 41.2   MCV 88 87 86   MCH 27.2 26.9 26.8   MCHC 31.1* 31.0* 31.1*   RDW 15.6* 15.2* 15.3*    233 278     INR  Recent Labs   Lab  04/14/22  1535 04/14/22  0917   INR 0.98 0.96     Arterial Blood Gas  Recent Labs   Lab 04/15/22  0824 04/14/22  1624 04/14/22  0949   PH 7.33* 7.36 7.31*   PCO2 39 36 38   PO2 72* 105 84   HCO3 21 21 19*   O2PER 30 30 40       All cultures:  No results for input(s): CULT in the last 168 hours.  Recent Results (from the past 24 hour(s))   CTA Head Neck with Contrast    Narrative    EXAM: CTA  HEAD NECK WITH CONTRAST  LOCATION: [facility]  DATE/TIME: 4/14/2022 1:06 PM    INDICATION: Altered mental status. Seizure.  COMPARISON: CT head without contrast 04/14/2022.  CONTRAST: Isovue 370 75mL   TECHNIQUE: Head and neck CT angiogram with IV contrast. Noncontrast head CT followed by axial helical CT images of the head and neck vessels obtained during the arterial phase of intravenous contrast administration. Axial 2D reconstructed images and   multiplanar 3D MIP reconstructed images of the head and neck vessels were performed by the technologist. Dose reduction techniques were used. All stenosis measurements made according to NASCET criteria unless otherwise specified.    FINDINGS:   HEAD CTA:  ANTERIOR CIRCULATION: Mild atherosclerotic changes of the intracranial vasculature. No aneurysm or high flow vascular malformation. Fetal origin of both posterior cerebral arteries from the anterior circulation.    POSTERIOR CIRCULATION: Mild atherosclerotic changes of the intracranial vasculature. No aneurysm or high flow vascular malformation. Balanced vertebral arteries supply a normal basilar artery.     DURAL VENOUS SINUSES: Expected enhancement of the major dural venous sinuses.    NECK CTA:  RIGHT CAROTID: Atherosclerotic vascular calcifications of the right carotid bifurcation and proximal right ICA without measurable stenosis.    LEFT CAROTID: Minimal atherosclerotic vascular calcifications of the proximal left ICA without measurable stenosis based on NASCET criteria.    VERTEBRAL ARTERIES: No  focal stenosis or dissection. Balanced vertebral arteries.    AORTIC ARCH: Classic aortic arch anatomy with no significant stenosis at the origin of the great vessels.    NONVASCULAR STRUCTURES: Unremarkable.      Impression    IMPRESSION:   HEAD CTA:   1.  No significant stenosis, aneurysm, or high flow vascular malformation identified.  2.  Variant [place] of Castillo anatomy as above.    NECK CTA:  1.  No measurable stenosis of the bilateral internal carotid arteries based on NASCET criteria.   XR Chest Port 1 View    Narrative    XR CHEST PORT 1 VIEW 4/14/2022 4:30 PM    HISTORY: Endotracheal tube positioning    COMPARISON: None available      Impression    IMPRESSION: Endotracheal tube tip is 3.5 cm above the yumiko. Enteric  tube coursing below the left hemidiaphragm. Small right pleural  effusion and right basilar atelectasis. The left lung is clear. Normal  heart size. Tortuous aortic arch.    TRISTIAN MCDONALD MD         SYSTEM ID:  [id number]   MR Brain w/o & w Contrast    Narrative    EXAM: MR BRAIN W/O and W CONTRAST  LOCATION: [facility]  DATE/TIME: 4/14/2022 8:48 PM    INDICATION: Seizure, abnormal neuro exam  COMPARISON: CT 04/14/2022  CONTRAST: 6mL Gadavist  TECHNIQUE: 1.5 Carmela multiplanar multisequence head MRI without and with intravenous contrast according to the seizure protocol.    FINDINGS:  INTRACRANIAL CONTENTS: Dedicated imaging of the temporal lobes demonstrates symmetrical size and signal intensity of the mesial temporal structures including the hippocampus. No malformations of cortical development. No acute or subacute infarct. No   mass, acute hemorrhage, or extra-axial fluid collections. Patchy and confluent nonspecific T2/FLAIR hyperintensities within the cerebral white matter most consistent with moderate chronic microvascular ischemic change. Normal ventricles and sulci.    Normal position of the cerebellar tonsils. No pathologic contrast enhancement.    SELLA: No  abnormality accounting for technique.    OSSEOUS STRUCTURES/SOFT TISSUES: Normal marrow signal. The major intracranial vascular flow voids are maintained. Again seen is a 2 x 1.6 and lesion in the right paramedian alveolar ridge that is not enhancing and likely represents an odontogenic cyst.    ORBITS: No abnormality accounting for technique.     SINUSES/MASTOIDS: No paranasal sinus mucosal disease. No middle ear or mastoid effusion.       Impression    IMPRESSION:  1.  No epileptogenic focus identified.  2.  No acute intracranial process.  3.  Chronic deep white matter numerous patchy foci of FLAIR hyperintensity within the deep white matter presumed to be chronic microvascular ischemic change.   XR Abdomen Port 1 View    Narrative    EXAM: XR ABDOMEN PORT 1 VIEWS  LOCATION: [facility]  DATE/TIME: 4/15/2022 5:40 AM    INDICATION: For feeding tube placement  COMPARISON: None.      Impression    IMPRESSION: Enteric tube extending below level of the EG junction through the stomach with the tip in the distal stomach. Moderate amount of gas and stool in the colon. Vascular calcification. Linear scarring right lower lung.   Echocardiogram Complete   Result Value    LVEF  >70%    Narrative    [id number]  [id number]  [id number]^LORETA^SID^RAEGAN     [facility]  Echocardiography Laboratory  [address] [place] [address]     Name: VIOLETA LEAVITT  MRN: [id number]  : 1950  Study Date: 04/15/2022 10:49 AM  Age: 71 yrs  Gender: Female  Patient Location: [facility]  Reason For Study: Tachycardia  Ordering Physician: SID KAN  Referring Physician: Rebekah Cain  Performed By: Saniya Tirado     BSA: 1.6 m2  Height: 62 in  Weight: 128 lb  HR: 108  BP: 145/86 mmHg  ______________________________________________________________________________  Procedure  Complete Portable Echo Adult. Optison (NDC #[id number]) given  intravenously.  ______________________________________________________________________________  Interpretation Summary     Hyperdynamic left ventricular function  The visual ejection fraction is >70%.  No regional wall motion abnormalities noted.  The study was technically difficult. There is no comparison study available.  ______________________________________________________________________________  Left Ventricle  The left ventricle is normal in size. There is concentric remodeling present.  Hyperdynamic left ventricular function. The visual ejection fraction is >70%.  Left ventricular diastolic function is indeterminate. No regional wall motion  abnormalities noted.     Right Ventricle  The right ventricle is normal size. The right ventricular systolic function is  normal.     Atria  Normal left atrial size. Right atrial size is normal.     Mitral Valve  There is mild mitral annular calcification. There is trace mitral  regurgitation.     Tricuspid Valve  There is trace tricuspid regurgitation. Right ventricular systolic pressure  could not be approximated due to inadequate tricuspid regurgitation. IVC  diameter >2.1 cm collapsing <50% with sniff suggests a high RA pressure  estimated at 15 mmHg or greater.     Aortic Valve  The aortic valve is not well visualized. The aortic valve is trileaflet. No  aortic regurgitation is present. No aortic stenosis is present.     Pulmonic Valve  The pulmonic valve is not well visualized.     Vessels  The aortic root is normal size.     Pericardium  There is no pericardial effusion.     Rhythm  The rhythm was sinus tachycardia.  ______________________________________________________________________________  MMode/2D Measurements & Calculations  IVSd: 1.1 cm     LVIDd: 4.1 cm  LVIDs: 2.5 cm  LVPWd: 1.0 cm  FS: 38.0 %  LV mass(C)d: 139.3 grams  LV mass(C)dI: 88.1 grams/m2  Ao root diam: 3.7 cm  LA dimension: 3.0 cm  LA/Ao: 0.81  LA Volume (BP): 38.0 ml  LA Volume Index (BP):  24.1 ml/m2  RWT: 0.52  TAPSE: 2.4 cm     Doppler Measurements & Calculations  MV E max cristela: 73.6 cm/sec  MV A max cristela: 90.9 cm/sec  MV E/A: 0.81  MV dec time: 0.20 sec  E/E' avg: 10.5  Lateral E/e': 10.8  Medial E/e': 10.2     ______________________________________________________________________________  Report approved by: Damon Dick 04/15/2022 11:58 AM             MD Cody    Billing: This patient is critically ill: Yes. Total critical care time today 40 min.

## 2022-04-15 NOTE — CONSULTS
Neuroscience Intensive Care Consultation    REASON FOR CRITICAL CARE ADMISSION:       Date of Service:  04/15/2022  Date of Admission:  4/14/2022  Hospital Day: 2    Problem List  1.  Seizure, status epilepticus  2. Acute respiratory failure  3. HTN  4. Former smoker  5. COPD  6. CKD        Assessment/Plan  Bita Stephenson is a 71 year old admitted on 4/14/2022 with pmh htn. Presented to outside hospital and subsequently transferred to [facility] after 2 seizure events 4/14/22. Patient was noted with a decreased level of consciousness 4/14/22 and EMS was called. While in ED pt with witness generalized seizure. Patient was intubated for airway protection after receiving 2mg ativan IV and then started on propofol. On arrival patient was connected to [facility]. MRI brain unremarkable.     On patient's MRI, hypocampal atrophy noted and may be a focus for seizure.     Neuro  #Seizure, Altered mental status  -Neurochecks every 2 hrs  -HOB >30  -PT/OT/SLP    > vEEG  > Current AED   > Keppra 500mg BID    #Analgesics & sedation  -Per MICU      The patient was seen and discussed with the [facility] attending, Dr. Wick.    Tim Chaudhary MD JEROME      History of Present Illness:  Bita Stephenson is a 71 year old admitted on 4/14/2022 with pmh htn. Presented to outside hospital and subsequently transferred to [facility] after 2 seizure events 4/14/22. Patient was noted with a decreased level of consciousness 4/14/22 and EMS was called. While in ED pt with witness generalized seizure. Patient was intubated for airway protection after receiving 2mg ativan IV and then started on propofol. On arrival patient was connected to [facility]. MRI brain unremarkable.       Allergies   Allergen Reactions     Paroxetine Itching     tingling     Sulfa Drugs      Other reaction(s): *Unknown     Valium [Diazepam] Itching       PAST MEDICAL HISTORY:htn    PAST SURGICAL HISTORY:   Past Surgical History:   Procedure Laterality Date     NO PAST SURGERIES         FAMILY  HISTORY: I have reviewed this patient's family history and updated it with pertinent information if needed.  Family History   Problem Relation Age of Onset     Sudden Death Mother 62.00         at home, no autopsy     Heart Disease Mother         she had an unspecified type of heart surgery one month before her death     No Known Problems Son      No Known Problems Son      Diabetes Type 2  Son      No Known Problems Daughter      No Known Problems Daughter          SOCIAL HISTORY:   Social History     Tobacco Use     Smoking status: Former Smoker     Packs/day: 1.50     Years: 28.00     Pack years: 42.00     Types: Cigarettes, Cigarettes     Quit date: 2001     Years since quittin.2     Smokeless tobacco: Never Used   Substance Use Topics     Alcohol use: No     Comment: Alcoholic Drinks/day: quit drinking in 2017         ROS: Review of systems not obtained due to patient factors - mental status and intubation      Current Medications:    amLODIPine  5 mg Oral or Feeding Tube Daily     chlorhexidine  15 mL Mouth/Throat Q12H     enoxaparin ANTICOAGULANT  30 mg Subcutaneous Q24H     famotidine  20 mg Intravenous Q24H     ipratropium - albuterol 0.5 mg/2.5 mg/3 mL  3 mL Nebulization Q4H     levETIRAcetam  500 mg Intravenous Q12H       PRN Medications:  bisacodyl, glucose **OR** dextrose **OR** glucagon, HYDROmorphone, propofol (DIPRIVAN) infusion **AND** propofol **AND** CK total **AND** Triglycerides **AND** - MEDICATION INSTRUCTIONS - **AND** Notify Physician, naloxone **OR** naloxone **OR** naloxone **OR** naloxone, ondansetron **OR** ondansetron, polyethylene glycol, senna-docusate **OR** senna-docusate    Infusions:    propofol (DIPRIVAN) infusion Stopped (04/15/22 1248)    And     - MEDICATION INSTRUCTIONS -       sodium chloride 75 mL/hr at 04/15/22 0803       Physical Examination:  Vitals: BP (!) 147/85   Pulse 120   Temp 99.1  F (37.3  C) (Axillary)   Resp 11   Wt 58.1 kg (128 lb 1.4 oz)    SpO2 98%   BMI 23.05 kg/m      Vent Mode: CMV/AC  (Continuous Mandatory Ventilation/ Assist Control)  FiO2 (%): 25 %  Resp Rate (Set): 14 breaths/min  Tidal Volume (Set, mL): 350 mL  PEEP (cm H2O): 5 cmH2O  Resp: 11      I/O last 3 completed shifts:  In: 2049.16 [I.V.:1989.16; NG/GT:60]  Out: 2310 [Urine:2310]    Amrlene coma score:   GCS:   Motor 5=Localizes pain   Verbal 1=None   Eye Opening 1=None   Total: 7        FOUR SCORE   Eye response:       E0 = eyelids remain closed with pain.   Motor response:       M3 = localizing to pain     Brainstem reflexes:       B2 = pupil or corneal reflexes absent     Respiration pattern:       R1 = breathes above ventilatory rate         Neuro  Mental Status: Intubated and sedated    Cranial Nerves:     Pupil exam: R: 2mm L: 2mm   Reactivity: + +   Corneal reflex: absent absent   Grimace/Facial movement present    Cough present    Gag present         Motor Right Left   Upper Extremity Moves antigravity to and without stimulation Moves antigravity to and without stimulation   Lower Extremity Moves antigravity to and without stimulation Moves antigravity to and without stimulation   Sensory: Does Responds to painful stim Does Responds to painful stim   Coordination: Unable to obtain due to mental status Unable to obtain due to mental status   DTR: Deferred Deferred   Gait:       General  Lungs Intubated, equal chest rise   Heart Regular rate   Abdomen Non-distended   Extremities Well perfused   Skin No lesions noted          NIHSS  Interval     1a. Level of Consciousness     1b. LOC Questions     1c. LOC Commands     2.   Best Gaze     3.   Visual     4.   Facial Palsy     5a. Motor Arm, Left     5b. Motor Arm, Right     6a. Motor Leg, Left     6b. Motor Leg, right     7.   Limb Ataxia     8.   Sensory     9.   Best Language     10. Dysarthria     11. Extinction and Inattention      Total         ICH Score (at arrival)  Scoring Tool Score   Age ? 80 years 1 point     GCS  score  3-4   5-12   13-15   2 point  1 point  0 point     Hematoma volume, cm 3 ? 30 1 point     Intraventricular extension 1 point     Infratentorial location 1 point     Total         Hunt and Yates Scale (at arrival)  Grade             Labs:  Recent Labs   Lab 04/15/22  0551 04/14/22  1535 04/14/22  0912    138 143   POTASSIUM 3.6 4.2 4.1   CHLORIDE 115* 112* 111*   CO2 21 23 13*   BUN 24 32* 35*   CR 1.56* 1.52* 1.64*   TROY 8.1* 8.2* 9.0   BILITOTAL  --  0.2 0.3   ALKPHOS  --  56 63   ALT  --  18 <9   AST  --  17 17       Recent Labs   Lab 04/15/22  0551 04/14/22  1535 04/14/22  0912   WBC 9.8 13.5* 13.6*   HGB 11.5* 11.8 12.8    233 278       Recent Labs   Lab 04/15/22  0824 04/14/22  1624 04/14/22  0949   PH 7.33* 7.36 7.31*   PCO2 39 36 38   PO2 72* 105 84   HCO3 21 21 19*       All cultures:  Recent Labs   Lab 04/14/22  1015 04/14/22  1004   CULTURE No growth after 1 day No growth after 1 day       Imaging:  Results for orders placed or performed during the hospital encounter of 04/14/22   XR Chest Port 1 View    Impression    IMPRESSION: Endotracheal tube tip is 3.5 cm above the yuimko. Enteric  tube coursing below the left hemidiaphragm. Small right pleural  effusion and right basilar atelectasis. The left lung is clear. Normal  heart size. Tortuous aortic arch.    TRISTIAN MCDONALD MD         SYSTEM ID:  [id number]   MR Brain w/o & w Contrast    Impression    IMPRESSION:  1.  No epileptogenic focus identified.  2.  No acute intracranial process.  3.  Chronic deep white matter numerous patchy foci of FLAIR hyperintensity within the deep white matter presumed to be chronic microvascular ischemic change.   XR Abdomen Port 1 View    Impression    IMPRESSION: Enteric tube extending below level of the EG junction through the stomach with the tip in the distal stomach. Moderate amount of gas and stool in the colon. Vascular calcification. Linear scarring right lower lung.   Echocardiogram Complete    Result Value Ref Range    LVEF  >70%           All relevant imaging and laboratory values personally reviewed.    Clinically Significant Risk Factors Present on Admission

## 2022-04-15 NOTE — PROGRESS NOTES
[facility] ICU RESPIRATORY NOTE         Date of Admission: 4/14/2022     Date of Intubation (most recent): 4/14/22     Reason for Mechanical Ventilation: Airway protection     Number of Days on Mechanical Ventilation: 2     Met Criteria for Spontaneous Breathing Trial: No     Reason for No Spontaneous Breathing Trial: Per MD     Significant Events Today: Pt went to MRI.  Recent Labs   Lab 04/14/22  1624 04/14/22  0949   PH 7.36 7.31*   PCO2 36 38   PO2 105 84   HCO3 21 19*   O2PER 30 40   Vent Mode: CMV/AC  (Continuous Mandatory Ventilation/ Assist Control)  FiO2 (%): 30 %  Resp Rate (Set): 18 breaths/min  Tidal Volume (Set, mL): 400 mL  PEEP (cm H2O): 5 cmH2O  Resp: 14

## 2022-04-15 NOTE — PLAN OF CARE
Shift pt arrival to ICU-1900: VSS. Neuro: ELIF orientation, does not follow commands. Pulm: Lungs: clear/diminished throughout, mechanically ventilated. GI/: BS hypo, no BM; Swan in place: WDL output. Diet: NPO. Access: PIV's; propofol gtt, NS for MIVF. No pain per CPOT.    Evening shift:  - MRI ordered  - Ceribell ordered

## 2022-04-15 NOTE — PROGRESS NOTES
Intensivist brief update  DOS: 4/15/2022    Ms Stephenson self-extubated at around 1500.   On bedside exam, she is lethargic but awake and will follow commands.  Saturations are 96% on room air, no apparent distress, good respiratory effort and protecting her airway.  Continue to follow.    Erich Miner MD, PhD  Surgical critical care  April 15, 2022, 3:02 PM

## 2022-04-15 NOTE — PROVIDER NOTIFICATION
Md Mix contacted Regarding suspected seizure activity of bilateral Upper extremities. Very fine tremors visualized; some audible activity on Ceribell monitoring device. Received orders for 1x does of 2MG ativan.

## 2022-04-15 NOTE — PROGRESS NOTES
Discussed pt not meeting RASS goal with Dr. Yap. Pt more sedated than goal level. Ok to leave pt at sedation level given seizures. Will reassess/adjust after NCC rounds on pt.

## 2022-04-16 ENCOUNTER — APPOINTMENT (OUTPATIENT)
Dept: SPEECH THERAPY | Facility: CLINIC | Age: 72
DRG: 100 | End: 2022-04-16
Attending: INTERNAL MEDICINE
Payer: COMMERCIAL

## 2022-04-16 ENCOUNTER — HOSPITAL ENCOUNTER (INPATIENT)
Dept: NEUROLOGY | Facility: CLINIC | Age: 72
Discharge: HOME | DRG: 100 | End: 2022-04-16
Attending: INTERNAL MEDICINE | Admitting: INTERNAL MEDICINE
Payer: COMMERCIAL

## 2022-04-16 LAB
ANION GAP SERPL CALCULATED.3IONS-SCNC: 7 MMOL/L (ref 3–14)
BUN SERPL-MCNC: 17 MG/DL (ref 7–30)
CALCIUM SERPL-MCNC: 8.5 MG/DL (ref 8.5–10.1)
CHLORIDE BLD-SCNC: 115 MMOL/L (ref 94–109)
CO2 SERPL-SCNC: 20 MMOL/L (ref 20–32)
CREAT SERPL-MCNC: 1.15 MG/DL (ref 0.52–1.04)
GFR SERPL CREATININE-BSD FRML MDRD: 50 ML/MIN/1.73M2
GLUCOSE BLD-MCNC: 88 MG/DL (ref 70–99)
GLUCOSE BLDC GLUCOMTR-MCNC: 116 MG/DL (ref 70–99)
POTASSIUM BLD-SCNC: 3.4 MMOL/L (ref 3.4–5.3)
SARS-COV-2 RNA RESP QL NAA+PROBE: NEGATIVE
SODIUM SERPL-SCNC: 142 MMOL/L (ref 133–144)

## 2022-04-16 PROCEDURE — 99232 SBSQ HOSP IP/OBS MODERATE 35: CPT | Performed by: INTERNAL MEDICINE

## 2022-04-16 PROCEDURE — 99291 CRITICAL CARE FIRST HOUR: CPT | Mod: 25 | Performed by: STUDENT IN AN ORGANIZED HEALTH CARE EDUCATION/TRAINING PROGRAM

## 2022-04-16 PROCEDURE — 250N000011 HC RX IP 250 OP 636: Performed by: STUDENT IN AN ORGANIZED HEALTH CARE EDUCATION/TRAINING PROGRAM

## 2022-04-16 PROCEDURE — 94640 AIRWAY INHALATION TREATMENT: CPT

## 2022-04-16 PROCEDURE — 258N000002 HC RX IP 258 OP 250: Performed by: INTERNAL MEDICINE

## 2022-04-16 PROCEDURE — 92526 ORAL FUNCTION THERAPY: CPT | Mod: GN | Performed by: SPEECH-LANGUAGE PATHOLOGIST

## 2022-04-16 PROCEDURE — 95720 EEG PHY/QHP EA INCR W/VEEG: CPT | Performed by: PSYCHIATRY & NEUROLOGY

## 2022-04-16 PROCEDURE — 250N000009 HC RX 250: Performed by: INTERNAL MEDICINE

## 2022-04-16 PROCEDURE — 95714 VEEG EA 12-26 HR UNMNTR: CPT

## 2022-04-16 PROCEDURE — 250N000011 HC RX IP 250 OP 636: Performed by: INTERNAL MEDICINE

## 2022-04-16 PROCEDURE — U0003 INFECTIOUS AGENT DETECTION BY NUCLEIC ACID (DNA OR RNA); SEVERE ACUTE RESPIRATORY SYNDROME CORONAVIRUS 2 (SARS-COV-2) (CORONAVIRUS DISEASE [COVID-19]), AMPLIFIED PROBE TECHNIQUE, MAKING USE OF HIGH THROUGHPUT TECHNOLOGIES AS DESCRIBED BY CMS-2020-01-R: HCPCS | Performed by: INTERNAL MEDICINE

## 2022-04-16 PROCEDURE — 99223 1ST HOSP IP/OBS HIGH 75: CPT | Performed by: INTERNAL MEDICINE

## 2022-04-16 PROCEDURE — 120N000001 HC R&B MED SURG/OB

## 2022-04-16 PROCEDURE — 999N000157 HC STATISTIC RCP TIME EA 10 MIN

## 2022-04-16 PROCEDURE — 99207 EEG VIDEO 12-26 HR UNMONITORED: CPT | Performed by: PSYCHIATRY & NEUROLOGY

## 2022-04-16 PROCEDURE — 36415 COLL VENOUS BLD VENIPUNCTURE: CPT | Performed by: INTERNAL MEDICINE

## 2022-04-16 PROCEDURE — 92610 EVALUATE SWALLOWING FUNCTION: CPT | Mod: GN | Performed by: SPEECH-LANGUAGE PATHOLOGIST

## 2022-04-16 PROCEDURE — 94640 AIRWAY INHALATION TREATMENT: CPT | Mod: 76

## 2022-04-16 PROCEDURE — 258N000003 HC RX IP 258 OP 636: Performed by: INTERNAL MEDICINE

## 2022-04-16 PROCEDURE — 80048 BASIC METABOLIC PNL TOTAL CA: CPT | Performed by: INTERNAL MEDICINE

## 2022-04-16 PROCEDURE — 250N000013 HC RX MED GY IP 250 OP 250 PS 637: Performed by: INTERNAL MEDICINE

## 2022-04-16 PROCEDURE — 99207 PR CONSULT E&M CHANGED TO INITIAL LEVEL: CPT | Performed by: INTERNAL MEDICINE

## 2022-04-16 RX ORDER — ACETAMINOPHEN 325 MG/1
650 TABLET ORAL EVERY 4 HOURS PRN
Status: DISCONTINUED | OUTPATIENT
Start: 2022-04-16 | End: 2022-04-17 | Stop reason: HOSPADM

## 2022-04-16 RX ORDER — LORAZEPAM 2 MG/ML
2 INJECTION INTRAMUSCULAR EVERY 5 MIN PRN
Status: DISCONTINUED | OUTPATIENT
Start: 2022-04-16 | End: 2022-04-17 | Stop reason: HOSPADM

## 2022-04-16 RX ORDER — LIDOCAINE 40 MG/G
CREAM TOPICAL
Status: DISCONTINUED | OUTPATIENT
Start: 2022-04-16 | End: 2022-04-17 | Stop reason: HOSPADM

## 2022-04-16 RX ORDER — AMLODIPINE BESYLATE 10 MG/1
10 TABLET ORAL DAILY
Status: DISCONTINUED | OUTPATIENT
Start: 2022-04-16 | End: 2022-04-17 | Stop reason: HOSPADM

## 2022-04-16 RX ORDER — SODIUM CHLORIDE 450 MG/100ML
INJECTION, SOLUTION INTRAVENOUS CONTINUOUS
Status: DISCONTINUED | OUTPATIENT
Start: 2022-04-16 | End: 2022-04-16

## 2022-04-16 RX ADMIN — ENOXAPARIN SODIUM 30 MG: 30 INJECTION SUBCUTANEOUS at 14:49

## 2022-04-16 RX ADMIN — SODIUM CHLORIDE: 4.5 INJECTION, SOLUTION INTRAVENOUS at 10:30

## 2022-04-16 RX ADMIN — FAMOTIDINE 20 MG: 10 INJECTION, SOLUTION INTRAVENOUS at 17:57

## 2022-04-16 RX ADMIN — AMLODIPINE BESYLATE 10 MG: 10 TABLET ORAL at 11:53

## 2022-04-16 RX ADMIN — SODIUM CHLORIDE: 9 INJECTION, SOLUTION INTRAVENOUS at 00:23

## 2022-04-16 RX ADMIN — LEVETIRACETAM 500 MG: 5 INJECTION INTRAVENOUS at 04:22

## 2022-04-16 RX ADMIN — IPRATROPIUM BROMIDE AND ALBUTEROL SULFATE 3 ML: .5; 3 SOLUTION RESPIRATORY (INHALATION) at 07:58

## 2022-04-16 RX ADMIN — IPRATROPIUM BROMIDE AND ALBUTEROL SULFATE 3 ML: .5; 3 SOLUTION RESPIRATORY (INHALATION) at 03:09

## 2022-04-16 RX ADMIN — LEVETIRACETAM 500 MG: 5 INJECTION INTRAVENOUS at 18:09

## 2022-04-16 ASSESSMENT — ACTIVITIES OF DAILY LIVING (ADL)
ADLS_ACUITY_SCORE: 16
ADLS_ACUITY_SCORE: 16
DOING_ERRANDS_INDEPENDENTLY_DIFFICULTY: NO
CHANGE_IN_FUNCTIONAL_STATUS_SINCE_ONSET_OF_CURRENT_ILLNESS/INJURY: YES
ADLS_ACUITY_SCORE: 16
CONCENTRATING,_REMEMBERING_OR_MAKING_DECISIONS_DIFFICULTY: NO
ADLS_ACUITY_SCORE: 16
DRESSING/BATHING_DIFFICULTY: NO
ADLS_ACUITY_SCORE: 9
ADLS_ACUITY_SCORE: 18
FALL_HISTORY_WITHIN_LAST_SIX_MONTHS: NO
ADLS_ACUITY_SCORE: 18
DIFFICULTY_EATING/SWALLOWING: NO
ADLS_ACUITY_SCORE: 16
WALKING_OR_CLIMBING_STAIRS_DIFFICULTY: NO
TOILETING_ISSUES: NO
ADLS_ACUITY_SCORE: 16
ADLS_ACUITY_SCORE: 9
ADLS_ACUITY_SCORE: 16
ADLS_ACUITY_SCORE: 9
WEAR_GLASSES_OR_BLIND: NO
ADLS_ACUITY_SCORE: 9
ADLS_ACUITY_SCORE: 18
ADLS_ACUITY_SCORE: 9
ADLS_ACUITY_SCORE: 16

## 2022-04-16 NOTE — CONSULTS
Neuroscience Intensive Care Consultation    REASON FOR CRITICAL CARE ADMISSION:       Date of Service:  04/16/2022  Date of Admission:  4/14/2022  Hospital Day: 3    Problem List  1.  Seizure, status epilepticus  2. Acute respiratory failure  3. HTN  4. Former smoker  5. COPD  6. CKD    Interval Events  No acute events overnight       Assessment/Plan  Bita Stephenson is a 71 year old admitted on 4/14/2022 with pmh htn. Presented to outside hospital and subsequently transferred to [facility] after 2 seizure events 4/14/22. Patient was noted with a decreased level of consciousness 4/14/22 and EMS was called. While in ED pt with witness generalized seizure. Patient was intubated for airway protection after receiving 2mg ativan IV and then started on propofol. On arrival patient was connected to [facility]. MRI brain unremarkable.     On patient's MRI, hypocampal atrophy noted and may be a focus for seizure.     Neuro  #Seizure, Altered mental status  -Neurochecks every 4 hrs  -HOB >30  -PT/OT/SLP    > discontinue vEEG  > Current AED   > Keppra 500mg BID  > General neurology to follow out of ICU  > Pt clear to leave ICU    #Analgesics & sedation  -Per MICU      The patient was seen and discussed with the NCC attending, Dr. Wick.    Thank you for this consult. Please page with any further questions and to re-engage.   If patient leaves ICU, please consult general neurology to follow        Tim Chaudhary MD JEROME      History of Present Illness:  Bita Stephenson is a 71 year old admitted on 4/14/2022 with pmh htn. Presented to outside hospital and subsequently transferred to [facility] after 2 seizure events 4/14/22. Patient was noted with a decreased level of consciousness 4/14/22 and EMS was called. While in ED pt with witness generalized seizure. Patient was intubated for airway protection after receiving 2mg ativan IV and then started on propofol. On arrival patient was connected to [facility]. MRI brain unremarkable.       Allergies    Allergen Reactions     Paroxetine Itching     tingling     Sulfa Drugs      Other reaction(s): *Unknown     Valium [Diazepam] Itching       PAST MEDICAL HISTORY:htn    PAST SURGICAL HISTORY:   Past Surgical History:   Procedure Laterality Date     NO PAST SURGERIES         FAMILY HISTORY:   I have reviewed this patient's family history and updated it with pertinent information if needed.  Family History   Problem Relation Age of Onset     Sudden Death Mother 62.00         at home, no autopsy     Heart Disease Mother         she had an unspecified type of heart surgery one month before her death     No Known Problems Son      No Known Problems Son      Diabetes Type 2  Son      No Known Problems Daughter      No Known Problems Daughter          SOCIAL HISTORY:   Social History     Tobacco Use     Smoking status: Former Smoker     Packs/day: 1.50     Years: 28.00     Pack years: 42.00     Types: Cigarettes, Cigarettes     Quit date: 2001     Years since quittin.2     Smokeless tobacco: Never Used   Substance Use Topics     Alcohol use: No     Comment: Alcoholic Drinks/day: quit drinking in 2017         ROS: Review of systems not obtained due to patient factors - mental status and intubation      Current Medications:    amLODIPine  5 mg Oral or Feeding Tube Daily     enoxaparin ANTICOAGULANT  30 mg Subcutaneous Q24H     famotidine  20 mg Intravenous Q24H     ipratropium - albuterol 0.5 mg/2.5 mg/3 mL  3 mL Nebulization Q4H     levETIRAcetam  500 mg Intravenous Q12H       PRN Medications:  bisacodyl, glucose **OR** dextrose **OR** glucagon, HYDROmorphone, naloxone **OR** naloxone **OR** naloxone **OR** naloxone, ondansetron **OR** ondansetron, polyethylene glycol, senna-docusate **OR** senna-docusate    Infusions:    sodium chloride 75 mL/hr at 22 0023       Physical Examination:  Vitals: BP (!) 156/96 (BP Location: Right arm)   Pulse 99   Temp 99.2  F (37.3  C) (Axillary)   Resp 16   Wt 55.2 kg  (121 lb 11.1 oz)   SpO2 97%   BMI 21.90 kg/m      Vent Mode: PS  (Pressure Support)  FiO2 (%): 40 %  Resp Rate (Set): 14 breaths/min  Tidal Volume (Set, mL): 350 mL  PEEP (cm H2O): 5 cmH2O  Pressure Support (cm H2O): 5 cmH2O  Resp: 16      I/O last 3 completed shifts:  In: 2021.09 [I.V.:1961.09; NG/GT:60]  Out: 1960 [Urine:1960]    General Exam  General:  patient lying in bed without any acute distress    HEENT:  normocephalic/atraumatic  Cardio:  regular rate  Pulmonary:  no respiratory distress  Abdomen:  non-distended  Extremities:  no edema  Skin:  intact     Neuro Exam  Mental Status:  follows commands  Cranial Nerves:  PERRL, EOMI with normal smooth pursuit, facial movements symmetric, hearing not formally tested but intact to conversation, no dysarthria  Motor:  normal muscle tone and bulk, no abnormal movements, able to move all limbs spontaneously  Sensory:  light touch sensation intact and symmetric throughout upper and lower extremities  Coordination:  no ataxia or tremor observed  Station/Gait:  deferred        Labs:  Recent Labs   Lab 04/15/22  0551 04/14/22  1535 04/14/22  0912    138 143   POTASSIUM 3.6 4.2 4.1   CHLORIDE 115* 112* 111*   CO2 21 23 13*   BUN 24 32* 35*   CR 1.56* 1.52* 1.64*   TROY 8.1* 8.2* 9.0   BILITOTAL  --  0.2 0.3   ALKPHOS  --  56 63   ALT  --  18 <9   AST  --  17 17       Recent Labs   Lab 04/15/22  0551 04/14/22  1535 04/14/22  0912   WBC 9.8 13.5* 13.6*   HGB 11.5* 11.8 12.8    233 278       Recent Labs   Lab 04/15/22  0824 04/14/22  1624 04/14/22  0949   PH 7.33* 7.36 7.31*   PCO2 39 36 38   PO2 72* 105 84   HCO3 21 21 19*       All cultures:  Recent Labs   Lab 04/14/22  1015 04/14/22  1004   CULTURE No growth after 1 day No growth after 1 day       Imaging:  Results for orders placed or performed during the hospital encounter of 04/14/22   XR Chest Port 1 View    Impression    IMPRESSION: Endotracheal tube tip is 3.5 cm above the yumiko. Enteric  tube  coursing below the left hemidiaphragm. Small right pleural  effusion and right basilar atelectasis. The left lung is clear. Normal  heart size. Tortuous aortic arch.    TRISTIAN MCDONALD MD         SYSTEM ID:  [id number]   MR Brain w/o & w Contrast    Impression    IMPRESSION:  1.  No epileptogenic focus identified.  2.  No acute intracranial process.  3.  Chronic deep white matter numerous patchy foci of FLAIR hyperintensity within the deep white matter presumed to be chronic microvascular ischemic change.   XR Abdomen Port 1 View    Impression    IMPRESSION: Enteric tube extending below level of the EG junction through the stomach with the tip in the distal stomach. Moderate amount of gas and stool in the colon. Vascular calcification. Linear scarring right lower lung.   Echocardiogram Complete   Result Value Ref Range    LVEF  >70%           All relevant imaging and laboratory values personally reviewed.    Clinically Significant Risk Factors Present on Admission

## 2022-04-16 NOTE — PLAN OF CARE
Shift Summary  Assumed cares from [id number] when patient transferred to 7th floor.     Neuro: A/O x 4, able to make needs known via call light. Pt sleeps between cares and arouses to voice.     Cardiac: SR to ST. SBP goal <170..     Pulmonary: LSC, on room air. Pt has an intermittent non-productive cough.     GI: Hypoactive bowel sounds, no BM this shift. Speech evaluated pt today and ordered a diet. Pt does well with eating and drinking thin liquids.     : Purewick in place, >400 out every 4 hours.     Integumentary: WDL. Pt changes positions independently.     Restraints: Not needed.     Activity: Bedrest. PT/OT to eval pt.     Plan of care has been explained to patient: Yes.     Alley Velásquez RN

## 2022-04-16 NOTE — PROGRESS NOTES
VEEG monitoring preliminary results:    VEEG has been running for over one hour.  EEG showed severe generalized slowing with superimposed beta activities under sedation.  Findings are consistent with sever diffuse encephalopathy.  No epileptiform activities, no clinical or electrographic seizures were observed.    Chadd Donahue MD  Neurology

## 2022-04-16 NOTE — CONSULTS
[facility]  Consult Note - Hospitalist Service  Date of Admission:  4/14/2022  Consult Requested by: ICU team  Reason for Consult: transfer of care    Assessment & Plan   Bita Stephenson is a 71 year old female admitted on 4/14/2022. She has h/o HTN, CKD stage 3 and UTI. She was brought in to [facility] ER for evaluation of confusion. She had a witnessed generalized tonic-clonic seizure in the ED, lasted about 4 minutes before was broken with 2 mg IV Ativan. She was intubated for airway protection in ER and transferred to [facility] ICU. She was started on Keppra and followed by NCC and ICU team. She self extubated yesterday, 4/15, now doing well on RA. Hospitalist service was called regarding the transfer of care.    Status epilepticus  Altered mental status due to above  - she was brought in to [facility] ER for evaluation of confusion  - she had a witnessed generalized tonic-clonic seizure in the ED, lasted about 4 minutes before was broken with 2 mg IV Ativan.   - no prior h/o seizure  - smokes marijuana but does not uses other drugs  - She was intubated for airway protection in ER and transferred to [facility] ICU.   * CT head- no acute pathology; presumed chronic hypertensive/microvascular ischemic white matter changes;   * CTA head and neck negative;  * MRI brain- cerebral volume loss and hippocampal atrophy.   - followed by NCC   - loaded with Keppra 1000 mg ivX1 followed by Keppra 500 mg iv BID  - vEEG ordered.   - She self- extubated yesterday 4/15; currently doing well on RA, awake, alert and oriented to self, place and partially to place.  - OK to transfer out of ICU  - will consult General Neurology for further recommendations  - SZD/px  - SLP- soft and bite sized diet with thin liquids  - PT/OT    Sepsis, ruled out  - there was initial concern for sepsis, LA 8.5 in ER (likely die to seizures), WBCs 13.5  - Zosyn given in ER  - UA- negative, CXR- no infiltrates  - LA improved to 1.4, WBCs  9.8  - ABX not continued after admission    HTN  [PTA on Norvasc 10 mg po daily]  - initially resumed PTA Norvasc at lower dose 5 mg po daily  - BP on higher side, increase Norvasc to 10 mg po daily  - monitor BP    CKD stage 3  - baseline cr 1.2--1.5  - cr here 1.52--1.56--1.15  - will stop iv fluids    DVT/Px- Lovenox  CODE STATUS: Full Code     The patient's care was discussed with the Bedside Nurse, Patient and ICU Team.    Xiomara Gonzales MD  [facility]  Securely message with the Vocera Web Console (learn more here)  Text page via [facility] Paging/Sense Platform[employer]       Hospitalist Service    Clinically Significant Risk Factors Present on Admission                 ______________________________________________________________________    Chief Complaint   Altered mental status  Status epilepticus    History is obtained from the patient, electronic health record and ICU team.    History of Present Illness   Bita Stephenson is a 71 year old female admitted on 4/14/2022. She has h/o HTN, CKD stage 3 and UTI. She was brought in to [facility] ER for evaluation of confusion. She had a witnessed generalized tonic-clonic seizure in the ED, lasted about 4 minutes before was broken with 2 mg IV Ativan. She was intubated for airway protection in ER and transferred to [facility] ICU. No prior h/o seizures; CT head- no acute pathology; presumed chronic hypertensive/microvascular ischemic white matter changes; CTA head and neck negative; MRI brain- cerebral volume loss and hippocampal atrophy. NCC consulted- loaded with Keppra and vEEG ordered. She self- extubated yesterday 4/15; currently doing well on RA, awake, alert and oriented to self, place and partially to place.   She denies chest pain, no SOB, no headache, no N/V, no abd pain; she was seen by SLP and started on soft and bite sized diet with thin liquids.    Review of Systems   The 10 point Review of Systems is negative other than noted in the HPI or here.  "    Past Medical History    I have reviewed this patient's medical history and updated it with pertinent information if needed.     HTN  CKD stage 3  Licken sclerosus    Past Surgical History   I have reviewed this patient's surgical history and updated it with pertinent information if needed.  Past Surgical History:   Procedure Laterality Date     NO PAST SURGERIES         Social History   I have reviewed this patient's social history and updated it with pertinent information if needed.  Social History     Tobacco Use     Smoking status: Former Smoker     Packs/day: 1.50     Years: 28.00     Pack years: 42.00     Types: Cigarettes, Cigarettes     Quit date: 2001     Years since quittin.2     Smokeless tobacco: Never Used   Substance Use Topics     Alcohol use: No     Comment: Alcoholic Drinks/day: quit drinking in 2017     Drug use: Yes     Types: Marijuana     Comment: Drug use: \"occasional\"       Family History   I have reviewed this patient's family history and updated it with pertinent information if needed.  Family History   Problem Relation Age of Onset     Sudden Death Mother 62.00         at home, no autopsy     Heart Disease Mother         she had an unspecified type of heart surgery one month before her death     No Known Problems Son      No Known Problems Son      Diabetes Type 2  Son      No Known Problems Daughter      No Known Problems Daughter        Medications   Current Facility-Administered Medications   Medication     amLODIPine (NORVASC) tablet 10 mg     bisacodyl (DULCOLAX) Suppository 10 mg     glucose gel 15-30 g    Or     dextrose 50 % injection 25-50 mL    Or     glucagon injection 1 mg     enoxaparin ANTICOAGULANT (LOVENOX) injection 30 mg     famotidine (PEPCID) injection 20 mg     levETIRAcetam (KEPPRA) intermittent infusion 500 mg     melatonin tablet 5 mg     naloxone (NARCAN) injection 0.2 mg    Or     naloxone (NARCAN) injection 0.4 mg    Or     naloxone (NARCAN) " injection 0.2 mg    Or     naloxone (NARCAN) injection 0.4 mg     ondansetron (ZOFRAN-ODT) ODT tab 4 mg    Or     ondansetron (ZOFRAN) injection 4 mg     polyethylene glycol (MIRALAX) Packet 17 g     senna-docusate (SENOKOT-S/PERICOLACE) 8.6-50 MG per tablet 1 tablet    Or     senna-docusate (SENOKOT-S/PERICOLACE) 8.6-50 MG per tablet 2 tablet     sodium chloride 0.45% infusion       Allergies   Allergies   Allergen Reactions     Paroxetine Itching     tingling     Sulfa Drugs      Other reaction(s): *Unknown     Valium [Diazepam] Itching       Physical Exam   Vital Signs: Temp: 99.2  F (37.3  C) Temp src: Axillary BP: (!) 156/96 Pulse: 99   Resp: 16 SpO2: 97 % O2 Device: None (Room air) Oxygen Delivery: 10 LPM  Weight: 121 lbs 11.1 oz    GEN: awake, alert, no acute distress, a little sleepy  HEENT: normocephalic, atraumatic, PERRL  PULM: bilateral air entry, no wheezing, no rales, no crackles  CV/COR: RRR S1S2 no gallop,  No rub, no murmur  ABD: soft, nontender, BSpresent  EXT: no edema, no calf tenderness  NEURO: awake and alert, oriented to self, place and partially to time (2022), no FNDs  SKIN: no obvious rash; no cyanosis or mottling      Data   I personally reviewed the head CT image(s) showing no acute pathology and the brain MRI image(s) showing cerebral volume loss and hippocampal atrophy.    Results for orders placed or performed during the hospital encounter of 22 (from the past 24 hour(s))   Echocardiogram Complete   Result Value Ref Range    LVEF  >70%     Narrative    [id number]  [id number]  [id number]  [id number]^LORETA^SID^RAEGAN     [facility]  Echocardiography Laboratory  [address] [place] [id number]     Name: VIOLETA LEAVITT  MRN: [id number]  : 1950  Study Date: 04/15/2022 10:49 AM  Age: 71 yrs  Gender: Female  Patient Location: [facility]  Reason For Study: Tachycardia  Ordering Physician: SID KAN  Referring Physician: Rebekah Cain  N[place]u  Performed By: Saniya Tirado     BSA: 1.6 m2  Height: 62 in  Weight: 128 lb  HR: 108  BP: 145/86 mmHg  ______________________________________________________________________________  Procedure  Complete Portable Echo Adult. Optison (NDC #5963-1933) given intravenously.  ______________________________________________________________________________  Interpretation Summary     Hyperdynamic left ventricular function  The visual ejection fraction is >70%.  No regional wall motion abnormalities noted.  The study was technically difficult. There is no comparison study available.  ______________________________________________________________________________  Left Ventricle  The left ventricle is normal in size. There is concentric remodeling present.  Hyperdynamic left ventricular function. The visual ejection fraction is >70%.  Left ventricular diastolic function is indeterminate. No regional wall motion  abnormalities noted.     Right Ventricle  The right ventricle is normal size. The right ventricular systolic function is  normal.     Atria  Normal left atrial size. Right atrial size is normal.     Mitral Valve  There is mild mitral annular calcification. There is trace mitral  regurgitation.     Tricuspid Valve  There is trace tricuspid regurgitation. Right ventricular systolic pressure  could not be approximated due to inadequate tricuspid regurgitation. IVC  diameter >2.1 cm collapsing <50% with sniff suggests a high RA pressure  estimated at 15 mmHg or greater.     Aortic Valve  The aortic valve is not well visualized. The aortic valve is trileaflet. No  aortic regurgitation is present. No aortic stenosis is present.     Pulmonic Valve  The pulmonic valve is not well visualized.     Vessels  The aortic root is normal size.     Pericardium  There is no pericardial effusion.     Rhythm  The rhythm was sinus tachycardia.  ______________________________________________________________________________  MMode/2D  Measurements & Calculations  IVSd: 1.1 cm     LVIDd: 4.1 cm  LVIDs: 2.5 cm  LVPWd: 1.0 cm  FS: 38.0 %  LV mass(C)d: 139.3 grams  LV mass(C)dI: 88.1 grams/m2  Ao root diam: 3.7 cm  LA dimension: 3.0 cm  LA/Ao: 0.81  LA Volume (BP): 38.0 ml  LA Volume Index (BP): 24.1 ml/m2  RWT: 0.52  TAPSE: 2.4 cm     Doppler Measurements & Calculations  MV E max cristela: 73.6 cm/sec  MV A max cristela: 90.9 cm/sec  MV E/A: 0.81  MV dec time: 0.20 sec  E/E' avg: 10.5  Lateral E/e': 10.8  Medial E/e': 10.2     ______________________________________________________________________________  Report approved by: Damon Dick 04/15/2022 11:58 AM         Glucose by meter   Result Value Ref Range    GLUCOSE BY METER POCT 115 (H) 70 - 99 mg/dL   Glucose by meter   Result Value Ref Range    GLUCOSE BY METER POCT 116 (H) 70 - 99 mg/dL   Glucose by meter   Result Value Ref Range    GLUCOSE BY METER POCT 106 (H) 70 - 99 mg/dL   Glucose by meter   Result Value Ref Range    GLUCOSE BY METER POCT 116 (H) 70 - 99 mg/dL   Basic metabolic panel   Result Value Ref Range    Sodium 142 133 - 144 mmol/L    Potassium 3.4 3.4 - 5.3 mmol/L    Chloride 115 (H) 94 - 109 mmol/L    Carbon Dioxide (CO2)      Anion Gap      Urea Nitrogen      Creatinine      Calcium      Glucose      GFR Estimate

## 2022-04-16 NOTE — PROVIDER NOTIFICATION
Date paged: 4/16    Time paged: [phone]    Person paged: Syncing.Neting system used: [employer]    Message: Patient transferred from ICU, EEG probes still in place but not machine. I see that in the transfer orders, EEG is continued but in NeuroCrit's note they said DC vEEG. Do we want to continue EEG or DC them? Thanks, Mary MARTINEZ *[phone]    Response: EEG discontinued per Neurocritical and hospitalist, unable to edit orders at this time.

## 2022-04-16 NOTE — PROGRESS NOTES
04/16/22 1125   General Information   Onset of Illness/Injury or Date of Surgery 04/14/22   Referring Physician Dr. Pinzon   Patient/Family Therapy Goal Statement (SLP) Patient did not state.   Pertinent History of Current Problem Per MD note; 70 yo woman admitted on 4/14 for acute respiratory failure and status epilepticus. Seizures resolved. No specific cause found. Patient self extubated on 4/15 and stable since.   General Observations Alert but fatigues easily.   Past History of Dysphagia No documented history.   Type of Evaluation   Type of Evaluation Swallow Evaluation   Oral Motor   Oral Musculature generally intact   Structural Abnormalities none present   Mucosal Quality dry   Dentition (Oral Motor)   Dentition (Oral Motor) adequate dentition;natural dentition   Facial Symmetry (Oral Motor)   Facial Symmetry (Oral Motor) WNL   Lip Function (Oral Motor)   Lip Range of Motion (Oral Motor) WNL   Tongue Function (Oral Motor)   Tongue ROM (Oral Motor) WNL   Tongue Coordination/Speed (Oral Motor) reduced rate   Comment, Tongue Function (Oral Motor) ROM is adequate.   Jaw Function (Oral Motor)   Jaw Function (Oral Motor) WNL   Cough/Swallow/Gag Reflex (Oral Motor)   Soft Palate/Velum (Oral Motor) WNL   Volitional Throat Clear/Cough (Oral Motor) impaired;reduced strength   Volitional Swallow (Oral Motor) WNL   Vocal Quality/Secretion Management (Oral Motor)   Vocal Quality (Oral Motor) hoarse   General Swallowing Observations   Respiratory Support (General Swallowing Observations) none   Current Diet/Method of Nutritional Intake (General Swallowing Observations, NIS) NPO   Swallowing Evaluation Clinical swallow evaluation   Clinical Swallow Evaluation   Feeding Assistance frequent cues/help required   Clinical Swallow Evaluation Textures Trialed thin liquids;pureed;solid foods   Clinical Swallow Eval: Thin Liquid Texture Trial   Mode of Presentation, Thin Liquids cup;self-fed;spoon;fed by clinician   Volume of  Liquid or Food Presented 8 oz of water   Oral Phase of Swallow premature pharyngeal entry   Pharyngeal Phase of Swallow intact   Diagnostic Statement Premature spillage but tolerated single and consecutive swallows of water via the cup. No vocal cahnges or other Sx of aspiration.   Clinical Swallow Evaluation: Puree Solid Texture Trial   Mode of Presentation, Puree spoon;fed by clinician   Volume of Puree Presented 5 teaspoons of apple sauce   Oral Phase, Puree WFL   Pharyngeal Phase, Puree intact   Clinical Swallow Evaluation: Solid Food Texture Trial   Mode of Presentation self-fed   Volume Presented 1/2 dominique cracker   Oral Phase impaired mastication;delayed AP movement;residue in oral cavity   Oral Residue mid posterior tongue   Pharyngeal Phase impaired;reduction in laryngeal movement;repeated swallows   Diagnostic Statement Prolonged mastication with decreased AP movement of the bolus with mild oral residue.   Swallowing Recommendations   Diet Consistency Recommendations soft & bite-sized (level 6);thin liquids (level 0)   Supervision Level for Intake close supervision needed   Mode of Delivery Recommendations bolus size, small;no straws;slow rate of intake   Postural Recommendations none   Swallowing Maneuver Recommendations alternate food and liquid intake;extra swallow   Monitoring/Assistance Required (Eating/Swallowing) check mouth frequently for oral residue/pocketing;stop eating activities when fatigue is present;monitor for cough or change in vocal quality with intake   Recommended Feeding/Eating Techniques (Swallow Eval) maintain upright sitting position for eating;maintain upright posture during/after eating for 30 minutes;minimize distractions during oral intake;set-up and prepare tray   Medication Administration Recommendations, Swallowing (SLP) As tolerated.   General Therapy Interventions   Planned Therapy Interventions Dysphagia Treatment   Dysphagia treatment Modified diet education;Instruction  of safe swallow strategies   Clinical Impression   Criteria for Skilled Therapeutic Interventions Met (SLP Eval) Yes, treatment indicated   SLP Diagnosis Mild oral and pharyngeal dysphagia   Risks & Benefits of therapy have been explained evaluation/treatment results reviewed;care plan/treatment goals reviewed;risks/benefits reviewed;current/potential barriers reviewed;participants voiced agreement with care plan;participants included;patient   Clinical Impression Comments Patient presents with mild oral and pharyngeal dysphagia at bedside secondary to intubation/self extubation. Patient was alert but fatigued easily. She demonstrated premature entry of thin liquids without Sx of of aspiration across 8 oz of water via the cup on single and consecutive swallows. Pureed textures were WFL.  Mastication was mildly prolonged with decreased bolus control during AP movement resulting in mild oral residue on mid tongue. This was cleared with a liquid rinse. Barriers to safe swallowing is fatigue/alertness. Recommend: 1. IDDSI level 6 soft/bite size with thin liquids. 2. Upright, small bites/sips, no straws, check for oral residue and alternate liquids/solids. Hold diet if overt Sx of aspiration present or changes in her respiratory status.   SLP Discharge Planning   SLP Discharge Recommendation Transitional Care Facility;home with home care speech therapy   SLP Rationale for DC Rec Patient's swallow function is belwo her baseline. Pending PT evaluation maybe appropriate for acute rehab if she has needs.   SLP Brief overview of current status  Mild oral and pharyngeal dysphagia secondary to respiatry failure and intubation.    Total Evaluation Time   Total Evaluation Time (Minutes) 15   SLP Goals   Therapy Frequency (SLP Eval) 5 times/wk   SLP Predicted Duration/Target Date for Goal Attainment 04/23/22   SLP Goals Swallow   SLP: Safely tolerate diet without signs/symptoms of aspiration Regular diet;Thin liquids;With use of  swallow precautions;Independently

## 2022-04-16 NOTE — CONSULTS
[facility]    Neurology Consultation     Date of Admission:  4/14/2022    Assessment & Plan   Bita Stephenson is a 71 year old female who was admitted on 4/14/2022. I was asked to see the patient for seizure.    Risk- unclear - possible dehydration    - On keppra 500BID  - EEG VIDEO pending   - MRI 4/14/22- No epileptogenic focus identified-chronic microvascular ischemic change  - CTH- Chronic lacunae in the left thalamus.   - CTA H/N- No significant stenosis, aneurysm, or high flow vascular malformation identified.  2.  Variant [place] of [place] anatomy as above  No measurable stenosis of the bilateral internal carotid arteries based on NASCET criteria.  - Seizure/syncope precaution was given: No driving for 3 months after LOC , No height, No showering with doors locked, No swimming alone, No operating machinery    -Risk factor management for epilepsy was discussed- including avoiding sleep deprivation/stress/ missing AED/Drug or alcohol     - Neurology follow up at [facility] in 2 months  - Neurology will sign off    Karrie Samuels MD  [facility] Neurology  Office Phone [phone]    History of Present Illness   From H/P note  Bita Stephenson is a 71 year old female admitted on 4/14/2022. She has h/o HTN, CKD stage 3 and UTI. She was brought in to [facility] ER for evaluation of confusion.     She had a witnessed generalized tonic-clonic seizure in the ED, lasted about 4 minutes before was broken with 2 mg IV Ativan. She was intubated for airway protection in ER and transferred to [facility] ICU. She was started on Keppra and followed by NCC and ICU team. She self extubated yesterday, 4/15, now doing well on RA. Hospitalist service was called regarding the transfer of care.      From the patient/family    With respect to risk factors for epilepsy the patient denies any developmental delay, febrile seizure, TBI or meningitis/encephalitis, prior stroke, prior brain surgery, drug/alcohol history or family history of  seizure    tongue biting episode 1 week prior to the event+  no seizure, no shaking, staring episode, incontinence,   Head trauma, recent illness, sleep deprivation, stress, alcohol/Drug recently.  Denied anypanic attacks or fear sensations, rising sensation, zoning out, auditory/visual/olfactory hallucinations    Na 142  Glu  115  Mg N/A  Ca 8.5  UA  N/A  TSH WNL    Past Medical History    I have reviewed this patient's medical history and updated it with pertinent information if needed.   No past medical history on file.    Past Surgical History   I have reviewed this patient's surgical history and updated it with pertinent information if needed.  Past Surgical History:   Procedure Laterality Date     NO PAST SURGERIES         Prior to Admission Medications   Prior to Admission Medications   Prescriptions Last Dose Informant Patient Reported? Taking?   amLODIPine (NORVASC) 10 MG tablet 4/13/2022 at Unknown time Daughter Yes Yes   Sig: Take 10 mg by mouth daily    famotidine (PEPCID) 20 MG tablet PRN Daughter Yes Yes   Sig: Take 20 mg by mouth 2 times daily as needed   melatonin 3 MG tablet PRN Daughter Yes Yes   Sig: Take 3 mg by mouth nightly as needed for sleep      Facility-Administered Medications: None     Allergies   Allergies   Allergen Reactions     Paroxetine Itching     tingling     Sulfa Drugs      Other reaction(s): *Unknown     Valium [Diazepam] Itching       Social History   I have reviewed this patient's social history and updated it with pertinent information if needed. Bita Stephenson  reports that she quit smoking about 21 years ago. Her smoking use included cigarettes and cigarettes. She has a 42.00 pack-year smoking history. She has never used smokeless tobacco. She reports current drug use. Drug: Marijuana. She reports that she does not drink alcohol.    Family History   I have reviewed this patient's family history and updated it with pertinent information if needed.   Family History   Problem  Relation Age of Onset     Sudden Death Mother 62.00         at home, no autopsy     Heart Disease Mother         she had an unspecified type of heart surgery one month before her death     No Known Problems Son      No Known Problems Son      Diabetes Type 2  Son      No Known Problems Daughter      No Known Problems Daughter        Review of Systems   The 10 point Review of Systems is negative other than noted in the HPI or here.   LOC+  Patient denies any , HA, vision change (double vision/blurry vision/ vision loss), dizziness, imbalance, nausea, vomiting, dysphagia, dysarthria, weakness, numbness, tingling, incontinence, saddle anesthesia, prior seizure, stroke, trauma, brain surgery, weight change, recent illness, bleeding, bruising, fever, diarrhea, chest pain or shortness breath    Physical Exam   Temp: 98.4  F (36.9  C) Temp src: Oral BP: (!) 144/93 Pulse: 104   Resp: 16 SpO2: 96 % O2 Device: None (Room air)    Vital Signs with Ranges  Temp:  [98.2  F (36.8  C)-100.3  F (37.9  C)] 98.4  F (36.9  C)  Pulse:  [] 104  Resp:  [9-45] 16  BP: (123-161)/(83-98) 144/93  SpO2:  [93 %-100 %] 96 %  121 lbs 11.1 oz       General appearance:No acute distress   Neuro/Psych:Awake, alert, oriented x3. 2022 hospital   Cranial nerves: II-XII intact grossly    PERRLA, Extraocular movements intact. Visual field intact, No nystagmus, Face appears symmetric. Facial sensation intact. Hearing intact to finger rub. Palate midline. Shoulder shrug/SCM intact bilaterally. Tongue at midline with no fasciculations    Motor strength: at least 4/5 throughout.   Sensory: symmetric and intact to LT  Reflexes: 1+ throughout  Babinski/Clonus/Combs: absent.   Coordination: Finger-nose coordination intact  Romberg n/a.   Gait: n/a    total time : 50 minutes  More than 50% of the time was spent on discussing/coordinating care with hospital staffs.

## 2022-04-16 NOTE — PROGRESS NOTES
Critical Care Progress Note      04/16/2022    Name: Bita Stephenson MRN#: [id number]   Age: 71 year old YOB: 1950     Hsptl Day# 2  ICU DAY #    MV DAY #             Problem List:   Active Problems:    Benign essential hypertension    Status epilepticus (H)    Acute respiratory failure with hypoxia (H)    Encephalopathy             Summary/Hospital Course:      72 yo woman admitted on 4/14 for acute respiratory failure and status epilepticus. Seizures resolved. No specific cause found. Patient self extubated on 4/15 and stable since. This morning sleeping but wakes to voice and asks me how much longer she needs to be in the hospital. No complaints. No SOB or chest pain. No abdominal pain or diarrhea. No fever. No pain elsewhere.         Assessment and plan :     Bita Stephenson IS a 71 year old female admitted on 4/14/2022 for acute respiratory failure and resolving status epilepticus.   I have personally reviewed the daily labs, imaging studies, cultures and discussed the case with referring physician and consulting physicians.     My assessment and plan by system for this patient is as follows:    Neurology/Psychiatry:   1. Status epilepticus: resolved. No specific etiology was found but hippocampal atrophy was noted. On Keppra. Mental status improving  - PT/OT  - Seizure medications  - Resume PRN melatonin  - Other management per Neurology. Remove EEG leads and discontinue when able    Cardiovascular:   1.HTN on Amlodipine at home. BP here is a little high  - Increase Amlodipine back to home dose    Pulmonary/Ventilator Management:   1. Respiratory failure secondary to encephalopathy. Self extubated on 4/15. Stable  - OK for transfer out of ICU    GI and Nutrition :   1.Should be able to eat soon.    Renal/Fluids/Electrolytes:   1. Has CKD. Renal function at baseline. IVF is NS with some hypernatremia developing as of yesterday.  - Change to hypotonic formulation until eating. Recheck labs  - Replace  electrolytes PRN. Will avoid protocol given significant renal disease    Infectious Disease:   1. No issues at present     Endocrine:   1. Glucoses ok    Hematology/Oncology:   1. Chronic anemia. At baseline. No evidence of bleeding.      IV/Access:   1. Peripheral      ICU Prophylaxis:   1. DVT: enoxaparin  2. Disposition - transfer out of ICU      Key goals for next 24 hours:   1. Transfer out of ICU  2. PT/OT  3. Assess swallow  4. Treat BP                Key Medications:       amLODIPine  5 mg Oral or Feeding Tube Daily     enoxaparin ANTICOAGULANT  30 mg Subcutaneous Q24H     famotidine  20 mg Intravenous Q24H     ipratropium - albuterol 0.5 mg/2.5 mg/3 mL  3 mL Nebulization Q4H     levETIRAcetam  500 mg Intravenous Q12H       sodium chloride 75 mL/hr at 04/16/22 0023               Physical Examination:   Temp:  [98.2  F (36.8  C)-100.3  F (37.9  C)] 99.2  F (37.3  C)  Pulse:  [] 99  Resp:  [9-45] 16  BP: (101-161)/(61-98) 156/96  FiO2 (%):  [25 %-40 %] 40 %  SpO2:  [93 %-100 %] 97 %      Intake/Output Summary (Last 24 hours) at 4/16/2022 0948  Last data filed at 4/16/2022 0800  Gross per 24 hour   Intake 2005.76 ml   Output 1635 ml   Net 370.76 ml         Wt Readings from Last 4 Encounters:   04/16/22 55.2 kg (121 lb 11.1 oz)   04/14/22 57.1 kg (125 lb 14.1 oz)   12/08/21 54.4 kg (120 lb)   02/01/18 64.4 kg (142 lb)     BP - Mean:  [] 118  Vent Mode: PS  (Pressure Support)  FiO2 (%): 40 %  Resp Rate (Set): 14 breaths/min  Tidal Volume (Set, mL): 350 mL  PEEP (cm H2O): 5 cmH2O  Pressure Support (cm H2O): 5 cmH2O  Resp: 16    Recent Labs   Lab 04/15/22  0824 04/14/22  1624 04/14/22  0949   PH 7.33* 7.36 7.31*   PCO2 39 36 38   PO2 72* 105 84   HCO3 21 21 19*   O2PER 30 30 40       GEN: no acute distress;  HEENT: normal  PULM: clear anteriorly    CV/COR: RRR S1S2 no gallop,  No rub, no murmur  ABD: soft  EXT: no edema   NEURO: awake and alert, calm, moving all extremities spontaneously  SKIN: no  obvious rash; no cyanosis or mottling  LINES: none         Data:   All data and imaging reviewed     ROUTINE ICU LABS (Last four results)  CMP  Recent Labs   Lab 04/16/22  0020 04/15/22  1938 04/15/22  1646 04/15/22  1211 04/15/22  0745 04/15/22  0551 04/15/22  0232 04/14/22  1535 04/14/22  1504 04/14/22  0912   NA  --   --   --   --   --  142  --  138  --  143   POTASSIUM  --   --   --   --   --  3.6  --  4.2  --  4.1   CHLORIDE  --   --   --   --   --  115*  --  112*  --  111*   CO2  --   --   --   --   --  21  --  23  --  13*   ANIONGAP  --   --   --   --   --  6  --  3  --  19*   * 106* 116* 115*   < > 91   < > 102*   < > 157*   BUN  --   --   --   --   --  24  --  32*  --  35*   CR  --   --   --   --   --  1.56*  --  1.52*  --  1.64*   GFRESTIMATED  --   --   --   --   --  35*  --  36*  --  33*   TROY  --   --   --   --   --  8.1*  --  8.2*  --  9.0   MAG  --   --   --   --   --   --   --  2.4*  --   --    PHOS  --   --   --   --   --  4.1  --  3.3  --   --    PROTTOTAL  --   --   --   --   --   --   --  6.6*  --  7.2   ALBUMIN  --   --   --   --   --  2.8*  --  3.1*  --  3.8   BILITOTAL  --   --   --   --   --   --   --  0.2  --  0.3   ALKPHOS  --   --   --   --   --   --   --  56  --  63   AST  --   --   --   --   --   --   --  17  --  17   ALT  --   --   --   --   --   --   --  18  --  <9    < > = values in this interval not displayed.     CBC  Recent Labs   Lab 04/15/22  0551 04/14/22  1535 04/14/22  0912   WBC 9.8 13.5* 13.6*   RBC 4.23 4.38 4.77   HGB 11.5* 11.8 12.8   HCT 37.0 38.1 41.2   MCV 88 87 86   MCH 27.2 26.9 26.8   MCHC 31.1* 31.0* 31.1*   RDW 15.6* 15.2* 15.3*    233 278     INR  Recent Labs   Lab 04/14/22  1535 04/14/22  0917   INR 0.98 0.96     Arterial Blood Gas  Recent Labs   Lab 04/15/22  0824 04/14/22  1624 04/14/22  0949   PH 7.33* 7.36 7.31*   PCO2 39 36 38   PO2 72* 105 84   HCO3 21 21 19*   O2PER 30 30 40       Daniela Pinzon MD

## 2022-04-16 NOTE — PROGRESS NOTES
Neuro:Post extubation alert and oriented. PERRL. Following simple commands, too lethargic to follow more complex commands. Lethargy is improving as evening progresses. CARRANZA equally    Cardiovascular: Tele: ST MAP > 65, no pressors     Pulmonary: Lungs clear, extubated today. Now on RA. Encouraging CDB.    : Swan removed, has voided since removal. Purewick in place    GI: NPO pending more awake and passing swallow eval. No BM.     Skin: Dry. Intact    Mobility: Bedrest.     Drips: None    Daughter Alice at bedside and updated. Continue to monitor.

## 2022-04-17 ENCOUNTER — APPOINTMENT (OUTPATIENT)
Dept: PHYSICAL THERAPY | Facility: CLINIC | Age: 72
DRG: 100 | End: 2022-04-17
Attending: INTERNAL MEDICINE
Payer: COMMERCIAL

## 2022-04-17 ENCOUNTER — APPOINTMENT (OUTPATIENT)
Dept: OCCUPATIONAL THERAPY | Facility: CLINIC | Age: 72
DRG: 100 | End: 2022-04-17
Attending: INTERNAL MEDICINE
Payer: COMMERCIAL

## 2022-04-17 VITALS
HEART RATE: 85 BPM | SYSTOLIC BLOOD PRESSURE: 126 MMHG | BODY MASS INDEX: 21.9 KG/M2 | OXYGEN SATURATION: 98 % | RESPIRATION RATE: 16 BRPM | DIASTOLIC BLOOD PRESSURE: 94 MMHG | TEMPERATURE: 98.4 F | WEIGHT: 121.69 LBS

## 2022-04-17 LAB
CREAT SERPL-MCNC: 1.13 MG/DL (ref 0.52–1.04)
GFR SERPL CREATININE-BSD FRML MDRD: 51 ML/MIN/1.73M2
PLATELET # BLD AUTO: 237 10E3/UL (ref 150–450)

## 2022-04-17 PROCEDURE — 82565 ASSAY OF CREATININE: CPT | Performed by: INTERNAL MEDICINE

## 2022-04-17 PROCEDURE — 85049 AUTOMATED PLATELET COUNT: CPT | Performed by: INTERNAL MEDICINE

## 2022-04-17 PROCEDURE — 36415 COLL VENOUS BLD VENIPUNCTURE: CPT | Performed by: INTERNAL MEDICINE

## 2022-04-17 PROCEDURE — 250N000013 HC RX MED GY IP 250 OP 250 PS 637: Performed by: INTERNAL MEDICINE

## 2022-04-17 PROCEDURE — 97161 PT EVAL LOW COMPLEX 20 MIN: CPT | Mod: GP | Performed by: PHYSICAL THERAPIST

## 2022-04-17 PROCEDURE — 97535 SELF CARE MNGMENT TRAINING: CPT | Mod: GO | Performed by: OCCUPATIONAL THERAPIST

## 2022-04-17 PROCEDURE — 97116 GAIT TRAINING THERAPY: CPT | Mod: GP | Performed by: PHYSICAL THERAPIST

## 2022-04-17 PROCEDURE — 99231 SBSQ HOSP IP/OBS SF/LOW 25: CPT | Performed by: HOSPITALIST

## 2022-04-17 PROCEDURE — 250N000011 HC RX IP 250 OP 636: Performed by: INTERNAL MEDICINE

## 2022-04-17 PROCEDURE — 97166 OT EVAL MOD COMPLEX 45 MIN: CPT | Mod: GO | Performed by: OCCUPATIONAL THERAPIST

## 2022-04-17 RX ORDER — LEVETIRACETAM 500 MG/1
500 TABLET ORAL 2 TIMES DAILY
Qty: 60 TABLET | Refills: 0 | Status: SHIPPED | OUTPATIENT
Start: 2022-04-17

## 2022-04-17 RX ORDER — ENOXAPARIN SODIUM 100 MG/ML
40 INJECTION SUBCUTANEOUS EVERY 24 HOURS
Status: DISCONTINUED | OUTPATIENT
Start: 2022-04-17 | End: 2022-04-17 | Stop reason: HOSPADM

## 2022-04-17 RX ADMIN — ENOXAPARIN SODIUM 40 MG: 40 INJECTION SUBCUTANEOUS at 13:57

## 2022-04-17 RX ADMIN — LEVETIRACETAM 500 MG: 5 INJECTION INTRAVENOUS at 07:51

## 2022-04-17 RX ADMIN — AMLODIPINE BESYLATE 10 MG: 10 TABLET ORAL at 13:57

## 2022-04-17 ASSESSMENT — ACTIVITIES OF DAILY LIVING (ADL)
ADLS_ACUITY_SCORE: 9

## 2022-04-17 NOTE — PROGRESS NOTES
LTV day 2 completed. The pt was moved from ICU to the 7 th floor during the recording. The data was not collected during the time pt was moved.

## 2022-04-17 NOTE — PROGRESS NOTES
[facility]  Hospitalist Progress Note  [facility]        Date of Service (when I saw the patient): 04/17/2022        Interval History:      Patient states she is doing well, awaiting PT evaluation.          Assessment and Plan:        Bita Stephenson is a 71 year old female admitted on 4/14/2022. She has h/o HTN, CKD stage 3 and UTI. She was brought in to [facility] ER for evaluation of confusion. She had a witnessed generalized tonic-clonic seizure in the ED, lasted about 4 minutes before was broken with 2 mg IV Ativan. She was intubated for airway protection in ER and transferred to [facility] ICU. She was started on Keppra and followed by NCC and ICU team. She self extubated yesterday, 4/15, now doing well on RA. Hospitalist service was called regarding the transfer of care.     Status epilepticus, Altered mental status she was brought in to [facility] ER for evaluation of confusion she had a witnessed generalized tonic-clonic seizure in the ED, lasted about 4 minutes before was broken with 2 mg IV Ativan. no prior h/o seizure smokes marijuana but does not uses other drugs She was intubated for airway protection in ER and transferred to [facility] ICU. CT head- no acute pathology; presumed chronic hypertensive/microvascular ischemic white matter changes; CTA head and neck negative; MRI brain- cerebral volume loss and hippocampal atrophy.   - Neurology following.   - Keppra.   - SLP  - PT/OT     Sepsis, ruled out     HTN  - Norvasc      CKD stage 3     DVT/Px- Lovenox    Disposition: Today or tomorrow pending PT eval.     Diet: Orders Placed This Encounter      Regular Diet Adult    Code: Full Code    Length of Stay:  LOS: 3 days /LOS: 3    Dr. Adrian Enriquez DO, JEROME, MHA  [facility] Hospitalist  Pager [phone]    Securely message with the Vocera Web Console (learn more here)  Text page via [employer] Paging/Directory      Clinically Significant Risk Factors Present on Admission                                   Physical Exam:           Blood pressure (!) 125/93, pulse 99, temperature 99.1  F (37.3  C), temperature source Oral, resp. rate 16, weight 55.2 kg (121 lb 11.1 oz), SpO2 95 %.    Vital Sign Ranges  Temperature Temp  Av.8  F (37.1  C)  Min: 98.4  F (36.9  C)  Max: 99.1  F (37.3  C)   Blood pressure Systolic (24hrs), Av , Min:125 , Max:157        Diastolic (24hrs), Av, Min:83, Max:94      Pulse Pulse  Av.2  Min: 98  Max: 105   Respirations Resp  Av.1  Min: 10  Max: 20   Pulse oximetry SpO2  Av.1 %  Min: 92 %  Max: 98 %     Vital Signs with Ranges  Temp:  [98.4  F (36.9  C)-99.1  F (37.3  C)] 99.1  F (37.3  C)  Pulse:  [] 99  Resp:  [10-20] 16  BP: (125-157)/(83-94) 125/93  SpO2:  [92 %-98 %] 95 %  Temp:  [98.4  F (36.9  C)-99.1  F (37.3  C)] 99.1  F (37.3  C)  Pulse:  [] 99  Resp:  [10-20] 16  BP: (125-157)/(83-94) 125/93  SpO2:  [92 %-98 %] 95 %    I/O Last 3 Shifts:   I/O last 3 completed shifts:  In: 262.5 [I.V.:262.5]  Out: 1400 [Urine:1400]    I/O past 24 hours:     Intake/Output Summary (Last 24 hours) at 2022 0830  Last data filed at 2022 0600  Gross per 24 hour   Intake 112.5 ml   Output 1400 ml   Net -1287.5 ml       Oxygen  Temp: 99.1  F (37.3  C) Temp src: Oral BP: (!) 125/93 Pulse: 99   Resp: 16 SpO2: 95 % O2 Device: None (Room air)    Vitals:    04/15/22 0600 22 0400   Weight: 58.1 kg (128 lb 1.4 oz) 55.2 kg (121 lb 11.1 oz)       Lines  Peripheral IV 22 Left Lower forearm (Active)   Site Assessment WDL 22   Line Status Saline locked 22   Phlebitis Scale 0-->no symptoms 22   Infiltration Scale 0 22   Infiltration Site Treatment Method  None 22 1600   Number of days: 3     GENERAL: NAD. Conversational, appropriate.   HEENT: Normocephalic. EOMI. No icterus or injection. Nares normal.   LUNGS: Clear to auscultation. No dyspnea at rest.   HEART: Regular rate.  Extremities perfused.   ABDOMEN: Soft, nontender, and nondistended. Positive bowel sounds.   EXTREMITIES: No LE edema noted.   PSYCH: Alert and oriented to person  NEUROLOGIC: Moves extremities x4, follows commands.          Prior to Admission Medications:        Medications Prior to Admission   Medication Sig Dispense Refill Last Dose     amLODIPine (NORVASC) 10 MG tablet Take 10 mg by mouth daily    4/13/2022 at Unknown time     famotidine (PEPCID) 20 MG tablet Take 20 mg by mouth 2 times daily as needed   PRN     melatonin 3 MG tablet Take 3 mg by mouth nightly as needed for sleep   PRN            Medications:        Current Facility-Administered Medications   Medication Last Rate     Current Facility-Administered Medications   Medication Dose Route Frequency     amLODIPine  10 mg Oral or Feeding Tube Daily     enoxaparin ANTICOAGULANT  30 mg Subcutaneous Q24H     famotidine  20 mg Intravenous Q24H     levETIRAcetam  500 mg Intravenous Q12H     sodium chloride (PF)  3 mL Intracatheter Q8H     Current Facility-Administered Medications   Medication Dose Route Frequency     acetaminophen  650 mg Oral Q4H PRN     bisacodyl  10 mg Rectal Daily PRN     glucose  15-30 g Oral Q15 Min PRN    Or     dextrose  25-50 mL Intravenous Q15 Min PRN    Or     glucagon  1 mg Subcutaneous Q15 Min PRN     lidocaine 4%   Topical Q1H PRN     lidocaine (buffered or not buffered)  0.1-1 mL Other Q1H PRN     LORazepam  2 mg Intravenous Q5 Min PRN     melatonin  5 mg Oral At Bedtime PRN     naloxone  0.2 mg Intravenous Q2 Min PRN    Or     naloxone  0.4 mg Intravenous Q2 Min PRN    Or     naloxone  0.2 mg Intramuscular Q2 Min PRN    Or     naloxone  0.4 mg Intramuscular Q2 Min PRN     ondansetron  4 mg Oral Q6H PRN    Or     ondansetron  4 mg Intravenous Q6H PRN     polyethylene glycol  17 g Oral Daily PRN     senna-docusate  1 tablet Oral BID PRN    Or     senna-docusate  2 tablet Oral BID PRN     sodium chloride (PF)  3 mL Intracatheter  q1 min prn     ___________________________________________________________________________  Medications       amLODIPine  10 mg Oral or Feeding Tube Daily     enoxaparin ANTICOAGULANT  30 mg Subcutaneous Q24H     famotidine  20 mg Intravenous Q24H     levETIRAcetam  500 mg Intravenous Q12H     sodium chloride (PF)  3 mL Intracatheter Q8H          Data:      Lab data reviewed.     Data   Recent Labs   Lab 04/16/22  1029 04/16/22  0020 04/15/22  1938 04/15/22  0745 04/15/22  0551 04/15/22  0232 04/14/22  1535 04/14/22  1504 04/14/22  0917 04/14/22  0912   WBC  --   --   --   --  9.8  --  13.5*  --   --  13.6*   HGB  --   --   --   --  11.5*  --  11.8  --   --  12.8   MCV  --   --   --   --  88  --  87  --   --  86   PLT  --   --   --   --  216  --  233  --   --  278   INR  --   --   --   --   --   --  0.98  --  0.96  --      --   --   --  142  --  138  --   --  143   POTASSIUM 3.4  --   --   --  3.6  --  4.2  --   --  4.1   CHLORIDE 115*  --   --   --  115*  --  112*  --   --  111*   CO2 20  --   --   --  21  --  23  --   --  13*   BUN 17  --   --   --  24  --  32*  --   --  35*   CR 1.15*  --   --   --  1.56*  --  1.52*  --   --  1.64*   ANIONGAP 7  --   --   --  6  --  3  --   --  19*   TROY 8.5  --   --   --  8.1*  --  8.2*  --   --  9.0   GLC 88 116* 106*   < > 91   < > 102*   < >  --  157*   ALBUMIN  --   --   --   --  2.8*  --  3.1*  --   --  3.8   PROTTOTAL  --   --   --   --   --   --  6.6*  --   --  7.2   BILITOTAL  --   --   --   --   --   --  0.2  --   --  0.3   ALKPHOS  --   --   --   --   --   --  56  --   --  63   ALT  --   --   --   --   --   --  18  --   --  <9   AST  --   --   --   --   --   --  17  --   --  17   LIPASE  --   --   --   --   --   --   --   --   --  39    < > = values in this interval not displayed.           Imaging:      Imaging data reviewed.     No results found for this or any previous visit (from the past 24 hour(s)).    Dr. Adrian Enriquez DO, JEROME, A  [facility]  Hospitalist  Pager [phone]    Securely message with the Vocera Web Console (learn more here)  Text page via [employer] Paging/Directory

## 2022-04-17 NOTE — DISCHARGE SUMMARY
Physician Discharge Summary        Primary Care Physician:  Rebekah Cain Admission Date: 4/14/2022   Discharge Provider: Adrian Enriquez DO Discharge Date: 4/17/2022   Disposition: Home  Code Status: Full Code   Activity: as directed by therapies Diet: Orders Placed This Encounter      Regular Diet Adult      Diet        Condition at Discharge: stable.      Discharge Diagnoses/Hospital Summary:      Bita Stephenson is a 71 year old female admitted on 4/14/2022. She has h/o HTN, CKD stage 3 and UTI. She was brought in to [facility] ER for evaluation of confusion. She had a witnessed generalized tonic-clonic seizure in the ED, lasted about 4 minutes before was broken with 2 mg IV Ativan. She was intubated for airway protection in ER and transferred to [facility] ICU. She was started on Keppra and followed by NCC and ICU team. She self extubated yesterday, 4/15, now doing well on RA. Hospitalist service was called regarding the transfer of care.     Status epilepticus, Altered mental status she was brought in to [facility] ER for evaluation of confusion she had a witnessed generalized tonic-clonic seizure in the ED, lasted about 4 minutes before was broken with 2 mg IV Ativan. no prior h/o seizure smokes marijuana but does not uses other drugs She was intubated for airway protection in ER and transferred to [facility] ICU. CT head- no acute pathology; presumed chronic hypertensive/microvascular ischemic white matter changes; CTA head and neck negative; MRI brain- cerebral volume loss and hippocampal atrophy.   - Neurology follow up.   - Close outpatient follow up.    - Keppra.   - Therapy evaluations completed, follow up.      Sepsis, ruled out     HTN  - Norvasc      CKD stage 3    Discharge Medications:      Review of your medicines      START taking      Dose / Directions   levETIRAcetam 500 MG tablet  Commonly known as: KEPPRA      Dose: 500 mg  Take 1 tablet (500 mg) by mouth 2 times daily  Quantity: 60  tablet  Refills: 0        CONTINUE these medicines which have NOT CHANGED      Dose / Directions   amLODIPine 10 MG tablet  Commonly known as: NORVASC      Dose: 10 mg  Take 10 mg by mouth daily  Refills: 0     famotidine 20 MG tablet  Commonly known as: PEPCID  Indication: Heartburn      Dose: 20 mg  Take 20 mg by mouth 2 times daily as needed  Refills: 0     melatonin 3 MG tablet      Dose: 3 mg  Take 3 mg by mouth nightly as needed for sleep  Refills: 0           Where to get your medicines      These medications were sent to [facility] [place], [place] - [address] [address]  [address], [facility] [address]    Phone: [phone]     levETIRAcetam 500 MG tablet               Discharge Instructions:  Follow up appointment with Primary Care Physician: Rebekah Cain  Follow up appointment with Specialist: Neurology as directed.        Vital Signs in last 24 hours:    Temp:  [98.4  F (36.9  C)-99.1  F (37.3  C)] 99.1  F (37.3  C)  Pulse:  [] 99  Resp:  [10-18] 16  BP: (125-157)/(88-94) 125/93  SpO2:  [92 %-98 %] 95 %    GENERAL: NAD. Conversational, appropriate.   HEENT: Normocephalic. EOMI. No icterus or injection. Nares normal.   LUNGS: Clear to auscultation. No dyspnea at rest.   HEART: Regular rate. Extremities perfused.   ABDOMEN: Soft, nontender, and nondistended. Positive bowel sounds.   EXTREMITIES: No LE edema noted.   PSYCH: Alert and oriented to person  NEUROLOGIC: Moves extremities x4, follows commands.     Total Time on discharge process is: 32 minutes    Dr. Adrian Enriquez DO, JEROME  Internal Medicine Hospitalist  4/17/2022

## 2022-04-17 NOTE — PLAN OF CARE
Pt here for Status Epilepticus    VSS. No seizure activity noted. No changes to neuros. Compliant with cares. Denied pain. Slept well on the overnight. Will continue to monitor.     BP (!) 142/93   Pulse 103   Temp 98.9  F (37.2  C)   Resp 16   Wt 55.2 kg (121 lb 11.1 oz)   SpO2 93%   BMI 21.90 kg/m

## 2022-04-17 NOTE — PROGRESS NOTES
Patient discharging to home with daughter Alice via car at 1437. AVS and education given to patient and daughter. Home care ordered, patient finding her own per her request. Discharge medications reviewed with patient.

## 2022-04-17 NOTE — PROGRESS NOTES
04/17/22 0806   Quick Adds   Type of Visit Initial Occupational Therapy Evaluation   Living Environment   People in Home child(meli), adult   Current Living Arrangements house   Home Accessibility stairs to enter home   Number of Stairs, Main Entrance 2  (with railing)   Living Environment Comments Pt can stay on main level   Self-Care   Current Activity Tolerance fair   Equipment Currently Used at Home none   Fall history within last six months yes  (Tripped over a rug)   Number of times patient has fallen within last six months 1   Activity/Exercise/Self-Care Comment Pt is a retired . Has access to a shower chair.   Instrumental Activities of Daily Living (IADL)   Previous Responsibilities driving;medication management;finances;meal prep;housekeeping;laundry;shopping   General Information   Onset of Illness/Injury or Date of Surgery 04/14/22   Referring Physician Daniela Pinzon MD   Patient/Family Therapy Goal Statement (OT) Go home today with daughter   Additional Occupational Profile Info/Pertinent History of Current Problem Bita Stephenson is a 71 year old female admitted on 4/14/2022. She has h/o HTN, CKD stage 3 and UTI. She was brought in to [facility] ER for evaluation of confusion. She had a witnessed generalized tonic-clonic seizure in the ED, lasted about 4 minutes before was broken with 2 mg IV Ativan. She was intubated for airway protection in ER and transferred to [facility] ICU. She was started on Keppra and followed by NCC and ICU team. She self extubated yesterday, 4/15, now doing well on RA.   Existing Precautions/Restrictions fall;seizures   Cognitive Status Examination   Orientation Status orientation to person, place and time   Follows Commands follows two-step commands;over 90% accuracy   Cognitive Status Comments Needs further assessment. Pt reports she feels back to normal with her thinking.   Visual Perception   Visual Impairment/Limitations WNL   Sensory   Sensory Quick Adds  No deficits were identified   Pain Assessment   Patient Currently in Pain No   Integumentary/Edema   Integumentary/Edema no deficits were identifed   Posture   Posture not impaired   Range of Motion Comprehensive   General Range of Motion no range of motion deficits identified   Strength Comprehensive (MMT)   Comment, General Manual Muscle Testing (MMT) Assessment Pt reports legs are weak, especially in knees.   Muscle Tone Assessment   Muscle Tone Quick Adds No deficits were identified   Coordination   Upper Extremity Coordination No deficits were identified   Bed Mobility   Comment (Bed Mobility) SBA supine to/from EOB   Transfers   Transfer Comments Min A without WW; CGA with WW   Balance   Balance Comments Unsteady, legs feel weak. Does better with a walker   Activities of Daily Living   BADL Assessment/Intervention lower body dressing   Lower Body Dressing Assessment/Training   Comment, (Lower Body Dressing) SBA to piedad socks while seated   Clinical Impression   Criteria for Skilled Therapeutic Interventions Met (OT) Yes, treatment indicated   OT Diagnosis Impaired ADL and IADL   OT Problem List-Impairments impacting ADL strength;cognition;balance;problems related to;activity tolerance impaired;mobility   Assessment of Occupational Performance 3-5 Performance Deficits   Identified Performance Deficits ADL, IADL   Planned Therapy Interventions (OT) ADL retraining;IADL retraining;balance training;cognition;transfer training;risk factor education   Clinical Decision Making Complexity (OT) moderate complexity   Anticipated Equipment Needs Upon Discharge (OT) shower chair;walker, rolling   Risk & Benefits of therapy have been explained evaluation/treatment results reviewed;care plan/treatment goals reviewed;risks/benefits reviewed;current/potential barriers reviewed;participants voiced agreement with care plan;participants included;patient   OT Discharge Planning   OT Discharge Recommendation (DC Rec) home with  assist;home with outpatient occupational therapy   OT Rationale for DC Rec Pt reports that if she has a walker she feels comfortable going home with her daughter's support. She has access to a shower chair. She declines home care. May benefit from OP PT for balance. Cognition to be further assessed next session however no overt cognitive impairment noted.   OT Brief overview of current status CGA bed to chair with WW.   Total Evaluation Time (Minutes)   Total Evaluation Time (Minutes) 15   OT Goals   Therapy Frequency (OT) Daily   OT Predicted Duration/Target Date for Goal Attainment 04/20/22   OT Goals Hygiene/Grooming;Upper Body Dressing;Lower Body Dressing;Toilet Transfer/Toileting;Cognition   OT: Hygiene/Grooming independent;while standing   OT: Upper Body Dressing Modified independent;including set-up/clothing retrieval   OT: Lower Body Dressing Modified independent;including set-up/clothing retrieval   OT: Toilet Transfer/Toileting Modified independent;cleaning and garment management;using adaptive equipment   OT: Cognitive Patient/caregiver will verbalize understanding of cognitive assessment results/recommendations as needed for safe discharge planning

## 2022-04-17 NOTE — PROGRESS NOTES
04/17/22 1341   Quick Adds   Type of Visit Initial PT Evaluation   Living Environment   People in Home child(meli), adult   Current Living Arrangements house   Home Accessibility stairs to enter home   Number of Stairs, Main Entrance 2   Stair Railings, Main Entrance none  (Has a vertical rail to hold onto.)   Transportation Anticipated car, drives self;family or friend will provide  (Daughter reports she can provide and will provide to OPPT)   Living Environment Comments Patient was I without an AD prior to admission.   Self-Care   Usual Activity Tolerance good   Current Activity Tolerance fair   Equipment Currently Used at Home none   Fall history within last six months yes   Number of times patient has fallen within last six months 1   Activity/Exercise/Self-Care Comment Daughter present and reports either she or her sister will be with the pateint 24/7. They can transport to OPPT   General Information   Onset of Illness/Injury or Date of Surgery 04/14/22   Referring Physician London Yap   Patient/Family Therapy Goals Statement (PT) To go home with daughter with a walker and agreeable to OPPT   Pertinent History of Current Problem (include personal factors and/or comorbidities that impact the POC) Bita Stephenson is a 71 year old female admitted on 4/14/2022. She has h/o HTN, CKD stage 3 and UTI. She was brought in to [facility] ER for evaluation of confusion. She had a witnessed generalized tonic-clonic seizure in the ED, lasted about 4 minutes before was broken with 2 mg IV Ativan   Existing Precautions/Restrictions seizures   Cognition   Affect/Mental Status (Cognition) WNL   Orientation Status (Cognition) oriented x 4   Follows Commands (Cognition) WFL   Pain Assessment   Patient Currently in Pain No   Integumentary/Edema   Integumentary/Edema Comments Right knee appears a little bigger (? swollen) than right.   Posture    Posture Comments Slight head forward and looks down. Improved with cues.   Range  of Motion (ROM)   Range of Motion ROM is WNL   Strength Comprehensive (MMT)   Comment, General Manual Muscle Testing (MMT) Assessment Bilateral DF 3+/5, bilateral hip flex 3+/5, right quads 3/5, left 4/5. Noted crepitus right knee with resistance.   Bed Mobility   Comment, (Bed Mobility) Did  not assess but based on other mobilitiy don't anticipate any difficulty.   Transfers   Comment, (Transfers) Sit to stand with min A from chair. Unsteady with initial standing.   Gait/Stairs (Locomotion)   Comment, (Gait/Stairs) Amb 15 feet without AD. Patient unsteady, limp on right (she and daughter report this is not baseline) and tends to want to reach for items to hold onto.   Balance   Balance Comments Good sitting balance, Rhomberg +, Can maintain static standing with perturbations with feet shd width apart, impaired dynamic balance.   Sensory Examination   Sensory Perception WNL   Coordination   Coordination no deficits were identified   Clinical Impression   Criteria for Skilled Therapeutic Intervention Yes, treatment indicated   PT Diagnosis (PT) Impaired functional independence   Influenced by the following impairments Slight bilateral LE weakness and increased right quad weakness compared to left, impaired balance   Functional limitations due to impairments Needs assist for transfers and amb.   Clinical Presentation (PT Evaluation Complexity) Stable/Uncomplicated   Clinical Presentation Rationale <3 factors affecting mobility   Clinical Decision Making (Complexity) low complexity   Planned Therapy Interventions (PT) balance training;gait training;stair training   Anticipated Equipment Needs at Discharge (PT) walker, rolling  (Will be issued one for home.)   Risk & Benefits of therapy have been explained evaluation/treatment results reviewed;care plan/treatment goals reviewed;risks/benefits reviewed;participants voiced agreement with care plan;participants included;patient;daughter   PT Discharge Planning   PT  Discharge Recommendation (DC Rec) home with outpatient physical therapy   PT Rationale for DC Rec Patient is able to demonstrate modified independence with transfers and with amb with use of ww. She is able to safety navigate steps with assist of daughter. She would benefit from OPPT to address balance deficits and progress back to baseline independence without the walker.   Total Evaluation Time   Total Evaluation Time (Minutes) 12   Physical Therapy Goals   PT Frequency One time eval and treatment only   PT Predicted Duration/Target Date for Goal Attainment 04/17/22   PT Goals Transfers;Gait;Stairs   PT: Transfers Modified independent;Sit to/from stand;Bed to/from chair;Assistive device;Goal Met   PT: Gait Modified independent;Rolling walker;Greater than 200 feet;Goal Met   PT: Stairs Minimal assist;2 stairs;Goal Met  (With assist of daughter.)

## 2022-04-17 NOTE — PROGRESS NOTES
SW:  D: Call received from Zay with [facility] stating they received a referral for homecare for patient and have to decline due to insurance. Per chart review, therapy is recommending home with assist and outpatient occupational therapy,. Per SLP: Patient's swallow function is below baseline and depending on PT evaluation may be appropriate for ARU.     P: PT consulted. Care management will monitor for needs at discharge.     ISABELLE Gan, LGSW   Social Work   [facility]

## 2022-04-17 NOTE — PLAN OF CARE
Reason for Admission: Status epilepticus    Cognitive/Mentation: A/Ox 4  Neuros/CMS: Intact  VS: Stable.  GI: BS +, + flatus, last BM PTA. Continent.  : Purewick in place.  Pulmonary: LS diminished.  Pain: Denies.     Drains: None  Skin: None  Activity: Assist x 2 with lift.  Diet: Soft and bite sized with thin liquids. Take pills whole.     Aggression Stoplight Score: Green  Therapies recs: Pending  Discharge: Pending    End of shift summary: Patient transferred from ICU this evening. EEG discontinued per Neurocritical and hospitalist, EEG leads and monitor still in place as EEG techs not available to remove. Seizure precautions maintained. To be seen by PT tomorrow or Monday.

## 2022-04-18 ENCOUNTER — PATIENT OUTREACH (OUTPATIENT)
Dept: CARE COORDINATION | Facility: CLINIC | Age: 72
End: 2022-04-18
Payer: COMMERCIAL

## 2022-04-18 DIAGNOSIS — Z71.89 OTHER SPECIFIED COUNSELING: ICD-10-CM

## 2022-04-18 NOTE — PROGRESS NOTES
Clinic Care Coordination Contact  [facility]: Post-Discharge Note  SITUATION                                                      Admission:    Admission Date: 04/14/22   Reason for Admission: Seizure  Discharge:   Discharge Date: 04/17/22  Discharge Diagnosis: Seizure    BACKGROUND                                                      Per hospital discharge summary and inpatient provider notes:  71 year old female admitted on 4/14/2022. She has h/o HTN, CKD stage 3 and UTI. She was brought in to [facility] ER for evaluation of confusion. She had a witnessed generalized tonic-clonic seizure in the ED, lasted about 4 minutes before was broken with 2 mg IV Ativan. She was intubated for airway protection in ER and transferred to [facility] ICU. She was started on Keppra and followed by NCC and ICU team. She self extubated yesterday, 4/15, now doing well on RA. Hospitalist service was called regarding the transfer of care.     Status epilepticus, Altered mental status she was brought in to [facility] ER for evaluation of confusion she had a witnessed generalized tonic-clonic seizure in the ED, lasted about 4 minutes before was broken with 2 mg IV Ativan. no prior h/o seizure smokes marijuana but does not uses other drugs She was intubated for airway protection in ER and transferred to [facility] ICU. CT head- no acute pathology; presumed chronic hypertensive/microvascular ischemic white matter changes; CTA head and neck negative; MRI brain- cerebral volume loss and hippocampal atrophy.   - Neurology following.   - Keppra.   - SLP  - PT/OT     Sepsis, ruled out     HTN  - Norvasc      CKD stage 3     DVT/Px- Lovenox    ASSESSMENT      Enrollment  Primary Care Care Coordination Status: Not a Candidate    Discharge Assessment  How are you doing now that you are home?: Doing better  How are your symptoms? (Red Flag symptoms escalate to triage hotline per guidelines): Improved  Do you feel your condition is stable enough to be safe at  home until your provider visit?: Yes  Does the patient have their discharge instructions? : Yes  Does the patient have questions regarding their discharge instructions? : No  Were you started on any new medications or were there changes to any of your previous medications? : Yes  Does the patient have all of their medications?: Yes  Do you have questions regarding any of your medications? : No  Do you have all of your needed medical supplies or equipment (DME)?  (i.e. oxygen tank, CPAP, cane, etc.): Yes  Discharge follow-up appointment scheduled within 14 calendar days? : No (CTA encouraged her to make the call today.)  Is patient agreeable to assistance with scheduling? : No        PLAN                                                      Outpatient Plan:  Follow up with primary care provider, Rebekah Cain, within 7  days for hospital follow- up. The following labs/tests are  recommended: cbc/bmp.  Follow up with Neurology as directed.    No future appointments.      For any urgent concerns, please contact our 24 hour nurse triage line: [phone] ([phone])         Jacy Fitzgerald MA

## 2022-04-18 NOTE — PLAN OF CARE
Speech Language Therapy Discharge Summary    Reason for therapy discharge:    Discharged to home.    Progress towards therapy goal(s). See goals on Care Plan in [facility] electronic health record for goal details.  Goals partially met.  Barriers to achieving goals:   discharge from facility.    Therapy recommendation(s):    No further therapy is recommended. Patient's diet was advanced to regular by MD  (previously on IDDSI level 6 soft/bite size primarily due to fatigue on day of evaluation) and no reports of difficulty swallowing. No further skilled intervention is indicated. Continue on a regular diet and thin liquids.

## 2022-04-19 LAB
ATRIAL RATE - MUSE: 84 BPM
BACTERIA BLD CULT: NO GROWTH
BACTERIA BLD CULT: NO GROWTH
DIASTOLIC BLOOD PRESSURE - MUSE: NORMAL MMHG
INTERPRETATION ECG - MUSE: NORMAL
P AXIS - MUSE: 55 DEGREES
PR INTERVAL - MUSE: 154 MS
QRS DURATION - MUSE: 88 MS
QT - MUSE: 384 MS
QTC - MUSE: 453 MS
R AXIS - MUSE: -37 DEGREES
SYSTOLIC BLOOD PRESSURE - MUSE: NORMAL MMHG
T AXIS - MUSE: 57 DEGREES
VENTRICULAR RATE- MUSE: 84 BPM

## 2022-07-23 ENCOUNTER — HEALTH MAINTENANCE LETTER (OUTPATIENT)
Age: 72
End: 2022-07-23

## 2022-10-01 ENCOUNTER — HEALTH MAINTENANCE LETTER (OUTPATIENT)
Age: 72
End: 2022-10-01

## 2023-09-03 ENCOUNTER — HEALTH MAINTENANCE LETTER (OUTPATIENT)
Age: 73
End: 2023-09-03

## 2024-10-27 ENCOUNTER — HEALTH MAINTENANCE LETTER (OUTPATIENT)
Age: 74
End: 2024-10-27

## 2025-05-03 ENCOUNTER — APPOINTMENT (OUTPATIENT)
Dept: RADIOLOGY | Facility: CLINIC | Age: 75
End: 2025-05-03
Attending: EMERGENCY MEDICINE
Payer: COMMERCIAL

## 2025-05-03 ENCOUNTER — HOSPITAL ENCOUNTER (OUTPATIENT)
Facility: CLINIC | Age: 75
Setting detail: OBSERVATION
Discharge: HOME | End: 2025-05-06
Attending: HOSPITALIST | Admitting: INTERNAL MEDICINE
Payer: COMMERCIAL

## 2025-05-03 ENCOUNTER — HOSPITAL ENCOUNTER (EMERGENCY)
Facility: CLINIC | Age: 75
Discharge: ANOTHER HOSPITAL | End: 2025-05-03
Attending: EMERGENCY MEDICINE | Admitting: EMERGENCY MEDICINE
Payer: COMMERCIAL

## 2025-05-03 ENCOUNTER — APPOINTMENT (OUTPATIENT)
Dept: CT IMAGING | Facility: CLINIC | Age: 75
End: 2025-05-03
Attending: EMERGENCY MEDICINE
Payer: COMMERCIAL

## 2025-05-03 VITALS
BODY MASS INDEX: 20.88 KG/M2 | OXYGEN SATURATION: 99 % | SYSTOLIC BLOOD PRESSURE: 132 MMHG | WEIGHT: 116 LBS | RESPIRATION RATE: 18 BRPM | DIASTOLIC BLOOD PRESSURE: 66 MMHG | HEART RATE: 88 BPM | TEMPERATURE: 99.8 F

## 2025-05-03 DIAGNOSIS — D18.1 HYGROMA: ICD-10-CM

## 2025-05-03 DIAGNOSIS — R56.9 SEIZURE (H): ICD-10-CM

## 2025-05-03 DIAGNOSIS — K11.8 PAROTID NODULE: ICD-10-CM

## 2025-05-03 DIAGNOSIS — N17.9 AKI (ACUTE KIDNEY INJURY): ICD-10-CM

## 2025-05-03 DIAGNOSIS — E53.8 B12 DEFICIENCY: ICD-10-CM

## 2025-05-03 DIAGNOSIS — D44.7 PARAGANGLIOMA (H): ICD-10-CM

## 2025-05-03 DIAGNOSIS — M27.9: ICD-10-CM

## 2025-05-03 DIAGNOSIS — Z91.199 CURRENT NONADHERENCE TO MEDICAL TREATMENT: ICD-10-CM

## 2025-05-03 DIAGNOSIS — J18.9 PNEUMONIA DUE TO INFECTIOUS ORGANISM, UNSPECIFIED LATERALITY, UNSPECIFIED PART OF LUNG: ICD-10-CM

## 2025-05-03 DIAGNOSIS — I63.9 ACUTE ISCHEMIC STROKE (H): ICD-10-CM

## 2025-05-03 DIAGNOSIS — G93.40 ENCEPHALOPATHY: ICD-10-CM

## 2025-05-03 DIAGNOSIS — R56.9 SEIZURE (H): Primary | ICD-10-CM

## 2025-05-03 DIAGNOSIS — R52 PAIN: ICD-10-CM

## 2025-05-03 DIAGNOSIS — R41.9 COGNITIVE COMPLAINTS: ICD-10-CM

## 2025-05-03 DIAGNOSIS — J69.0 ASPIRATION PNEUMONITIS (H): ICD-10-CM

## 2025-05-03 DIAGNOSIS — I63.9 CEREBROVASCULAR ACCIDENT (CVA), UNSPECIFIED MECHANISM (H): ICD-10-CM

## 2025-05-03 LAB
ALBUMIN UR-MCNC: 30 MG/DL
ANION GAP SERPL CALCULATED.3IONS-SCNC: 16 MMOL/L (ref 7–15)
APPEARANCE UR: CLEAR
ATRIAL RATE - MUSE: 110 BPM
BASOPHILS # BLD AUTO: 0 10E3/UL (ref 0–0.2)
BASOPHILS NFR BLD AUTO: 0 %
BILIRUB UR QL STRIP: NEGATIVE
BUN SERPL-MCNC: 27.9 MG/DL (ref 8–23)
CALCIUM SERPL-MCNC: 9.2 MG/DL (ref 8.8–10.4)
CHLORIDE SERPL-SCNC: 106 MMOL/L (ref 98–107)
COLOR UR AUTO: COLORLESS
CREAT SERPL-MCNC: 1.65 MG/DL (ref 0.51–0.95)
DIASTOLIC BLOOD PRESSURE - MUSE: 94 MMHG
EGFRCR SERPLBLD CKD-EPI 2021: 32 ML/MIN/1.73M2
EOSINOPHIL # BLD AUTO: 0 10E3/UL (ref 0–0.7)
EOSINOPHIL NFR BLD AUTO: 0 %
ERYTHROCYTE [DISTWIDTH] IN BLOOD BY AUTOMATED COUNT: 14.4 % (ref 10–15)
ETHANOL SERPL-MCNC: <0.01 G/DL
FLUAV RNA SPEC QL NAA+PROBE: NEGATIVE
FLUBV RNA RESP QL NAA+PROBE: NEGATIVE
GLUCOSE SERPL-MCNC: 148 MG/DL (ref 70–99)
GLUCOSE UR STRIP-MCNC: NEGATIVE MG/DL
HCO3 SERPL-SCNC: 18 MMOL/L (ref 22–29)
HCT VFR BLD AUTO: 35.8 % (ref 35–47)
HGB BLD-MCNC: 11.7 G/DL (ref 11.7–15.7)
HGB UR QL STRIP: ABNORMAL
IMM GRANULOCYTES # BLD: 0 10E3/UL
IMM GRANULOCYTES NFR BLD: 0 %
INTERPRETATION ECG - MUSE: NORMAL
KETONES UR STRIP-MCNC: NEGATIVE MG/DL
LEUKOCYTE ESTERASE UR QL STRIP: NEGATIVE
LYMPHOCYTES # BLD AUTO: 0.5 10E3/UL (ref 0.8–5.3)
LYMPHOCYTES NFR BLD AUTO: 4 %
MCH RBC QN AUTO: 27.1 PG (ref 26.5–33)
MCHC RBC AUTO-ENTMCNC: 32.7 G/DL (ref 31.5–36.5)
MCV RBC AUTO: 83 FL (ref 78–100)
MONOCYTES # BLD AUTO: 0.2 10E3/UL (ref 0–1.3)
MONOCYTES NFR BLD AUTO: 2 %
NEUTROPHILS # BLD AUTO: 12 10E3/UL (ref 1.6–8.3)
NEUTROPHILS NFR BLD AUTO: 94 %
NITRATE UR QL: NEGATIVE
NRBC # BLD AUTO: 0 10E3/UL
NRBC BLD AUTO-RTO: 0 /100
P AXIS - MUSE: 76 DEGREES
PH UR STRIP: 6.5 [PH] (ref 5–7)
PLATELET # BLD AUTO: 230 10E3/UL (ref 150–450)
POTASSIUM SERPL-SCNC: 4.4 MMOL/L (ref 3.4–5.3)
PR INTERVAL - MUSE: 150 MS
QRS DURATION - MUSE: 82 MS
QT - MUSE: 314 MS
QTC - MUSE: 424 MS
R AXIS - MUSE: -31 DEGREES
RBC # BLD AUTO: 4.32 10E6/UL (ref 3.8–5.2)
RBC URINE: 6 /HPF
RSV RNA SPEC NAA+PROBE: NEGATIVE
SARS-COV-2 RNA RESP QL NAA+PROBE: NEGATIVE
SODIUM SERPL-SCNC: 140 MMOL/L (ref 135–145)
SP GR UR STRIP: 1.01 (ref 1–1.03)
SQUAMOUS EPITHELIAL: <1 /HPF
SYSTOLIC BLOOD PRESSURE - MUSE: 138 MMHG
T AXIS - MUSE: 82 DEGREES
UROBILINOGEN UR STRIP-MCNC: NORMAL MG/DL
VENTRICULAR RATE- MUSE: 110 BPM
WBC # BLD AUTO: 12.8 10E3/UL (ref 4–11)
WBC URINE: 1 /HPF

## 2025-05-03 PROCEDURE — 250N000013 HC RX MED GY IP 250 OP 250 PS 637: Performed by: EMERGENCY MEDICINE

## 2025-05-03 PROCEDURE — 96375 TX/PRO/DX INJ NEW DRUG ADDON: CPT

## 2025-05-03 PROCEDURE — 250N000011 HC RX IP 250 OP 636: Performed by: EMERGENCY MEDICINE

## 2025-05-03 PROCEDURE — 99285 EMERGENCY DEPT VISIT HI MDM: CPT | Mod: 25

## 2025-05-03 PROCEDURE — 80048 BASIC METABOLIC PNL TOTAL CA: CPT | Performed by: EMERGENCY MEDICINE

## 2025-05-03 PROCEDURE — 82077 ASSAY SPEC XCP UR&BREATH IA: CPT | Performed by: EMERGENCY MEDICINE

## 2025-05-03 PROCEDURE — 87637 SARSCOV2&INF A&B&RSV AMP PRB: CPT | Performed by: EMERGENCY MEDICINE

## 2025-05-03 PROCEDURE — 258N000003 HC RX IP 258 OP 636: Performed by: EMERGENCY MEDICINE

## 2025-05-03 PROCEDURE — 81001 URINALYSIS AUTO W/SCOPE: CPT | Performed by: EMERGENCY MEDICINE

## 2025-05-03 PROCEDURE — 93005 ELECTROCARDIOGRAM TRACING: CPT | Performed by: EMERGENCY MEDICINE

## 2025-05-03 PROCEDURE — 85025 COMPLETE CBC W/AUTO DIFF WBC: CPT | Performed by: EMERGENCY MEDICINE

## 2025-05-03 PROCEDURE — 96361 HYDRATE IV INFUSION ADD-ON: CPT

## 2025-05-03 PROCEDURE — 87040 BLOOD CULTURE FOR BACTERIA: CPT | Performed by: EMERGENCY MEDICINE

## 2025-05-03 PROCEDURE — 96365 THER/PROPH/DIAG IV INF INIT: CPT

## 2025-05-03 PROCEDURE — 71046 X-RAY EXAM CHEST 2 VIEWS: CPT

## 2025-05-03 PROCEDURE — 36415 COLL VENOUS BLD VENIPUNCTURE: CPT | Performed by: EMERGENCY MEDICINE

## 2025-05-03 PROCEDURE — 99223 1ST HOSP IP/OBS HIGH 75: CPT | Performed by: INTERNAL MEDICINE

## 2025-05-03 PROCEDURE — 70450 CT HEAD/BRAIN W/O DYE: CPT

## 2025-05-03 RX ORDER — LEVETIRACETAM 500 MG/1
500 TABLET ORAL 2 TIMES DAILY
Qty: 60 TABLET | Refills: 0 | Status: ON HOLD | OUTPATIENT
Start: 2025-05-03 | End: 2025-05-06

## 2025-05-03 RX ORDER — CEFTRIAXONE 2 G/1
2 INJECTION, POWDER, FOR SOLUTION INTRAMUSCULAR; INTRAVENOUS ONCE
Status: DISCONTINUED | OUTPATIENT
Start: 2025-05-03 | End: 2025-05-03

## 2025-05-03 RX ORDER — ONDANSETRON 2 MG/ML
4 INJECTION INTRAMUSCULAR; INTRAVENOUS ONCE
Status: COMPLETED | OUTPATIENT
Start: 2025-05-03 | End: 2025-05-03

## 2025-05-03 RX ORDER — ACETAMINOPHEN 325 MG/1
325 TABLET ORAL ONCE
Status: COMPLETED | OUTPATIENT
Start: 2025-05-03 | End: 2025-05-03

## 2025-05-03 RX ORDER — LIDOCAINE 40 MG/G
CREAM TOPICAL
Status: DISCONTINUED | OUTPATIENT
Start: 2025-05-03 | End: 2025-05-03 | Stop reason: HOSPADM

## 2025-05-03 RX ORDER — PIPERACILLIN SODIUM, TAZOBACTAM SODIUM 4; .5 G/20ML; G/20ML
4.5 INJECTION, POWDER, LYOPHILIZED, FOR SOLUTION INTRAVENOUS ONCE
Status: COMPLETED | OUTPATIENT
Start: 2025-05-03 | End: 2025-05-03

## 2025-05-03 RX ORDER — ACETAMINOPHEN 325 MG/1
975 TABLET ORAL ONCE
Status: COMPLETED | OUTPATIENT
Start: 2025-05-03 | End: 2025-05-03

## 2025-05-03 RX ORDER — ONDANSETRON 2 MG/ML
8 INJECTION INTRAMUSCULAR; INTRAVENOUS ONCE
Status: DISCONTINUED | OUTPATIENT
Start: 2025-05-03 | End: 2025-05-03

## 2025-05-03 RX ADMIN — ACETAMINOPHEN 975 MG: 325 TABLET, FILM COATED ORAL at 17:18

## 2025-05-03 RX ADMIN — ONDANSETRON 4 MG: 2 INJECTION, SOLUTION INTRAMUSCULAR; INTRAVENOUS at 11:36

## 2025-05-03 RX ADMIN — SODIUM CHLORIDE 1000 MG: 9 INJECTION, SOLUTION INTRAVENOUS at 11:53

## 2025-05-03 RX ADMIN — ACETAMINOPHEN 325 MG: 325 TABLET, FILM COATED ORAL at 18:43

## 2025-05-03 RX ADMIN — PIPERACILLIN AND TAZOBACTAM 4.5 G: 4; .5 INJECTION, POWDER, FOR SOLUTION INTRAVENOUS at 18:57

## 2025-05-03 RX ADMIN — SODIUM CHLORIDE 1000 ML: 0.9 INJECTION, SOLUTION INTRAVENOUS at 11:56

## 2025-05-03 ASSESSMENT — ACTIVITIES OF DAILY LIVING (ADL)
ADLS_ACUITY_SCORE: 54

## 2025-05-03 ASSESSMENT — COLUMBIA-SUICIDE SEVERITY RATING SCALE - C-SSRS
1. IN THE PAST MONTH, HAVE YOU WISHED YOU WERE DEAD OR WISHED YOU COULD GO TO SLEEP AND NOT WAKE UP?: NO
6. HAVE YOU EVER DONE ANYTHING, STARTED TO DO ANYTHING, OR PREPARED TO DO ANYTHING TO END YOUR LIFE?: NO
2. HAVE YOU ACTUALLY HAD ANY THOUGHTS OF KILLING YOURSELF IN THE PAST MONTH?: NO

## 2025-05-03 NOTE — ED NOTES
Ambulated pt with gait belt. Pt walks slow and steady. She states she feels slightly off balance but is not dizzy or lightheaded. MD notified.

## 2025-05-03 NOTE — ED NOTES
Report given to JUAN Kern. Received phone call at approx 1pm from daughter Alice [phone] who stated she or sister would be here early this afternoon.

## 2025-05-03 NOTE — ED TRIAGE NOTES
Pt arrives via [facility] from home for altered mental status and likely seizure. Hx of seizure and hasn't been taking keppra since January. Pt found by daughter unresponsive sitting by bed with vomit around her. EMS reports on arrival pt wasn't talking but now mental status improved. Pt noted to need frequent re-direction to follow commands. Pt alert.     Triage Assessment (Adult)       Row Name 05/03/25 1122          Triage Assessment    Airway WDL WDL        Respiratory WDL    Respiratory WDL rhythm/pattern;cough     Rhythm/Pattern, Respiratory tachypneic     Cough Frequency frequent     Cough Type congested;no productive sputum        Skin Circulation/Temperature WDL    Skin Circulation/Temperature WDL WDL        Cardiac WDL    Cardiac WDL X;rhythm     Pulse Rate & Regularity tachycardic        Cognitive/Neuro/Behavioral WDL    Cognitive/Neuro/Behavioral WDL X     Level of Consciousness intermittent confusion     Arousal Level opens eyes spontaneously     Orientation oriented x 4;other (see comments)  needing frequent re-direction for following commands     Speech clear;spontaneous;logical     Mood/Behavior restless;uncooperative;cooperative        [place] Coma Scale    Best Eye Response 4-->(E4) spontaneous     Best Motor Response 6-->(M6) obeys commands     Best Verbal Response 5-->(V5) oriented     [place] Coma Scale Score 15

## 2025-05-03 NOTE — ED NOTES
[facility] ED Handoff Report    ED Chief Complaint: seizure    ED Diagnosis:  (R56.9) Seizure (H)  Comment:    Plan:meds and obs    (Z91.199) Current nonadherence to medical treatment  Comment:    Plan:      (G93.40) Encephalopathy  Comment:    Plan:      (N17.9) ANNIKA (acute kidney injury)  Comment:    Plan:         PMH:  No past medical history on file.     Code Status:  Prior     Falls Risk: No Band: Not applicable    Current Living Situation/Residence: lives with their son or daughter     Elimination Status: Continent: Yes     Activity Level: SBA w/ walker    Patient's Preferred Language:  English     Needed: No    Vital Signs:  BP (!) 142/71   Pulse 98   Temp 101.3  F (38.5  C)   Resp 20   Wt 52.6 kg (116 lb)   SpO2 95%   BMI 20.88 kg/m       Cardiac Rhythm: NSR    Pain Score: 0/10    Is the Patient Confused:  No    Last Food or Drink: 05/03/25 at 0800    Assessment and Plan of Care:    Seizure (H)     2. Current nonadherence to medical treatment             ED COURSE & MEDICAL DECISION MAKING:    Pertinent Labs & Imaging studies reviewed. (See chart for details)  75 year old female with history of HTN, CKD, seizure disorder on Keppra though per fill history has at least been off of this for 3 weeks if not since January per EMS report, who presents to the Emergency Department for evaluation of altered mental status.  Found by daughter sitting on side of the bed, altered with vomitus around her.  Initially was nonverbal per EMS, but now is talking and redirectable.  Slightly tachycardic.  Has a bite to the left side of the tongue.  Symptoms seem most consistent with seizure and postictal phase.  Likely related to medication nonadherence.  Differential includes ICH, electrolyte abnormality.  She did have vomitus and is slightly tachycardic though not tachypneic or hypoxic is coughing with some frequency concern for aspiration.     Patient met by myself on EMS arrival, placed on monitor, IV  established and blood obtained.  Twelve-lead EKG shows sinus tachycardia.  Given 1 g of Keppra, 1 L normal saline bolus and 4 mg Zofran.  CBC, BMP, blood alcohol level notable for mild leukocytosis, anion gap acidosis consistent with likely recent seizure activity.  Creatinine is slightly elevated at 1.6 compared to her previous.  CT head unremarkable.  Chest x-ray unremarkable.   Urinalysis without evidence of infection.  At time of this dictation 2.5 into her ED stay she is still drowsy and seemingly postictal.  She did have a repeat temperature that was 100.1 but is still technically afebrile.  Family is reportedly en route, but are not present at this time.  Given clinical picture I think would be reasonable to allow her additional time for mental status normalization after what is very likely postictal phase.  If mental status is not improving, or should she spike a temperature would be resource proceed with lumbar puncture and admission.  Signed out to oncoming physician awaiting same.       Tests Performed: Done: Labs and Imaging    Treatments Provided:  med AND FLUIDS    Family Dynamics/Concerns: No    Belongings Checklist Done and Signed by Patient: No    Belongings Sent with Patient:      Boarding medications sent with patient:      Additional Information:      RN: ANNIKA Ryan   5/3/2025 5:55 PM

## 2025-05-03 NOTE — ED PROVIDER NOTES
EMERGENCY DEPARTMENT ENCOUNTER      NAME: Bita Stephenson  AGE: 75 year old female  YOB: 1950  MRN: [id number]  EVALUATION DATE & TIME: 5/3/2025 11:14 AM    PCP: Rebekah Cain    ED PROVIDER: Ofe Robbins MD    Chief Complaint   Patient presents with    Seizures    Altered Mental Status         FINAL IMPRESSION:  1. Seizure (H)    2. Current nonadherence to medical treatment          ED COURSE & MEDICAL DECISION MAKING:    Pertinent Labs & Imaging studies reviewed. (See chart for details)  75 year old female with history of HTN, CKD, seizure disorder on Keppra though per fill history has at least been off of this for 3 weeks if not since January per EMS report, who presents to the Emergency Department for evaluation of altered mental status.  Found by daughter sitting on side of the bed, altered with vomitus around her.  Initially was nonverbal per EMS, but now is talking and redirectable.  Slightly tachycardic.  Has a bite to the left side of the tongue.  Symptoms seem most consistent with seizure and postictal phase.  Likely related to medication nonadherence.  Differential includes ICH, electrolyte abnormality.  She did have vomitus and is slightly tachycardic though not tachypneic or hypoxic is coughing with some frequency concern for aspiration.    Patient met by myself on EMS arrival, placed on monitor, IV established and blood obtained.  Twelve-lead EKG shows sinus tachycardia.  Given 1 g of Keppra, 1 L normal saline bolus and 4 mg Zofran.  CBC, BMP, blood alcohol level notable for mild leukocytosis, anion gap acidosis consistent with likely recent seizure activity.  Creatinine is slightly elevated at 1.6 compared to her previous.  CT head unremarkable.  Chest x-ray unremarkable.   Urinalysis without evidence of infection.  At time of this dictation 2.5 into her ED stay she is still drowsy and seemingly postictal.  She did have a repeat temperature that was 100.1 but is still  technically afebrile.  Family is reportedly en route, but are not present at this time.  Given clinical picture I think would be reasonable to allow her additional time for mental status normalization after what is very likely postictal phase.  If mental status is not improving, or should she spike a temperature would be resource proceed with lumbar puncture and admission.  Signed out to oncoming physician awaiting same.      ED Course as of 05/03/25 1351   Sat May 03, 2025   1114 Met with patient for initial interview and exam. We discussed the plans for the emergency department visit and diagnostic testing.    1133 vomiting   1140 WBC(!): 12.8   1153 Creatinine(!): 1.65  reinaldo   1153 Basic metabolic panel(!)  +AGMA consistent w likely recent seizure   1245 Chest XR,  PA & LAT  Chest x-ray independently interpreted by myself without visualized aspiration   1246 CT Head w/o Contrast  CT head interpreted by myself without visualized ICH       Medical Decision Making  I obtained history from EMS  I reviewed the EMR: Outpatient Record: 8/27/2024 clinic visit, pharmacy fill history  Admission considered. Patient was signed out to the oncoming physician, disposition pending.    MIPS (CTPE, Dental pain, Swan, Sinusitis, Asthma/COPD, Head Trauma): Not Applicable    SEPSIS: None          At the conclusion of the encounter I discussed the results of all of the tests and the disposition. The questions were answered. The patient or family acknowledged understanding and was agreeable with the care plan.    MEDICATIONS GIVEN IN THE EMERGENCY:  Medications   lidocaine 1 % 0.1-1 mL (has no administration in time range)   lidocaine (LMX4) cream (has no administration in time range)   sodium chloride (PF) 0.9% PF flush 3 mL (3 mLs Intracatheter Not Given 5/3/25 1300)   sodium chloride (PF) 0.9% PF flush 3 mL (has no administration in time range)   levETIRAcetam (KEPPRA) 1,000 mg in sodium chloride 0.9 % 120 mL intermittent infusion  (0 mg Intravenous Stopped 5/3/25 1208)   ondansetron (ZOFRAN) injection 4 mg (4 mg Intravenous $Given 5/3/25 1136)   sodium chloride 0.9% BOLUS 1,000 mL (1,000 mLs Intravenous $New Bag 5/3/25 1156)       NEW PRESCRIPTIONS STARTED AT TODAY'S ER VISIT  New Prescriptions    No medications on file          =================================================================    HPI    Patient information was obtained from: Patient and EMS    Use of Intrepreter: N/A        Bita Stephenson is a 75 year old female with pertinent medical history of encephalopathy, stage 3 CKD, UTI, hypertension, and non-compliance with medication regimen who presents for evaluation of seizures and altered mental status.     Patient History is Limited Due To Altered Mental Status.    The patient arrives via EMS after her daughter found her unresponsive sitting by the bed with vomit around her. EMS states that she was not talking upon arrival, but her mental status has  now improved in the ED. She has a history of seizures, but has not been taking her Keppra since January. Based on the report, she also has a history of UTI and presents similarly. Per patient, she is disoriented, but is able to endorse fatigue and denies any associated pain.     Per Chart Review, the patient had a 90 day Keppra prescription refilled on 1/13/25. In theory, she should only be off of her Keppra medications for 2-3 weeks, as opposed to being off of Keppra since January per the EMS report.     PAST MEDICAL HISTORY:  No past medical history on file.    PAST SURGICAL HISTORY:  Past Surgical History:   Procedure Laterality Date    NO PAST SURGERIES         CURRENT MEDICATIONS:    Prior to Admission Medications   Prescriptions Last Dose Informant Patient Reported? Taking?   amLODIPine (NORVASC) 10 MG tablet  Daughter Yes No   Sig: Take 10 mg by mouth daily    famotidine (PEPCID) 20 MG tablet  Daughter Yes No   Sig: Take 20 mg by mouth 2 times daily as needed   levETIRAcetam  "(KEPPRA) 500 MG tablet   No No   Sig: Take 1 tablet (500 mg) by mouth 2 times daily   melatonin 3 MG tablet  Daughter Yes No   Sig: Take 3 mg by mouth nightly as needed for sleep      Facility-Administered Medications: None       ALLERGIES:  Allergies   Allergen Reactions    Paroxetine Itching     tingling    Sulfa Antibiotics      Other reaction(s): *Unknown    Valium [Diazepam] Itching       FAMILY HISTORY:  Family History   Problem Relation Age of Onset    Sudden Death Mother 62.00         at home, no autopsy    Heart Disease Mother         she had an unspecified type of heart surgery one month before her death    No Known Problems Son     No Known Problems Son     Diabetes Type 2  Son     No Known Problems Daughter     No Known Problems Daughter        SOCIAL HISTORY:  Social History     Tobacco Use    Smoking status: Former     Current packs/day: 0.00     Average packs/day: 1.5 packs/day for 28.0 years (42.0 ttl pk-yrs)     Types: Cigarettes     Start date: 1973     Quit date: 2001     Years since quittin.2    Smokeless tobacco: Never   Substance Use Topics    Alcohol use: No     Comment: Alcoholic Drinks/day: quit drinking in     Drug use: Yes     Types: Marijuana     Comment: Drug use: \"occasional\"        VITALS:  Patient Vitals for the past 24 hrs:   BP Temp Temp src Pulse Resp SpO2 Weight   25 1315 (!) 159/83 100.1  F (37.8  C) Oral 92 17 95 % --   25 1300 (!) 160/80 -- -- 92 17 96 % --   25 1245 (!) 161/77 -- -- 114 -- -- --   25 1215 -- -- -- 100 18 94 % --   25 1145 (!) 167/79 -- -- 100 14 94 % --   25 1131 (!) 161/76 -- -- 112 -- 98 % --   25 1125 -- -- -- 112 21 98 % --   25 1116 (!) 138/94 99.2  F (37.3  C) Oral 118 20 97 % 52.6 kg (116 lb)       PHYSICAL EXAM    General Appearance: Well-nourished, Restless, Disorientated but redirectable, Unable to tell me what is going on or where  Head:  Normocephalic, atraumatic  Eyes:  TOSHIA, " conjunctiva/corneas clear  ENT:  Lips and mucosa normal; membranes are moist without pallor, Bite to left side of tongue  Neck:  Supple, nuchal rigidity or meningismus  Cardio: Tachycardic in the 110s, regular rhythm  Pulm:  No respiratory distress, clear to auscultation bilaterally  Back:  No midline tenderness to palpation, no paraspinal tenderness  Abdomen:  Soft, non-tender, non distended,no rebound or guarding.  Extremities: Moves all extremities normally  Skin:  Skin warm, dry, no rashes  Neuro:  Moving all extremities, no gross sensory defects, Alert to self only, Cannot tell me where she is but is able to confirm that she is in the hospital given a list of options, Unable to tell which hospital     RADIOLOGY/LABS:  Reviewed all pertinent imaging. Please see official radiology report. All pertinent labs reviewed and interpreted.    Results for orders placed or performed during the hospital encounter of 05/03/25   CT Head w/o Contrast    Impression    IMPRESSION:    1.  No evidence of acute intracranial hemorrhage or mass effect.   Chest XR,  PA & LAT    Impression    IMPRESSION: No focal consolidation. Questionable trace right pleural fluid versus pleural thickening. No pneumothorax. Similar cardiomediastinal silhouette and aortic calcification.   Basic metabolic panel   Result Value Ref Range    Sodium 140 135 - 145 mmol/L    Potassium 4.4 3.4 - 5.3 mmol/L    Chloride 106 98 - 107 mmol/L    Carbon Dioxide (CO2) 18 (L) 22 - 29 mmol/L    Anion Gap 16 (H) 7 - 15 mmol/L    Urea Nitrogen 27.9 (H) 8.0 - 23.0 mg/dL    Creatinine 1.65 (H) 0.51 - 0.95 mg/dL    GFR Estimate 32 (L) >60 mL/min/1.73m2    Calcium 9.2 8.8 - 10.4 mg/dL    Glucose 148 (H) 70 - 99 mg/dL   Result Value Ref Range    Alcohol ethyl <0.01 <=0.01 g/dL   UA with Microscopic reflex to Culture    Specimen: Urine, Catheter   Result Value Ref Range    Color Urine Colorless Colorless, Straw, Light Yellow, Yellow    Appearance Urine Clear Clear    Glucose  Urine Negative Negative mg/dL    Bilirubin Urine Negative Negative    Ketones Urine Negative Negative mg/dL    Specific Gravity Urine 1.009 1.001 - 1.030    Blood Urine 0.1 mg/dL (A) Negative    pH Urine 6.5 5.0 - 7.0    Protein Albumin Urine 30 (A) Negative mg/dL    Urobilinogen Urine Normal Normal mg/dL    Nitrite Urine Negative Negative    Leukocyte Esterase Urine Negative Negative    RBC Urine 6 (H) <=2 /HPF    WBC Urine 1 <=5 /HPF    Squamous Epithelials Urine <1 <=1 /HPF   CBC with platelets and differential   Result Value Ref Range    WBC Count 12.8 (H) 4.0 - 11.0 10e3/uL    RBC Count 4.32 3.80 - 5.20 10e6/uL    Hemoglobin 11.7 11.7 - 15.7 g/dL    Hematocrit 35.8 35.0 - 47.0 %    MCV 83 78 - 100 fL    MCH 27.1 26.5 - 33.0 pg    MCHC 32.7 31.5 - 36.5 g/dL    RDW 14.4 10.0 - 15.0 %    Platelet Count 230 150 - 450 10e3/uL    % Neutrophils 94 %    % Lymphocytes 4 %    % Monocytes 2 %    % Eosinophils 0 %    % Basophils 0 %    % Immature Granulocytes 0 %    NRBCs per 100 WBC 0 <1 /100    Absolute Neutrophils 12.0 (H) 1.6 - 8.3 10e3/uL    Absolute Lymphocytes 0.5 (L) 0.8 - 5.3 10e3/uL    Absolute Monocytes 0.2 0.0 - 1.3 10e3/uL    Absolute Eosinophils 0.0 0.0 - 0.7 10e3/uL    Absolute Basophils 0.0 0.0 - 0.2 10e3/uL    Absolute Immature Granulocytes 0.0 <=0.4 10e3/uL    Absolute NRBCs 0.0 10e3/uL       EKG:  Performed at: 1125, [facility] Emergency Room    Impression: Sinus tachycardia, Left axis deviation, Septal infarct age undetermined, Abnormal ECG    Rate: 110 BPM  Rhythm: Sinus Tachycardia  Axis: 76, -31, 82  NJ Interval: 150 ms  QRS Interval: 82 ms  QTc Interval: 314/424 ms  ST Changes: None   Comparison: No previous ECGs available.  I have independently reviewed and interpreted the EKG(s) documented above.      The creation of this record is based on the scribe s observations of the work being performed by Ofe Robbins MD and the provider s statements to them. It was created  on her behalf by Miguel Ángel Rodriguez, a trained medical scribe. This document has been checked and approved by the attending provider.    Ofe Robbins MD  Emergency Medicine  [facility] EMERGENCY ROOM  [address] [id number] [phone]  Dept: [phone]     Ofe Robbins MD  05/03/25 6728

## 2025-05-03 NOTE — ED NOTES
Bed: [facility]ED-02  Expected date:   Expected time:   Means of arrival:   Comments:  LV 5min, 75F possible sz

## 2025-05-04 ENCOUNTER — APPOINTMENT (OUTPATIENT)
Dept: MRI IMAGING | Facility: CLINIC | Age: 75
End: 2025-05-04
Attending: PSYCHIATRY & NEUROLOGY
Payer: COMMERCIAL

## 2025-05-04 ENCOUNTER — HOSPITAL ENCOUNTER (OUTPATIENT)
Dept: NEUROLOGY | Facility: CLINIC | Age: 75
Setting detail: OBSERVATION
Discharge: HOME | End: 2025-05-04
Attending: INTERNAL MEDICINE | Admitting: INTERNAL MEDICINE
Payer: COMMERCIAL

## 2025-05-04 PROBLEM — R56.9 SEIZURE (H): Status: ACTIVE | Noted: 2025-05-04

## 2025-05-04 LAB
ALBUMIN SERPL BCG-MCNC: 3.7 G/DL (ref 3.5–5.2)
ALP SERPL-CCNC: 63 U/L (ref 40–150)
ALT SERPL W P-5'-P-CCNC: 13 U/L (ref 0–50)
ANION GAP SERPL CALCULATED.3IONS-SCNC: 12 MMOL/L (ref 7–15)
AST SERPL W P-5'-P-CCNC: 21 U/L (ref 0–45)
BILIRUB SERPL-MCNC: 0.3 MG/DL
BUN SERPL-MCNC: 24.2 MG/DL (ref 8–23)
CALCIUM SERPL-MCNC: 8.4 MG/DL (ref 8.8–10.4)
CHLORIDE SERPL-SCNC: 104 MMOL/L (ref 98–107)
CREAT SERPL-MCNC: 1.51 MG/DL (ref 0.51–0.95)
EGFRCR SERPLBLD CKD-EPI 2021: 35 ML/MIN/1.73M2
ERYTHROCYTE [DISTWIDTH] IN BLOOD BY AUTOMATED COUNT: 14.5 % (ref 10–15)
GLUCOSE SERPL-MCNC: 105 MG/DL (ref 70–99)
HCO3 SERPL-SCNC: 21 MMOL/L (ref 22–29)
HCT VFR BLD AUTO: 34 % (ref 35–47)
HGB BLD-MCNC: 11.1 G/DL (ref 11.7–15.7)
HIV 1+2 AB+HIV1 P24 AG SERPL QL IA: NONREACTIVE
MAGNESIUM SERPL-MCNC: 2.1 MG/DL (ref 1.7–2.3)
MCH RBC QN AUTO: 27.5 PG (ref 26.5–33)
MCHC RBC AUTO-ENTMCNC: 32.6 G/DL (ref 31.5–36.5)
MCV RBC AUTO: 84 FL (ref 78–100)
PLATELET # BLD AUTO: 194 10E3/UL (ref 150–450)
POTASSIUM SERPL-SCNC: 4.2 MMOL/L (ref 3.4–5.3)
PROT SERPL-MCNC: 6.2 G/DL (ref 6.4–8.3)
RBC # BLD AUTO: 4.04 10E6/UL (ref 3.8–5.2)
SODIUM SERPL-SCNC: 137 MMOL/L (ref 135–145)
T PALLIDUM AB SER QL: NONREACTIVE
TSH SERPL DL<=0.005 MIU/L-ACNC: 2.02 UIU/ML (ref 0.3–4.2)
VIT B12 SERPL-MCNC: 278 PG/ML (ref 232–1245)
WBC # BLD AUTO: 8.3 10E3/UL (ref 4–11)

## 2025-05-04 PROCEDURE — 80053 COMPREHEN METABOLIC PANEL: CPT | Performed by: INTERNAL MEDICINE

## 2025-05-04 PROCEDURE — 70553 MRI BRAIN STEM W/O & W/DYE: CPT

## 2025-05-04 PROCEDURE — A9585 GADOBUTROL INJECTION: HCPCS | Performed by: PSYCHIATRY & NEUROLOGY

## 2025-05-04 PROCEDURE — 255N000002 HC RX 255 OP 636: Performed by: PSYCHIATRY & NEUROLOGY

## 2025-05-04 PROCEDURE — 36415 COLL VENOUS BLD VENIPUNCTURE: CPT | Performed by: PSYCHIATRY & NEUROLOGY

## 2025-05-04 PROCEDURE — 87389 HIV-1 AG W/HIV-1&-2 AB AG IA: CPT | Performed by: INTERNAL MEDICINE

## 2025-05-04 PROCEDURE — 99222 1ST HOSP IP/OBS MODERATE 55: CPT | Mod: 25 | Performed by: PSYCHIATRY & NEUROLOGY

## 2025-05-04 PROCEDURE — 250N000013 HC RX MED GY IP 250 OP 250 PS 637: Performed by: HOSPITALIST

## 2025-05-04 PROCEDURE — 86780 TREPONEMA PALLIDUM: CPT | Performed by: INTERNAL MEDICINE

## 2025-05-04 PROCEDURE — G0378 HOSPITAL OBSERVATION PER HR: HCPCS

## 2025-05-04 PROCEDURE — 250N000013 HC RX MED GY IP 250 OP 250 PS 637: Performed by: INTERNAL MEDICINE

## 2025-05-04 PROCEDURE — 95816 EEG AWAKE AND DROWSY: CPT

## 2025-05-04 PROCEDURE — 84443 ASSAY THYROID STIM HORMONE: CPT | Performed by: INTERNAL MEDICINE

## 2025-05-04 PROCEDURE — 99232 SBSQ HOSP IP/OBS MODERATE 35: CPT | Performed by: HOSPITALIST

## 2025-05-04 PROCEDURE — 36415 COLL VENOUS BLD VENIPUNCTURE: CPT | Performed by: INTERNAL MEDICINE

## 2025-05-04 PROCEDURE — 95816 EEG AWAKE AND DROWSY: CPT | Mod: 26 | Performed by: PSYCHIATRY & NEUROLOGY

## 2025-05-04 PROCEDURE — 83735 ASSAY OF MAGNESIUM: CPT | Performed by: PSYCHIATRY & NEUROLOGY

## 2025-05-04 PROCEDURE — 82607 VITAMIN B-12: CPT | Performed by: INTERNAL MEDICINE

## 2025-05-04 PROCEDURE — 258N000003 HC RX IP 258 OP 636: Performed by: INTERNAL MEDICINE

## 2025-05-04 PROCEDURE — 85018 HEMOGLOBIN: CPT | Performed by: INTERNAL MEDICINE

## 2025-05-04 RX ORDER — ONDANSETRON 2 MG/ML
4 INJECTION INTRAMUSCULAR; INTRAVENOUS EVERY 6 HOURS PRN
Status: DISCONTINUED | OUTPATIENT
Start: 2025-05-04 | End: 2025-05-06 | Stop reason: HOSPADM

## 2025-05-04 RX ORDER — LORAZEPAM 2 MG/ML
1 INJECTION INTRAMUSCULAR EVERY 4 HOURS PRN
Status: DISCONTINUED | OUTPATIENT
Start: 2025-05-04 | End: 2025-05-06 | Stop reason: HOSPADM

## 2025-05-04 RX ORDER — UBIDECARENONE 75 MG
100 CAPSULE ORAL DAILY
Status: DISCONTINUED | OUTPATIENT
Start: 2025-05-04 | End: 2025-05-06 | Stop reason: HOSPADM

## 2025-05-04 RX ORDER — CEFDINIR 300 MG/1
300 CAPSULE ORAL EVERY 12 HOURS SCHEDULED
Status: DISCONTINUED | OUTPATIENT
Start: 2025-05-04 | End: 2025-05-05

## 2025-05-04 RX ORDER — GADOBUTROL 604.72 MG/ML
5 INJECTION INTRAVENOUS ONCE
Status: COMPLETED | OUTPATIENT
Start: 2025-05-04 | End: 2025-05-04

## 2025-05-04 RX ORDER — HYDRALAZINE HYDROCHLORIDE 20 MG/ML
10 INJECTION INTRAMUSCULAR; INTRAVENOUS EVERY 4 HOURS PRN
Status: DISCONTINUED | OUTPATIENT
Start: 2025-05-04 | End: 2025-05-06 | Stop reason: HOSPADM

## 2025-05-04 RX ORDER — ONDANSETRON 4 MG/1
4 TABLET, ORALLY DISINTEGRATING ORAL EVERY 6 HOURS PRN
Status: DISCONTINUED | OUTPATIENT
Start: 2025-05-04 | End: 2025-05-06 | Stop reason: HOSPADM

## 2025-05-04 RX ORDER — LIDOCAINE 40 MG/G
CREAM TOPICAL
Status: DISCONTINUED | OUTPATIENT
Start: 2025-05-04 | End: 2025-05-06 | Stop reason: HOSPADM

## 2025-05-04 RX ORDER — ACETAMINOPHEN 325 MG/1
650 TABLET ORAL EVERY 4 HOURS PRN
Status: DISCONTINUED | OUTPATIENT
Start: 2025-05-04 | End: 2025-05-06 | Stop reason: HOSPADM

## 2025-05-04 RX ORDER — POLYETHYLENE GLYCOL 3350 17 G/17G
17 POWDER, FOR SOLUTION ORAL 2 TIMES DAILY PRN
Status: DISCONTINUED | OUTPATIENT
Start: 2025-05-04 | End: 2025-05-06 | Stop reason: HOSPADM

## 2025-05-04 RX ORDER — ACETAMINOPHEN 650 MG/1
650 SUPPOSITORY RECTAL EVERY 4 HOURS PRN
Status: DISCONTINUED | OUTPATIENT
Start: 2025-05-04 | End: 2025-05-06 | Stop reason: HOSPADM

## 2025-05-04 RX ORDER — AMOXICILLIN 250 MG
2 CAPSULE ORAL 2 TIMES DAILY PRN
Status: DISCONTINUED | OUTPATIENT
Start: 2025-05-04 | End: 2025-05-06 | Stop reason: HOSPADM

## 2025-05-04 RX ORDER — LEVETIRACETAM 500 MG/1
500 TABLET ORAL 2 TIMES DAILY
Status: DISCONTINUED | OUTPATIENT
Start: 2025-05-04 | End: 2025-05-06 | Stop reason: HOSPADM

## 2025-05-04 RX ORDER — AZITHROMYCIN 250 MG/1
500 TABLET, FILM COATED ORAL ONCE
Status: COMPLETED | OUTPATIENT
Start: 2025-05-04 | End: 2025-05-04

## 2025-05-04 RX ORDER — AMOXICILLIN 250 MG
1 CAPSULE ORAL 2 TIMES DAILY PRN
Status: DISCONTINUED | OUTPATIENT
Start: 2025-05-04 | End: 2025-05-06 | Stop reason: HOSPADM

## 2025-05-04 RX ORDER — HYDRALAZINE HYDROCHLORIDE 10 MG/1
10 TABLET, FILM COATED ORAL EVERY 4 HOURS PRN
Status: DISCONTINUED | OUTPATIENT
Start: 2025-05-04 | End: 2025-05-06 | Stop reason: HOSPADM

## 2025-05-04 RX ORDER — AMLODIPINE BESYLATE 10 MG/1
10 TABLET ORAL DAILY
Status: DISCONTINUED | OUTPATIENT
Start: 2025-05-04 | End: 2025-05-06 | Stop reason: HOSPADM

## 2025-05-04 RX ORDER — AZITHROMYCIN 250 MG/1
250 TABLET, FILM COATED ORAL DAILY
Status: DISCONTINUED | OUTPATIENT
Start: 2025-05-05 | End: 2025-05-06 | Stop reason: HOSPADM

## 2025-05-04 RX ORDER — PROCHLORPERAZINE MALEATE 5 MG/1
5 TABLET ORAL EVERY 6 HOURS PRN
Status: DISCONTINUED | OUTPATIENT
Start: 2025-05-04 | End: 2025-05-06 | Stop reason: HOSPADM

## 2025-05-04 RX ORDER — BISACODYL 10 MG
10 SUPPOSITORY, RECTAL RECTAL DAILY PRN
Status: DISCONTINUED | OUTPATIENT
Start: 2025-05-04 | End: 2025-05-06 | Stop reason: HOSPADM

## 2025-05-04 RX ADMIN — LEVETIRACETAM 500 MG: 500 TABLET, FILM COATED ORAL at 08:31

## 2025-05-04 RX ADMIN — GADOBUTROL 5 ML: 604.72 INJECTION INTRAVENOUS at 23:24

## 2025-05-04 RX ADMIN — CEFDINIR 300 MG: 300 CAPSULE ORAL at 20:04

## 2025-05-04 RX ADMIN — SODIUM CHLORIDE, SODIUM LACTATE, POTASSIUM CHLORIDE, AND CALCIUM CHLORIDE 1000 ML: .6; .31; .03; .02 INJECTION, SOLUTION INTRAVENOUS at 01:16

## 2025-05-04 RX ADMIN — CEFDINIR 300 MG: 300 CAPSULE ORAL at 08:31

## 2025-05-04 RX ADMIN — AMLODIPINE BESYLATE 10 MG: 10 TABLET ORAL at 08:31

## 2025-05-04 RX ADMIN — LEVETIRACETAM 500 MG: 500 TABLET, FILM COATED ORAL at 01:37

## 2025-05-04 RX ADMIN — VITAM B12 100 MCG: 100 TAB at 13:10

## 2025-05-04 RX ADMIN — AZITHROMYCIN DIHYDRATE 500 MG: 250 TABLET ORAL at 01:37

## 2025-05-04 RX ADMIN — LEVETIRACETAM 500 MG: 500 TABLET, FILM COATED ORAL at 20:04

## 2025-05-04 ASSESSMENT — ACTIVITIES OF DAILY LIVING (ADL)
ADLS_ACUITY_SCORE: 62
ADLS_ACUITY_SCORE: 61
ADLS_ACUITY_SCORE: 62
ADLS_ACUITY_SCORE: 61
ADLS_ACUITY_SCORE: 56
ADLS_ACUITY_SCORE: 62
ADLS_ACUITY_SCORE: 56
ADLS_ACUITY_SCORE: 62
ADLS_ACUITY_SCORE: 62
ADLS_ACUITY_SCORE: 61
ADLS_ACUITY_SCORE: 62
ADLS_ACUITY_SCORE: 66
ADLS_ACUITY_SCORE: 62
ADLS_ACUITY_SCORE: 62
ADLS_ACUITY_SCORE: 66
ADLS_ACUITY_SCORE: 56
ADLS_ACUITY_SCORE: 66
ADLS_ACUITY_SCORE: 62

## 2025-05-04 NOTE — PLAN OF CARE
Goal Outcome Evaluation:      Plan of Care Reviewed With: patient    Overall Patient Progress: no changeOverall Patient Progress: no change     Reason for Admission: seizures, suspected aspiration pneumonia    Cognitive/Mentation: A/Ox 4  Neuros/CMS: Intact ex confusion, gen weakness  VS: VSS on RA. SBP <180. Afebrile.   /GI: Continent. Calls for BR. No BM this shift.   Pulmonary: LS diminshed. Infrequent dry cough.   Pain: denies.     Drains/Lines: L PIV SL.  Skin: intact.   Activity: SBA.  Diet: reg with thin liquids. Takes pills whole.     Therapies recs: tbd  Discharge: tbd    Aggression Stoplight Tool: green    End of shift summary: Very sleepy and withdrawn throughout shift. EEG completed this shift. Did not want to eat all shift. Only drinking water.

## 2025-05-04 NOTE — PHARMACY-ADMISSION MEDICATION HISTORY
Pharmacist Admission Medication History    Admission medication history is complete. The information provided in this note is only as accurate as the sources available at the time of the update.    Medication reconciliation/reorder completed by provider prior to medication history? No    Information Source(s): Patient and CareEverywhere/SureScripts ; daughter, Alice at     Pertinent Information: date of last levetiracetam unknow    Changes made to PTA medication list:  Added: none  Deleted: none  Changed: none      Allergies reviewed with patient and updates made in EHR: yes    Medications available for use during hospital stay: NONE.     Medication History Completed By: Uday Reyes [facility] 5/3/2025 9:03 PM    PTA Med List   Medication Sig Note Last Dose/Taking    amLODIPine (NORVASC) 10 MG tablet Take 10 mg by mouth daily   Unknown    levETIRAcetam (KEPPRA) 500 MG tablet Take 1 tablet (500 mg) by mouth 2 times daily.  Taking    levETIRAcetam (KEPPRA) 500 MG tablet Take 1 tablet (500 mg) by mouth 2 times daily 5/3/2025: Last fill January 2025 Unknown

## 2025-05-04 NOTE — PLAN OF CARE
Admission/Transfer from: [facility]  2 RN skin assessment completed. Yes  Significant findings include: n/a  WOC Nurse Consult Ordered? No

## 2025-05-04 NOTE — PROGRESS NOTES
[employer]    Medicine Progress Note - Hospitalist Service    Date of Admission:  5/3/2025    Assessment & Plan   Bita Stephenson is a 75 year old female with a history of seizures, memory impairment admitted on 5/3/2025. She presentd as a direct admission from [facility] emergency department after likely seizure event at home and subsequent postictal state.  Nonadherent to Keppra with known epilepsy.  Initial plan had been for discharge, but patient subsequently febrile with concern for aspiration pneumonia given initial presentation.    Epilepsy: History of generalized tonic-clonic as well as subclinical partial seizures.  Follows with (or at least had followed with) Dr Rowe of [facility] Neu[facility].  Seizure with postictal state: In the setting of Keppra nonadherence.  - Admitted to inpatient.  - Loaded with Keppra 1 g at [facility] prior to transfer.  - Continue Keppra 500 mg twice daily.  - Note that no Keppra level was drawn at outside hospital, but patient has not filled her Keppra prescription since January.  - Neurology consulted, appreciate their assistance.  - EEG ordered and pending.    Cognitive impairment, suspect undiagnosed dementia: Lives with her daughter, Alice, who has noted that patient has intact distant memories, but has struggled somewhat with recalling short-term events.  At times will forget if she ate a meal or not, recently could not recall that a niece's boyfriend had visited the house or who he was.  Highly suspicious for dementing illness, either vascular dementia or progressive dementia such as Alzheimer's.  Did not recall that she has had seizures in the past despite requiring intubation and hospitalization in 2022.  Mini cog of 3 in August 2024 through primary care team, but declined a formal cognitive evaluation at that time.  Even at her Medicare visit in 2023 she had some memory complaints.  - OT consultation for cognitive evaluation.  - Patient currently lives  with her daughter, Alice.  Pending degree of supervision needed and able to be provided by family, may need social work consultation for disposition planning.  - Recommend assistance with medication set up/monitoring.  Hopefully this can be performed with family assistance at home.  - Outpatient neurocognitive assessment if patient willing to complete.  - B12 low end of normal, will start supplement.  - HIV non-reactive.  TSH within normal limits. Treponema antibodies with reflex RPR pending.  - Neurology consulted as above.    Suspected aspiration pneumonia: Patient with frequent coughing, found postictal with emesis.  No hypoxia and no infiltrate on chest x-ray, but also possible ANNIKA.  Febrile to 102.8 Fahrenheit at outside emergency department, leukocytosis of 12.8.  Aspiration pneumonitis could have resulted in similar findings, but typically would anticipate fever to be more proximal to aspiration event rather than 8 hours after aspiration.  -Received an initial dose of Zosyn in outside emergency department, not continued at [facility], treating here with cefdinir and azithromycin.  - Blood cultures x 2 pending from outside emergency department.    Stage IIIb chronic kidney disease: Creatinine of 1.65.  Was 1.15 in 2022, but by review of outside records more recently has been in the 1.8-2 range through her primary system.  Recent renal ultrasound from 2023 demonstrates small and mildly echogenic kidneys.  As such, current creatinine could represent progression of chronic kidney disease.  Reports eating and drinking normally in the preceding days  - Received 1 L lactated ringer bolus at time of admission.  - Creatinine improved to 1.51 on 5/4/25.  - BMP in AM.    Suspected moderate to severe protein calorie malnutrition: Daughter describes ongoing weight loss over the past 2 years, but cannot estimate how much weight patient has lost.  Apparently appears to be eating and drinking normally per daughter as well as  patient's own report.  This said, daughter also tells me that she will occasionally forget if she has eaten or not.  Suspect that cognitive decline is contributing to weight loss.  By review of outside chart, it appears that she lost nearly 20 pounds between August 2023 and August 2024.  - Regular adult diet.  - Daily weights.  - Cognitive assessment via Occupational Therapy as above.  - Patient would likely benefit from increased supervision either at home or in a facility if she is missing meals secondary to memory impairment, scheduled meals with family members or friends for social aspect of eating.  - Monitor for symptoms associated with eating.  Patient reports none currently.  - Albumin 3.7 on 5/4/25.  - Ongoing age-appropriate cancer screening through primary care.    Anion gap metabolic acidosis, improved: Suspect secondary to seizure event.  - Received 1 L lactated ringer bolus.  - Labs improved on 5/4/25.  - Encourage oral intake.  - BMP in AM.    Hypertension:  - Continue PTA Norvasc 10 mg daily.    Marijuana use: Smokes marijuana regularly per daughter.  - Noted.    History of alcohol dependence in sustained remission  - Sober since 2018 by outside record review.       Observation Goals: -diagnostic tests and consults completed and resulted, -vital signs normal or at patient baseline, -returns to baseline functional status, -safe disposition plan has been identified, Nurse to notify provider when observation goals have been met and patient is ready for discharge.  Diet: Regular Diet Adult    DVT Prophylaxis: Pneumatic Compression Devices  Swan Catheter: Not present  Lines: None     Cardiac Monitoring: None  Code Status: No CPR- Pre-arrest intubation OK      Clinically Significant Risk Factors Present on Admission                   # Hypertension: Noted on problem list                      Social Drivers of Health    Food Insecurity: Not on File (9/26/2024)    Received from [employer]      Food: 0   Housing Stability: Not on File (2024)    Received from [employer]    Housing Stability     Housin   Tobacco Use: Medium Risk (2024)    Received from [employer] [facility]    Patient History     Smoking Tobacco Use: Former     Smokeless Tobacco Use: Never   Financial Resource Strain: Not on File (2024)    Received from [employer]    Financial Resource Strain     Financial Resource Strain: 0   Transportation Needs: Not on File (2024)    Received from [employer]    Transportation Needs     Transportation: 0   Physical Activity: Not on File (2024)    Received from [employer]    Physical Activity     Physical Activity: 0   Stress: Not on File (2024)    Received from [employer]    Stress     Stress: 0   Social Connections: Not on File (9/15/2024)    Received from [employer]    Social Connections     Connectedness: 0          Disposition Plan     Medically Ready for Discharge: Anticipated in 2-4 Days             Dom Doll MD  Hospitalist Service  [facility]  Securely message with [employer] (more info)  Text page via [employer] Paging/Directory   ______________________________________________________________________    Interval History   Bita Stephenson was seen this morning.  She feels okay.  No seizures since admission.  Currently denies headache, fevers, chest pain, shortness of breath, nausea, abdominal pain.  Briefly discussed with neurology.    Physical Exam   Vital Signs: Temp: 99.2  F (37.3  C) Temp src: Oral BP: (!) 151/93 Pulse: 90   Resp: 16 SpO2: 93 % O2 Device: None (Room air)    Weight: 0 lbs 0 oz    Constitutional: awake, alert, cooperative, no apparent distress, laying in the hospital bed  Respiratory: no increased work of breathing, clear to auscultation bilaterally, no crackles or wheezing  Cardiovascular: regular rate and rhythm, normal S1 and S2, no murmur noted  GI: normal bowel sounds, soft, non-distended, non-tender  Skin: warm,  dry  Musculoskeletal: no lower extremity pitting edema present  Neurologic: awake, alert, answers questions appropriately, moves all extremities    Medical Decision Making       40 MINUTES SPENT BY ME on the date of service doing chart review, history, exam, documentation & further activities per the note.      Data     I have personally reviewed the following data over the past 24 hrs:    8.3  \   11.1 (L)   / 194     137 104 24.2 (H) /  105 (H)   4.2 21 (L) 1.51 (H) \     ALT: 13 AST: 21 AP: 63 TBILI: 0.3   ALB: 3.7 TOT PROTEIN: 6.2 (L) LIPASE: N/A     TSH: 2.02 T4: N/A A1C: N/A       Imaging results reviewed over the past 24 hrs:   Recent Results (from the past 24 hours)   CT Head w/o Contrast    Narrative    EXAM: CT HEAD W/O CONTRAST  LOCATION: [facility]  DATE: 5/3/2025    INDICATION: ams  COMPARISON: None.  TECHNIQUE: Routine CT Head without IV contrast. Multiplanar reformats. Dose reduction techniques were used.    FINDINGS:  INTRACRANIAL CONTENTS: No evidence of acute intracranial hemorrhage or mass effect. Scattered foci of decreased attenuation within the cerebral hemispheric white matter which are nonspecific, though most commonly ascribed to chronic small vessel ischemic   disease. The ventricles and sulci are prominent consistent with mild brain parenchymal volume loss. Gray-white matter differentiation is maintained. The basilar cisterns are patent.    VISUALIZED ORBITS/SINUSES/MASTOIDS: The globes are unremarkable. The partially imaged paranasal sinuses, mastoid air cells and middle ear cavities are unremarkable.     BONES/SOFT TISSUES: The visualized skull base and calvarium are unremarkable.      Impression    IMPRESSION:    1.  No evidence of acute intracranial hemorrhage or mass effect.   Chest XR,  PA & LAT    Narrative    EXAM: XR CHEST 2 VIEWS  LOCATION: [facility]  DATE: 5/3/2025    INDICATION: poss aspiration  COMPARISON: Chest radiograph  4/14/2021      Impression    IMPRESSION: No focal consolidation. Questionable trace right pleural fluid versus pleural thickening. No pneumothorax. Similar cardiomediastinal silhouette and aortic calcification.

## 2025-05-04 NOTE — CONSULTS
"[facility]  Neurology Consultation    Patient Name:  Bita Stephenson  MRN:  [id number]    :  1950  Date of Service:  May 4, 2025  Primary care provider:  Yi Wade      Neurology consultation service was asked to see Bita Stephenson by Dr. Christiano Rodriguez to evaluate for subclinical status in the setting of dementia    History of Present Illness:   75 year old female h/o spells thought to be consistent with syncopal events in , then in hospital witnessed generalized tonic clonic seizures in the ED in  who presented to the [facility] ED for altered mental status, her daughter found her sitting on the side of the bed, with vomitus, initially non verbal, with tongue bite and over time improving in her mental status.     Today, she reports knowing she has been in the hospital for seizures before, but not recalling that she was on seizure medications. She does not recall ever being told she should take medications for these. The last thin she recalls before this admission was going to fall asleep and prior to that was in her usual state of health, no fevers and no recent head trauma.  Thinks that her last seizure was about 2 years ago.  She does not recall the specifics surrounding that event.  She denies any vomiting, stool, or urine incontinence on awakenings prior.  She denies any history of meningitis or encephalitis.  She denies any history of seizures as a child specifically with fevers.  And she has no family history of a seizure disorder.    She has not had any issues with her vision, no headache and describes her current state is being \"good\".  She denies any weakness or tingling ongoing at the present.    ROS  A 10-point ROS was performed as per HPI.    PMH  No past medical history on file.  Past Surgical History:   Procedure Laterality Date    NO PAST SURGERIES         Medications   Medications Prior to Admission   Medication Sig Dispense Refill Last Dose/Taking    " amLODIPine (NORVASC) 10 MG tablet Take 10 mg by mouth daily    Unknown    famotidine (PEPCID) 20 MG tablet Take 20 mg by mouth 2 times daily as needed   Unknown    levETIRAcetam (KEPPRA) 500 MG tablet Take 1 tablet (500 mg) by mouth 2 times daily. 60 tablet 0 Unknown    levETIRAcetam (KEPPRA) 500 MG tablet Take 1 tablet (500 mg) by mouth 2 times daily 60 tablet 0 Unknown    melatonin 3 MG tablet Take 3 mg by mouth nightly as needed for sleep   Unknown       Allergies  Allergies   Allergen Reactions    Paroxetine Itching     tingling    Sulfa Antibiotics      Other reaction(s): *Unknown    Valium [Diazepam] Itching       Social History  Reviewed that she is a marijuana smoker    Physical Examination   Vitals: BP (!) 151/88   Pulse 79   Temp 99  F (37.2  C) (Oral)   Resp 16   SpO2 98%   General: Adult patient, lying in bed, NAD  HEENT: NC/AT, no icterus, op pink and moist  Cardiac: RRR  Chest: non-labored on RA  Abdomen: S/NT/ND  Extremities: Warm, no edema  Skin: No rash or lesion   Psych: Mood pleasant, affect congruent  Neuro:  Mental status: Awake, alert, attentive, oriented to self, states it is Saturday, knows month and year (it is Sunday), place, and circumstance. Language is fluent and coherent with intact comprehension  (but not to 3 step commands), naming and repetition. Spells WORLD backwards, follows Luria sequence accurately  Cranial nerves: VFF, PERRL, conjugate gaze, EOMI, facial sensation intact, face symmetric, shoulder shrug strong, tongue/uvula midline, no dysarthria.   Motor: Normal bulk and tone. No abnormal movements. 5/5 strength in 4/4 extremities.   Reflexes: Normoreflexic and symmetric biceps, brachioradialis, triceps, patellae, and achilles. Toes down-going.  Sensory: Intact to light touch in upper and lower distal extremities  Coordination: FNF and HS without ataxia or dysmetria.     Investigations     Narrative & Impression   EXAM: CT HEAD W/O CONTRAST  LOCATION: [facility]  DATE: 5/3/2025     INDICATION: ams  COMPARISON: None.  TECHNIQUE: Routine CT Head without IV contrast. Multiplanar reformats. Dose reduction techniques were used.     FINDINGS:  INTRACRANIAL CONTENTS: No evidence of acute intracranial hemorrhage or mass effect. Scattered foci of decreased attenuation within the cerebral hemispheric white matter which are nonspecific, though most commonly ascribed to chronic small vessel ischemic   disease. The ventricles and sulci are prominent consistent with mild brain parenchymal volume loss. Gray-white matter differentiation is maintained. The basilar cisterns are patent.     VISUALIZED ORBITS/SINUSES/MASTOIDS: The globes are unremarkable. The partially imaged paranasal sinuses, mastoid air cells and middle ear cavities are unremarkable.      BONES/SOFT TISSUES: The visualized skull base and calvarium are unremarkable.                                                                      IMPRESSION:    1.  No evidence of acute intracranial hemorrhage or mass effect.     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     Impression  #1 Probable seizure, recurrent with history of prior witnessed generalized tonic clonic seizure in 2022, still evaluating for provoking factors  #2 Likely epilepsy, if none found    She has improved as she is now speaking in longer sentences and suspect there may mild cognitive impairment at baseline, but we typically do not evaluate for dementia inpatient unless there is an acute worsening. It is the acute encephalopathy that can be assessed for. At present, she may be very minimally encephalopathic or at her baseline, and having her family or someone who knows her baseline of interaction come in to interact with her would help to determine the details of this.  Neurocognitive testing would only be performed on an outpatient basis. I will place a consult with general neurology and the primary team should discuss with family to bring this up at that visit as well.    It is important to note that I discussed the risks of being off an antiseizure medication with the patient, and she has already been started on this medication (Keppra 500mg twice daily) and she stated that she did not want to be on this medication, but understood the risks of being off of it, including injury to self from a seizure including severe injuries or death. She would comply with taking it. She grew quiet and no longer continued the discussion. I let her know that she can take her time and that we are available for questions as this seemed to be the most difficult part of the information given to her today to adjust to. We did not continue additional counseling about abstinence from marijuana use or side effects of the medication out of risk of overwhelming her at this time.    Recommendations  -Added on magnesium level  -MRI brain with and without contrast, ordered for you  -EEG routine (not prolonged) - already ordered by primary team  -Continue with Keppra 500mg BID, advise patient and family on side    effects including fatigue and depressed mood if not already done so  -Discuss abstaining from marijuana use as this can lower seizure threshold  -Neurology will place follow up outpatient with general neurology, for seizure follow up and neurocognitive evaluation if desired and appropriate at that time    -Discussed driving restriction in the [facility]: No driving for 3 months after seizure, if has recurrent seizure, no driving for 3 months after that (resets this clock) and discussed general seizure precautions including no climbing on ladders, no sitting in a bath tub, have family stay with you or frequently check in, no operating machinery which could place you at risk for  injury  -Recommended starting an antiseizure medication which she did not wish to do, but she endorsed she will do as we discussed the risks of recurrent seizure activity which she is at increased risk with a diagnosis of epilepsy (now 2 lifetime seizures).    Thank you for involving Neurology in the care of Bita Stephenson.  Please do not hesitate to call with questions/concerns (consult pager [phone]).      Marlyn Lea MD   of Neurology   [facility]

## 2025-05-04 NOTE — CARE PLAN
Observation Goals     -diagnostic tests and consults completed and resulted NO  -vital signs normal or at patient baseline YES  -returns to baseline functional status NO  -safe disposition plan has been identified NO  Nurse to notify provider when observation goals have been met and patient is ready for discharge.

## 2025-05-04 NOTE — ED NOTES
EMERGENCY DEPARTMENT SIGN OUT NOTE        ED COURSE AND MEDICAL DECISION MAKING  Patient was signed out to me at change of shift    She was brought in by medics after seizure.  Medics reported vomit at the scene.  She has a known seizure disorder and has been noncompliant with her medications.  On arrival she was postictal and slowly recovering.  Her workup revealed an acidosis as expected and slight elevated white blood cell count.  Patient ultimately seemed to recover and was awaiting family however she developed a low-grade fever.  She was reevaluated and blood cultures and antibiotics were started for suspected aspiration pneumonitis.  She remained stable.  No symptoms of meningitis including headache or nuchal rigidity. LP was attempted however no CSF was collected.  Family at the bedside around 9:00.  I spoke with the [facility] hospitalist regarding admission however [facility] has no in-house neurology consultation.  I then spoke with neurology.  Neurology recommended admission and restarting her outpatient medications.  I spoke with family.  I spoke with the hospitalist at [facility] for transfer.      FINAL IMPRESSION    1. Seizure (H)    2. Current nonadherence to medical treatment    3. Encephalopathy    4. ANNIKA (acute kidney injury)    5. Aspiration pneumonitis (H)        ED MEDS  Medications   lidocaine 1 % 0.1-1 mL (has no administration in time range)   lidocaine (LMX4) cream (has no administration in time range)   sodium chloride (PF) 0.9% PF flush 3 mL (3 mLs Intracatheter Not Given 5/3/25 1300)   sodium chloride (PF) 0.9% PF flush 3 mL (has no administration in time range)   levETIRAcetam (KEPPRA) 1,000 mg in sodium chloride 0.9 % 120 mL intermittent infusion (0 mg Intravenous Stopped 5/3/25 1208)   ondansetron (ZOFRAN) injection 4 mg (4 mg Intravenous $Given 5/3/25 1136)   sodium chloride 0.9% BOLUS 1,000 mL (0 mLs Intravenous Stopped 5/3/25 1330)   acetaminophen (TYLENOL) tablet 975 mg (975 mg Oral  $Given 5/3/25 1718)   piperacillin-tazobactam (ZOSYN) 4.5 g vial to attach to  mL bag (4.5 g Intravenous $New Bag 5/3/25 3135)   acetaminophen (TYLENOL) tablet 325 mg (325 mg Oral $Given 5/3/25 0455)       LAB  Labs Ordered and Resulted from Time of ED Arrival to Time of ED Departure   BASIC METABOLIC PANEL - Abnormal       Result Value    Sodium 140      Potassium 4.4      Chloride 106      Carbon Dioxide (CO2) 18 (*)     Anion Gap 16 (*)     Urea Nitrogen 27.9 (*)     Creatinine 1.65 (*)     GFR Estimate 32 (*)     Calcium 9.2      Glucose 148 (*)    ROUTINE UA WITH MICROSCOPIC REFLEX TO CULTURE - Abnormal    Color Urine Colorless      Appearance Urine Clear      Glucose Urine Negative      Bilirubin Urine Negative      Ketones Urine Negative      Specific Gravity Urine 1.009      Blood Urine 0.1 mg/dL (*)     pH Urine 6.5      Protein Albumin Urine 30 (*)     Urobilinogen Urine Normal      Nitrite Urine Negative      Leukocyte Esterase Urine Negative      RBC Urine 6 (*)     WBC Urine 1      Squamous Epithelials Urine <1     CBC WITH PLATELETS AND DIFFERENTIAL - Abnormal    WBC Count 12.8 (*)     RBC Count 4.32      Hemoglobin 11.7      Hematocrit 35.8      MCV 83      MCH 27.1      MCHC 32.7      RDW 14.4      Platelet Count 230      % Neutrophils 94      % Lymphocytes 4      % Monocytes 2      % Eosinophils 0      % Basophils 0      % Immature Granulocytes 0      NRBCs per 100 WBC 0      Absolute Neutrophils 12.0 (*)     Absolute Lymphocytes 0.5 (*)     Absolute Monocytes 0.2      Absolute Eosinophils 0.0      Absolute Basophils 0.0      Absolute Immature Granulocytes 0.0      Absolute NRBCs 0.0     ETHYL ALCOHOL LEVEL - Normal    Alcohol ethyl <0.01     INFLUENZA A/B, RSV AND SARS-COV2 PCR - Normal    Influenza A PCR Negative      Influenza B PCR Negative      RSV PCR Negative      SARS CoV2 PCR Negative     GLUCOSE MONITOR NURSING POCT   GLUCOSE CSF   PROTEIN TOTAL CSF   GRAM STAIN   AEROBIC BACTERIAL  CULTURE ROUTINE   BLOOD CULTURE   BLOOD CULTURE   CELL COUNT WITH DIFFERENTIAL CSF         RADIOLOGY    Chest XR,  PA & LAT   Final Result   IMPRESSION: No focal consolidation. Questionable trace right pleural fluid versus pleural thickening. No pneumothorax. Similar cardiomediastinal silhouette and aortic calcification.      CT Head w/o Contrast   Final Result   IMPRESSION:     1.  No evidence of acute intracranial hemorrhage or mass effect.          DISCHARGE MEDS  New Prescriptions    LEVETIRACETAM (KEPPRA) 500 MG TABLET    Take 1 tablet (500 mg) by mouth 2 times daily.       Kenn Mendoza MD  [facility] EMERGENCY ROOM  [address] [facility] [address] [phone]         Kenn Mendoza MD  05/03/25 9570

## 2025-05-04 NOTE — PLAN OF CARE
Goal Outcome Evaluation:      Plan of Care Reviewed With: patient    Overall Patient Progress: no changeOverall Patient Progress: no change     Reason for Admission: seizures, suspected aspiration pneumonia   Cognitive/Mentation: A/Ox 3, disoriented to situation  Neuros/CMS: Stable w/ confusion, gen weakness  VS: stable on RA, afebrile. SBP <180  GI/: Incontinent at times. Last BM yesterday  Pulmonary: LS diminished.   Pain: denies.   Drains/Lines: PIV SL  Skin: Intact  Activity: SBA  Diet: Regular with thin liquids. Takes pills whole.   Therapies recs: OT pending  Discharge: pending  Aggression Stoplight Tool: green  End of shift summary: Plan for neurology consult and EEG.

## 2025-05-04 NOTE — H&P
[facility]    History and Physical - Hospitalist Service       Date of Admission:  5/3/2025    Assessment & Plan      Bita Stephenson is a 75 year old female with a history of seizures, memory impairment admitted on 5/3/2025. She presents as a direct admission from [facility] emergency department after likely seizure event at home and subsequent postictal state.  Nonadherent to Keppra with known epilepsy.  Initial plan had been for discharge, but patient subsequently febrile with concern for aspiration pneumonia given initial presentation    Epilepsy: History of generalized tonic-clonic as well as subclinical partial seizures.  Follows with (or at least had followed with) Dr Rowe of [facility].  Seizure with postictal state: In the setting of Keppra nonadherence.  - Loaded with Keppra 1 g at [facility] prior to transfer  - Continue Keppra 500 mg twice daily  - Note that no Keppra level was drawn at outside hospital, but patient has not filled her Keppra prescription since January  - Neurology consulted  - EEG to ensure no subclinical status; note that it took multiple hours for patient to clear for postictal state at [facility], and I am not certain that her current level of interactivity is her baseline.    Cognitive impairment, suspect undiagnosed dementia: Lives with her daughter, Alice, who has noted that patient has intact distant memories, but has struggled somewhat with recalling short-term events.  At times will forget if she ate a meal or not, recently could not recall that a niece's boyfriend had visited the house or who he was.  Highly suspicious for dementing illness, either vascular dementia or progressive dementia such as Alzheimer's.  Did not recall that she has had seizures in the past despite requiring intubation and hospitalization in 2022.  Mini cog of 3 in August 2024 through primary care team, but declined a formal cognitive evaluation at that time.  Even at  her Medicare visit in 2023 she had some memory complaints.  - OT consultation for cognitive evaluation  - Patient currently lives with her daughter, Alice.  Pending degree of supervision needed and able to be provided by family, may need social work consultation for disposition planning  - Recommend assistance with medication set up/monitoring.  Hopefully this can be performed with family assistance at home  - Outpatient neurocognitive assessment if patient willing to complete  - B12, HIV, treponema antibodies with reflex RPR, TSH  - Neurology consulted as above    Suspected aspiration pneumonia: Patient with frequent coughing, found postictal with emesis.  No hypoxia and no infiltrate on chest x-ray, but also possible ANNIKA.  Febrile to 102.8 Fahrenheit at outside emergency department, leukocytosis of 12.8.  Aspiration pneumonitis could have resulted in similar findings, but typically would anticipate fever to be more proximal to aspiration event rather than 8 hours after aspiration.  -Received an initial dose of Zosyn in outside emergency department.  Treating with cefdinir and azithromycin, Zosyn discontinued  - Blood cultures x 2 pending from outside emergency department.    Stage IIIb chronic kidney disease: Creatinine of 1.65.  Was 1.15 in 2022, but by review of outside records more recently has been in the 1.8-2 range through her primary system.  Recent renal ultrasound from 2023 demonstrates small and mildly echogenic kidneys.  As such, current creatinine could represent progression of chronic kidney disease.  Reports eating and drinking normally in the preceding days  - 1 L lactated ringer bolus  - Recheck BMP in a.m.    Suspected moderate to severe protein calorie malnutrition: Daughter describes ongoing weight loss over the past 2 years, but cannot estimate how much weight patient has lost.  Apparently appears to be eating and drinking normally per daughter as well as patient's own report.  This said,  daughter also tells me that she will occasionally forget if she has eaten or not.  Suspect that cognitive decline is contributing to weight loss.  By review of outside chart, it appears that she lost nearly 20 pounds between August 2023 and August 2024.  - Regular adult diet  - Daily weights  - Cognitive assessment via Occupational Therapy as above  - Patient would likely benefit from increased supervision either at home or in a facility if she is missing meals secondary to memory impairment, scheduled meals with family members or friends for social aspect of eating.  - Monitor for symptoms associated with eating.  Patient reports none currently.  - CMP in a.m.; evaluate albumin status  - Ongoing age-appropriate cancer screening through primary care     Anion gap metabolic acidosis: Suspect secondary to seizure event  - 1 L lactated ringer bolus  - CMP in a.m.    Hypertension:  - Continue prior to admission Norvasc 10 mg daily    Marijuana use: Smokes marijuana regularly per daughter    History of alcohol dependence in sustained remission.  Sober since 2018 by outside record review              Diet:  Regular diet as tolerated  DVT Prophylaxis: Pneumatic Compression Devices  Swan Catheter: Not present  Lines: None     Cardiac Monitoring: None  Code Status:  DNR.  Okay for time-limited trial of intubation for reversible causes.  Confirmed with patient on admission.  This is a change from previously documented CODE STATUS both through [place] system as well as through [facility] primary care.  Was DNR/DNI with her primary care physician in August, but intubation for reversible causes seems very reasonable given status epilepticus and need for airway protection intubation in 2022    Clinically Significant Risk Factors Present on Admission                   # Hypertension: Noted on problem list                      Disposition Plan     Medically Ready for Discharge: Anticipated Tomorrow           Christiano Rodriguez,  MD  Hospitalist Service  [facility]  Securely message with Daljit (more info)  Text page via [facility] Paging/Directory     ______________________________________________________________________    Chief Complaint   Seizure event    History is obtained from the patient, chart review, discussion with patient's daughter, Alice    History of Present Illness   Bita Stephenson is a 75 year old female who presents to [facility] as a direct admission from [facility] emergency department after a likely seizure event and postictal state with subsequent fever and coughing suspicious for aspiration pneumonia.    Also has a history of epilepsy including a hospitalization in April 2022 where she required intubation for airway protection in the setting of status epilepticus.  [facility], and has been recommended for lifelong antiepileptic therapy.  Had been on Keppra 500 mg twice daily.    Patient lives at home with her daughter Alice and Alice's children.  She is in charge of her own medications, however.    This morning around 9:30 AM, Alice came into her mother's room and found her sitting on the edge of the bed with mucousy emesis.  Patient was able to answer in the affirmative when her daughter asked her if she was vomiting, but not in a conversational way; seemed altered.  Was still alert, however.  Daughter got called away to care for her special needs child, and when she came back into the room, patient was more altered and not verbal, though still appeared to be awake.  EMS was contacted and she was brought to the emergency department.     Anion gap metabolic acidosis noted as well as frequent coughing.  Mental status slowly cleared over several hours concerning for seizure.    It was determined that patient had not been taking her Keppra.  Last fill was in January 2025.  Neurology was contacted and patient was loaded with Keppra with plan to discharge home.      While  waiting for family to pick her up, patient developed a fever.  Chest x-ray clear, but with her frequent coughing episodes as well as witnessed emesis around time of postictal state initiating there was concern for aspiration pneumonia.  No hypoxia.  Was started on Zosyn.  LP was considered and discussed with neurology, but as patient's postictal state cleared and she had no headache or pain, it was felt unlikely that her fever was from meningitis.  Treatment was for aspiration pneumonia.    Here on arrival at [facility], she is minimally conversant.  She follows commands and answers largely yes and no answers.  When I ask her why she was not taking her Keppra she tells me that she did not know she was on this medication.  When I inquired about her seizure history including need for intubation and hospitalization in 2022 she does not recall this at all.    Patient reports no pain, was unaware of fever.  No nausea currently.  Some rhonchorous left basilar breath sounds, no wheezing.    Appears frail, she is unaware of any weight loss and tells me she has been eating and drinking normally.    With permission, discussed with her daughter Alice.  Alice tells me that her mother has had issues with forgetfulness.  She will remember distant events very clearly, but forget short-term events.  At 1 point forgot that she went on a trip to [place] for her niece's 25th wedding anniversary.  Alice confirms that occasionally her mother will forget if she ate a meal or not.  I see that the patient herself has expressed concerns with her memory to her primary care team over the past few years at her Medicare annual physicals as well as with her primary neurologist as mentioned in chart review.  I cannot see neurology records, however.  Mini cog of 3 in August and declined neurocognitive assessment as an outpatient at that time memory impairment has been noted over the past 2 years.  Patient does have a history of alcohol  "abuse, but sober since 2017/2018.    Alice tells me that her mother was in her usual state of health yesterday.  Eating and drinking normally, ambulating without difficulty, no complaints or suggestion of illness.    Daughter was unaware that she was not taking her seizure medications as patient manages her own meds    Past Medical History    Epilepsy  CKD  Depression  Hypertension  Memory impairment  History of alcohol dependence in sustained remission    Past Surgical History   Past Surgical History:   Procedure Laterality Date    NO PAST SURGERIES         Prior to Admission Medications   Prior to Admission Medications   Prescriptions Last Dose Informant Patient Reported? Taking?   amLODIPine (NORVASC) 10 MG tablet  Daughter Yes No   Sig: Take 10 mg by mouth daily    famotidine (PEPCID) 20 MG tablet  Daughter Yes No   Sig: Take 20 mg by mouth 2 times daily as needed   levETIRAcetam (KEPPRA) 500 MG tablet   No No   Sig: Take 1 tablet (500 mg) by mouth 2 times daily   levETIRAcetam (KEPPRA) 500 MG tablet   No No   Sig: Take 1 tablet (500 mg) by mouth 2 times daily.   melatonin 3 MG tablet  Daughter Yes No   Sig: Take 3 mg by mouth nightly as needed for sleep      Facility-Administered Medications: None           Physical Exam  Vital signs:  Temp: 99.2  F (37.3  C) Temp src: Oral BP: (!) 151/93 Pulse: 90   Resp: 16 SpO2: 93 % O2 Device: None (Room air)        Estimated body mass index is 20.88 kg/m  as calculated from the following:    Height as of 12/6/21: 1.588 m (5' 2.5\").    Weight as of an earlier encounter on 5/3/25: 52.6 kg (116 lb).      General Appearance: .  Frail and chronically ill-appearing 75-year-old female resting in bed.  Appears older than stated age.  Nontoxic.  Eyes: No scleral icterus or injection  HEENT: Patient with a tongue bite on the left lateral aspect of her tongue.  Not bleeding currently, though daughter reports of blood on her pillowcase at home  Respiratory: Breath sounds are clear " bilaterally to auscultation.  No wheezes or crackles  Cardiovascular: Regular rate and rhythm  GI: Abdomen soft, nontender to palpation.  Thin.  No palpable mass.  Lymph/Hematologic: No notable lower extremity edema  Musculoskeletal: Diffuse muscular wasting, moderate, and subcutaneous fat loss.  Neurologic: Alert and conversant.  Follows directions appropriately.  Does not recall that she has a seizure disorder or remember hospitalization in 2022 which required intubation for seizures.  By chart review, has had ongoing memory issues over the past few years with similar degree of forgetfulness; there is a description of forgetting that she went to [place] for an anniversary celebration of her niece noted on 2024 annual physical.  Responds to most questions yes or no statements    Medical Decision Making       80 MINUTES SPENT BY ME on the date of service doing chart review, history, exam, documentation & further activities per the note.      Data     I have personally reviewed the following data over the past 24 hrs:    12.8 (H)  \   11.7   / 230     140 106 27.9 (H) /  148 (H)   4.4 18 (L) 1.65 (H) \       Imaging results reviewed over the past 24 hrs:   Recent Results (from the past 24 hours)   CT Head w/o Contrast    Narrative    EXAM: CT HEAD W/O CONTRAST  LOCATION: [facility]  DATE: 5/3/2025    INDICATION: ams  COMPARISON: None.  TECHNIQUE: Routine CT Head without IV contrast. Multiplanar reformats. Dose reduction techniques were used.    FINDINGS:  INTRACRANIAL CONTENTS: No evidence of acute intracranial hemorrhage or mass effect. Scattered foci of decreased attenuation within the cerebral hemispheric white matter which are nonspecific, though most commonly ascribed to chronic small vessel ischemic   disease. The ventricles and sulci are prominent consistent with mild brain parenchymal volume loss. Gray-white matter differentiation is maintained. The basilar cisterns are  patent.    VISUALIZED ORBITS/SINUSES/MASTOIDS: The globes are unremarkable. The partially imaged paranasal sinuses, mastoid air cells and middle ear cavities are unremarkable.     BONES/SOFT TISSUES: The visualized skull base and calvarium are unremarkable.      Impression    IMPRESSION:    1.  No evidence of acute intracranial hemorrhage or mass effect.   Chest XR,  PA & LAT    Narrative    EXAM: XR CHEST 2 VIEWS  LOCATION: [facility]  DATE: 5/3/2025    INDICATION: poss aspiration  COMPARISON: Chest radiograph 4/14/2021      Impression    IMPRESSION: No focal consolidation. Questionable trace right pleural fluid versus pleural thickening. No pneumothorax. Similar cardiomediastinal silhouette and aortic calcification.

## 2025-05-04 NOTE — PROGRESS NOTES
[facility] EEG #  portable, ordered by Christiano Rodriguez MD.      Pt is awake, cooperative, able to follow commands and oriented to place, self, day.    No activations

## 2025-05-04 NOTE — PROGRESS NOTES
Observation Note:  -diagnostic tests and consults completed and resulted: Not met  -vital signs normal or at patient baseline: Met  -returns to baseline functional status: Not met  -safe disposition plan has been identified: Not met  Nurse to notify provider when observation goals have been met and patient is ready for discharge.

## 2025-05-04 NOTE — PHARMACY-ADMISSION MEDICATION HISTORY
Admission medication history completed at [facility]. Please see Pharmacist Admission Medication History note from 5/3/2025.     ANKUSH STANLEY RPH on 5/4/2025 at 7:30 AM

## 2025-05-05 ENCOUNTER — APPOINTMENT (OUTPATIENT)
Dept: CT IMAGING | Facility: CLINIC | Age: 75
End: 2025-05-05
Attending: NURSE PRACTITIONER
Payer: COMMERCIAL

## 2025-05-05 ENCOUNTER — APPOINTMENT (OUTPATIENT)
Dept: CT IMAGING | Facility: CLINIC | Age: 75
End: 2025-05-05
Attending: HOSPITALIST
Payer: COMMERCIAL

## 2025-05-05 ENCOUNTER — APPOINTMENT (OUTPATIENT)
Dept: CT IMAGING | Facility: CLINIC | Age: 75
End: 2025-05-05
Attending: INTERNAL MEDICINE
Payer: COMMERCIAL

## 2025-05-05 LAB
ANION GAP SERPL CALCULATED.3IONS-SCNC: 15 MMOL/L (ref 7–15)
BUN SERPL-MCNC: 26.5 MG/DL (ref 8–23)
CALCIUM SERPL-MCNC: 8.3 MG/DL (ref 8.8–10.4)
CHLORIDE SERPL-SCNC: 104 MMOL/L (ref 98–107)
CHOLEST SERPL-MCNC: 186 MG/DL
CREAT SERPL-MCNC: 1.34 MG/DL (ref 0.51–0.95)
EGFRCR SERPLBLD CKD-EPI 2021: 41 ML/MIN/1.73M2
ERYTHROCYTE [DISTWIDTH] IN BLOOD BY AUTOMATED COUNT: 14.2 % (ref 10–15)
EST. AVERAGE GLUCOSE BLD GHB EST-MCNC: 100 MG/DL
GLUCOSE SERPL-MCNC: 73 MG/DL (ref 70–99)
HBA1C MFR BLD: 5.1 %
HCO3 SERPL-SCNC: 18 MMOL/L (ref 22–29)
HCT VFR BLD AUTO: 34.3 % (ref 35–47)
HDLC SERPL-MCNC: 60 MG/DL
HGB BLD-MCNC: 10.9 G/DL (ref 11.7–15.7)
LDLC SERPL CALC-MCNC: 97 MG/DL
LVEF ECHO: NORMAL
MCH RBC QN AUTO: 27 PG (ref 26.5–33)
MCHC RBC AUTO-ENTMCNC: 31.8 G/DL (ref 31.5–36.5)
MCV RBC AUTO: 85 FL (ref 78–100)
NONHDLC SERPL-MCNC: 126 MG/DL
PLATELET # BLD AUTO: 198 10E3/UL (ref 150–450)
POTASSIUM SERPL-SCNC: 4 MMOL/L (ref 3.4–5.3)
RBC # BLD AUTO: 4.04 10E6/UL (ref 3.8–5.2)
SODIUM SERPL-SCNC: 137 MMOL/L (ref 135–145)
TRIGL SERPL-MCNC: 144 MG/DL
WBC # BLD AUTO: 8.7 10E3/UL (ref 4–11)

## 2025-05-05 PROCEDURE — 70486 CT MAXILLOFACIAL W/O DYE: CPT

## 2025-05-05 PROCEDURE — 250N000013 HC RX MED GY IP 250 OP 250 PS 637: Performed by: NURSE PRACTITIONER

## 2025-05-05 PROCEDURE — 85027 COMPLETE CBC AUTOMATED: CPT | Performed by: HOSPITALIST

## 2025-05-05 PROCEDURE — 250N000009 HC RX 250: Performed by: HOSPITALIST

## 2025-05-05 PROCEDURE — 80048 BASIC METABOLIC PNL TOTAL CA: CPT | Performed by: HOSPITALIST

## 2025-05-05 PROCEDURE — 83036 HEMOGLOBIN GLYCOSYLATED A1C: CPT | Performed by: NURSE PRACTITIONER

## 2025-05-05 PROCEDURE — 250N000013 HC RX MED GY IP 250 OP 250 PS 637: Performed by: HOSPITALIST

## 2025-05-05 PROCEDURE — 250N000011 HC RX IP 250 OP 636: Performed by: HOSPITALIST

## 2025-05-05 PROCEDURE — 97535 SELF CARE MNGMENT TRAINING: CPT | Mod: GO

## 2025-05-05 PROCEDURE — G0378 HOSPITAL OBSERVATION PER HR: HCPCS

## 2025-05-05 PROCEDURE — 97165 OT EVAL LOW COMPLEX 30 MIN: CPT | Mod: GO

## 2025-05-05 PROCEDURE — 71260 CT THORAX DX C+: CPT

## 2025-05-05 PROCEDURE — 70498 CT ANGIOGRAPHY NECK: CPT

## 2025-05-05 PROCEDURE — 250N000013 HC RX MED GY IP 250 OP 250 PS 637: Performed by: INTERNAL MEDICINE

## 2025-05-05 PROCEDURE — 36415 COLL VENOUS BLD VENIPUNCTURE: CPT | Performed by: HOSPITALIST

## 2025-05-05 PROCEDURE — 99233 SBSQ HOSP IP/OBS HIGH 50: CPT | Performed by: HOSPITALIST

## 2025-05-05 PROCEDURE — 82465 ASSAY BLD/SERUM CHOLESTEROL: CPT | Performed by: NURSE PRACTITIONER

## 2025-05-05 PROCEDURE — 99418 PROLNG IP/OBS E/M EA 15 MIN: CPT | Mod: FS | Performed by: NURSE PRACTITIONER

## 2025-05-05 PROCEDURE — 70450 CT HEAD/BRAIN W/O DYE: CPT | Mod: XU

## 2025-05-05 PROCEDURE — 99233 SBSQ HOSP IP/OBS HIGH 50: CPT | Mod: FS | Performed by: NURSE PRACTITIONER

## 2025-05-05 RX ORDER — ASPIRIN 81 MG/1
81 TABLET, CHEWABLE ORAL DAILY
Status: DISCONTINUED | OUTPATIENT
Start: 2025-05-06 | End: 2025-05-06 | Stop reason: HOSPADM

## 2025-05-05 RX ORDER — ATORVASTATIN CALCIUM 40 MG/1
40 TABLET, FILM COATED ORAL EVERY EVENING
Status: DISCONTINUED | OUTPATIENT
Start: 2025-05-05 | End: 2025-05-06 | Stop reason: HOSPADM

## 2025-05-05 RX ORDER — IOPAMIDOL 755 MG/ML
67 INJECTION, SOLUTION INTRAVASCULAR ONCE
Status: DISCONTINUED | OUTPATIENT
Start: 2025-05-05 | End: 2025-05-05

## 2025-05-05 RX ORDER — IOPAMIDOL 755 MG/ML
67 INJECTION, SOLUTION INTRAVASCULAR ONCE
Status: COMPLETED | OUTPATIENT
Start: 2025-05-05 | End: 2025-05-05

## 2025-05-05 RX ORDER — CEFDINIR 300 MG/1
300 CAPSULE ORAL DAILY
Status: DISCONTINUED | OUTPATIENT
Start: 2025-05-06 | End: 2025-05-06 | Stop reason: HOSPADM

## 2025-05-05 RX ADMIN — LEVETIRACETAM 500 MG: 500 TABLET, FILM COATED ORAL at 20:00

## 2025-05-05 RX ADMIN — VITAM B12 100 MCG: 100 TAB at 08:43

## 2025-05-05 RX ADMIN — ACETAMINOPHEN 650 MG: 325 TABLET, FILM COATED ORAL at 08:43

## 2025-05-05 RX ADMIN — ATORVASTATIN CALCIUM 40 MG: 40 TABLET, FILM COATED ORAL at 19:53

## 2025-05-05 RX ADMIN — CEFDINIR 300 MG: 300 CAPSULE ORAL at 08:43

## 2025-05-05 RX ADMIN — LEVETIRACETAM 500 MG: 500 TABLET, FILM COATED ORAL at 08:43

## 2025-05-05 RX ADMIN — AMLODIPINE BESYLATE 10 MG: 10 TABLET ORAL at 08:43

## 2025-05-05 RX ADMIN — AZITHROMYCIN DIHYDRATE 250 MG: 250 TABLET ORAL at 08:43

## 2025-05-05 RX ADMIN — IOPAMIDOL 67 ML: 755 INJECTION, SOLUTION INTRAVENOUS at 10:44

## 2025-05-05 RX ADMIN — SODIUM CHLORIDE 90 ML: 9 INJECTION, SOLUTION INTRAVENOUS at 10:44

## 2025-05-05 ASSESSMENT — ACTIVITIES OF DAILY LIVING (ADL)
ADLS_ACUITY_SCORE: 66
DEPENDENT_IADLS:: INDEPENDENT
ADLS_ACUITY_SCORE: 66

## 2025-05-05 NOTE — PROGRESS NOTES
"[facility]    Medicine Progress Note - Hospitalist Service    Date of Admission:  5/3/2025    Assessment & Plan   Bita Stephenson is a 75 year old female with a history of seizures, memory impairment admitted on 5/3/2025. She presentd as a direct admission from [facility] emergency department after likely seizure event at home and subsequent postictal state.  Nonadherent to Keppra with known epilepsy.  Initial plan had been for discharge, but patient subsequently febrile with concern for aspiration pneumonia given initial presentation.    Epilepsy  History of generalized tonic-clonic as well as subclinical partial seizures.  Follows with (or at least had followed with) Dr Rowe of [facility].  Seizure with postictal state: In the setting of Keppra nonadherence.  Admitted to inpatient.  Loaded with Keppra 1 g at [facility] prior to transfer.  Continue Keppra 500 mg twice daily.  Note that no Keppra level was drawn at outside hospital, but patient has not filled her Keppra prescription since January.  Neurology consulted, appreciate their assistance.  EEG on 5/4/25 showed findings consistent with moderate diffuse encephalopathy, no electrographic seizures or epileptiform discharges.    Possible small acute/subacute infarct in the right cerebellum  MRI brain on 5/4/25 showed \"Tiny punctate focus of increased T2 signal within the right cerebellum without definitive diminished ADC signal. This finding may reflect artifact or a small acute/subacute infarct.\"  Stroke Neurology consulted, appreciate their assistance.    Maxillary lesion  Patient has noted a lump in the anterior right hard palate over the past year or so, states it is getting larger.   MRI brain showed: \"Nonenhancing expansile cystic lesion within the right of anterior maxilla measuring up to 3.1 cm.\"  CT facial bones on 5/5/25 showed \"Redemonstration of a lobulated cystic and lytic lesion involving the anterior right maxilla as " "detailed above and on prior brain MRI. Lesion demonstrates multiple areas of cortical thinning and dehiscence with areas of faint mineralization suspected   along the medial margin and lateral margin to lesser degree. No significant arterial enhancement is seen associated with lesion on contemporaneous head and neck CTA. Lesion was present on study from April 2022 in retrospect, though has demonstrated progressive growth in the interim. Again, primary diagnostic considerations would include odontogenic keratocyst, radicular cyst, atypical ameloblastoma, or atypical odontogenic myxoma and OMFS consultation is advised.\"  I have a page out to oral surgery to discuss further evaluation/management.  Addendum: Discussed with oral surgery.  No acute intervention recommended at this time.  Can follow up with oral surgery as an outpatient after her acute medical issues have been addressed.    Left lateral subdural hygroma  Noted on imaging during this admission.  Neurology and Stroke Neurology consulted as noted above.    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    Left parotid gland nodules  CTA neck on 5/5/25 showed: \"Multiple small avidly enhancing nodules along the posterior aspect of the superficial lobe of the left parotid gland, which are similar to slightly increased in size dating back to April 2022.\"  Will need outpatient follow up, consider FNA for further evaluation.    Cognitive " impairment, suspect undiagnosed dementia  Lives with her daughter, Alice, who has noted that patient has intact distant memories, but has struggled somewhat with recalling short-term events.  At times will forget if she ate a meal or not, recently could not recall that a niece's boyfriend had visited the house or who he was.  Highly suspicious for dementing illness, either vascular dementia or progressive dementia such as Alzheimer's.  Did not recall that she has had seizures in the past despite requiring intubation and hospitalization in 2022.  Mini cog of 3 in August 2024 through primary care team, but declined a formal cognitive evaluation at that time.  Even at her Medicare visit in 2023 she had some memory complaints.  OT consulted for cognitive evaluation.  Patient currently lives with her daughter, Alice.  Pending degree of supervision needed and able to be provided by family, may need social work consultation for disposition planning.  Recommend assistance with medication set up/monitoring.  Hopefully this can be performed with family assistance at home.  Outpatient neurocognitive assessment if patient willing to complete.  B12 low end of normal, started on B12 supplement.  HIV non-reactive.  TSH within normal limits. Treponema antibodies non-reactive.  Neurology consulted as above.    Suspected aspiration pneumonia  Patient with frequent coughing, found postictal with emesis.  No hypoxia and no infiltrate on chest x-ray, but also possible ANNIKA.  Febrile to 102.8 Fahrenheit at outside emergency department, leukocytosis of 12.8.  Aspiration pneumonitis could have resulted in similar findings, but typically would anticipate fever to be more proximal to aspiration event rather than 8 hours after aspiration.  Received an initial dose of Zosyn in outside emergency department, not continued at [facility], treating here with cefdinir and azithromycin.  Blood cultures x 2 pending from outside emergency  department.    Stage IIIb chronic kidney disease  Creatinine of 1.65 on 5/3/25, previously 1.15 in 2022, but by review of outside records more recently has been in the 1.8-2 range through her primary system.  Recent renal ultrasound from 2023 demonstrated small and mildly echogenic kidneys.  As such, current creatinine could represent progression of chronic kidney disease.  Reports eating and drinking normally in the preceding days.  Received 1 L lactated ringer bolus at time of admission.  Creatinine improved, 1.34 on 5/5/25.  BMP in AM.    Suspected moderate to severe protein calorie malnutrition  Daughter describes ongoing weight loss over the past 2 years, but cannot estimate how much weight patient has lost.  Apparently appears to be eating and drinking normally per daughter as well as patient's own report.  This said, daughter also tells me that she will occasionally forget if she has eaten or not.  Suspect that cognitive decline is contributing to weight loss.  By review of outside chart, it appears that she lost nearly 20 pounds between August 2023 and August 2024.  Regular adult diet.  Daily weights.  Cognitive assessment via Occupational Therapy as above.  Patient would likely benefit from increased supervision either at home or in a facility if she is missing meals secondary to memory impairment, scheduled meals with family members or friends for social aspect of eating.  Monitor for symptoms associated with eating.  Patient reports none currently.  Albumin 3.7 on 5/4/25.  Ongoing age-appropriate cancer screening through primary care.    Anion gap metabolic acidosis, improved  Suspect secondary to seizure event.  Received 1 L lactated ringer bolus.  Improved.  Encourage oral intake.  BMP in AM.    Hypertension  Continue PTA Norvasc 10 mg daily.    Marijuana use  Smokes marijuana regularly per daughter.  Noted.    History of alcohol dependence in sustained remission  Sober since 2018 by outside record  review.       Observation Goals: -diagnostic tests and consults completed and resulted, -vital signs normal or at patient baseline, -returns to baseline functional status, -safe disposition plan has been identified, Nurse to notify provider when observation goals have been met and patient is ready for discharge.  Diet: Regular Diet Adult    DVT Prophylaxis: Pneumatic Compression Devices  Swan Catheter: Not present  Lines: None     Cardiac Monitoring: None  Code Status: No CPR- Pre-arrest intubation OK      Clinically Significant Risk Factors Present on Admission           # Hypocalcemia: Lowest Ca = 8.3 mg/dL in last 2 days, will monitor and replace as appropriate         # Hypertension: Noted on problem list      # Anemia: based on hgb <11                  Social Drivers of Health    Food Insecurity: Not on File (2024)    Received from [facility]    Food Insecurity     Food: 0   Housing Stability: Not on File (2024)    Received from [facility]    Housing Stability     Housin   Tobacco Use: Medium Risk (2024)    Received from [facility]    Patient History     Smoking Tobacco Use: Former     Smokeless Tobacco Use: Never   Financial Resource Strain: Not on File (2024)    Received from [facility]    Financial Resource Strain     Financial Resource Strain: 0   Transportation Needs: Not on File (2024)    Received from [facility]    Transportation Needs     Transportation: 0   Physical Activity: Not on File (2024)    Received from [facility]    Physical Activity     Physical Activity: 0   Stress: Not on File (2024)    Received from [facility]    Stress     Stress: 0   Social Connections: Not on File (9/15/2024)    Received from [facility]    Social Connections     Connectedness: 0          Disposition Plan     Medically Ready for Discharge: Anticipated in 2-4 Days         Dom Doll MD  Hospitalist Service  [facility]  Securely message with [employer]  (more info)  Text page via [facility] Paging/Directory   ______________________________________________________________________    Interval History   Bita Stephenson was seen this morning.  She feels pretty good.  No further seizures since admission.  Denies headaches, fevers, chest pain, shortness of breath, nausea, abdominal pain.  Discussed results of imaging from overnight and plan of care going forward.  Plan of care also discussed with bedside nurse.    Physical Exam   Vital Signs: Temp: 99.5  F (37.5  C) Temp src: Oral BP: (!) 153/92 Pulse: 86   Resp: 18 SpO2: 99 % O2 Device: None (Room air)    Weight: 0 lbs 0 oz    Constitutional: awake, alert, cooperative, no apparent distress, laying in the hospital bed with the head of the bed elevated  HEENT:   Respiratory: no increased work of breathing, clear to auscultation bilaterally, no crackles or wheezing  Cardiovascular: regular rate and rhythm, normal S1 and S2, no murmur noted  GI: normal bowel sounds, soft, non-distended, non-tender  Skin: warm, dry  Musculoskeletal: no lower extremity pitting edema present  Neurologic: awake, alert, answers questions appropriately, moves all extremities    Medical Decision Making   60 MINUTES SPENT BY ME on the date of service doing chart review, history, exam, documentation & further activities per the note.      Data     I have personally reviewed the following data over the past 24 hrs:    8.7  \   10.9 (L)   / 198     137 104 26.5 (H) /  73   4.0 18 (L) 1.34 (H) \     TSH: N/A T4: N/A A1C: 5.1       Imaging results reviewed over the past 24 hrs:   Recent Results (from the past 24 hours)   MR Brain w/o & w Contrast    Narrative    EXAM: MR BRAIN W/O AND W CONTRAST  LOCATION: [facility]  DATE: 5/4/2025    INDICATION: Seizure, prior history of 1 unprovoked seizure.  COMPARISON: CT head dated 05/03/2025. MRI brain dated 04/14/2022.  CONTRAST: 5 ml Gadavist.  TECHNIQUE: 1.5 Carmela multiplanar multisequence  head MRI without and with intravenous contrast according to the seizure protocol.    FINDINGS:  INTRACRANIAL CONTENTS: Dedicated imaging of the temporal lobes demonstrates symmetrical size and signal intensity of the mesial temporal structures including the hippocampus. Tiny bilateral parahippocampal cysts are incidentally noted. No malformations   of cortical development.     There is a tiny punctate focus of increased DWI signal in the right cerebellum (image 7 series 11,002) without definitive diminished ADC signal. This finding may reflect artifact or a small acute/subacute infarct. No abnormal diffusion-weighted signal   noted elsewhere. There are chronic bilateral cerebellar lacunar-type infarcts. Additionally, there are chronic bilateral thalamic lacunar type infarctions.     No mass identified.     There is a thin CSF density extra-axial/subdural fluid collection noted along the left lateral cerebral convexity measuring up to 7 mm in greatest transverse dimensions, new since prior head CT dated 05/03/2025. This collection likely reflects a small   subdural hygroma. There is a small focus of susceptibility artifact noted along the anterior margin of the subdural collection, possibly reflecting a small focus of blood product versus mineralization.    Patchy and confluent nonspecific T2/FLAIR hyperintensities within the cerebral white matter most consistent with moderate chronic microvascular ischemic change. Mild generalized cerebral atrophy. No hydrocephalus.  Normal position of the cerebellar   tonsils. There is mild thin diffuse dural thickening/enhancement.    SELLA: No abnormality accounting for technique.    OSSEOUS STRUCTURES/SOFT TISSUES: There is a nonenhancing expansile cystic lesion within the right anterior maxilla demonstrating T1 iso to hypointense signal and T2 hyperintense signal measuring approximately 3.1 x 2.6 cm in greatest axial dimensions and   approximately 1.9 cm in craniocaudal  dimensions. Previously this lesion measured approximately 2.4 x 1.6 in greatest axial dimensions (on MRI brain dated 04/14/2022).    The major intracranial flow voids are maintained. There is focal lobularity of the left proximal M2 posterior division middle cerebral artery measuring up to 4 x 4 mm in greatest axial dimensions (image 77 series 16,001).     ORBITS: No abnormality accounting for technique.     SINUSES/MASTOIDS: No paranasal sinus mucosal disease. A single right inferior mastoid air cell is opacified.       Impression    IMPRESSION:  1.  Tiny punctate focus of increased T2 signal within the right cerebellum without definitive diminished ADC signal. This finding may reflect artifact or a small acute/subacute infarct.  2.  Thin CSF density extra-axial/subdural fluid collection along the left lateral cerebral convexity measuring up to 7 mm in greatest transverse dimensions, new since prior head CT dated 05/03/2025. This collection likely reflects a small subdural   hygroma. There is a small focus of susceptibility artifact noted along the anterior margin of the subdural collection, possibly reflecting a small focus of blood product versus mineralization.  3.  Mild diffuse dural thickening/enhancement. This finding is nonspecific and may be idiopathic, but can also be seen in the setting of intracranial hypotension, recent lumbar puncture, infectious/inflammatory processes or even malignancy.  4.  Chronic bilateral cerebellar and thalamic lacunar type infarctions.  5.  Moderate presumed chronic small vessel ischemic change.  6.  Nonenhancing expansile cystic lesion within the right of anterior maxilla measuring up to 3.1 cm. This finding is nonspecific and may reflect an odontogenic keratocyst, radicular cyst, atypical ameloblastoma, or even atypical odontogenic myxoma,   among other etiologies. Recommend CT face and oral maxillofacial surgical consultation for further assessment.  7.  Focal lobularity of  the left proximal M2 posterior division middle cerebral artery measuring up to 4 mm. This finding may reflect a small aneurysm or vascular tortuosity. Recommend follow-up CTA brain for further assessment.            Dr. Erich Lim discussed results with Dr. RICHARD SEQUEIRA on 05/05/2025 at 12:34 AM CDT.     CT Head w/o Contrast    Narrative    EXAM: CT HEAD W/O CONTRAST  LOCATION: [facility]  DATE: 5/5/2025    INDICATION: Eval for blood product in hygroma.  COMPARISON: 5/4/2025, 5/3/2025.  TECHNIQUE: Routine CT Head without IV contrast. Multiplanar reformats. Dose reduction techniques were used.    FINDINGS:  INTRACRANIAL CONTENTS: Stable subdural hygroma along the left cerebral hemisphere. No acute hemorrhage or adverse mass effect. Redemonstrated chronic infarcts in the cerebellum and thalami. No large territorial gray-white differentiation loss. Moderate   presumed chronic small vessel ischemic changes. Mild generalized volume loss. No hydrocephalus.     VISUALIZED ORBITS/SINUSES/MASTOIDS: No intraorbital abnormality. No paranasal sinus mucosal disease. Small right mastoid effusion.    BONES/SOFT TISSUES: Partially visualized lytic lesion in the right maxilla is again noted. Indeterminate nodule in the left carotid versus parapharyngeal space is incompletely evaluated, measuring approximately 1.4 x 1.6 cm in diameter.      Impression    IMPRESSION:  1.  Stable subdural hygroma along the left cerebral hemisphere. No acute hemorrhage.  2.  Indeterminate nodule in the left carotid versus parapharyngeal space is incompletely evaluated.   3.  Redemonstrated lytic lesion in the right maxilla.  4.  Consider further evaluation with nonemergent contrast-enhanced neck CT.     CT Facial Bones without Contrast    Narrative    EXAM: CT FACIAL BONES WITHOUT CONTRAST  LOCATION: [facility]  DATE: 5/5/2025    INDICATION: evaluate maxillary abnormality seen on MRI  COMPARISON:  Contemporaneous head/neck CTA. Head/neck CTA: 4/14/2022. Brain MRI: 5/4/2025.  TECHNIQUE: Routine CT Maxillofacial without IV contrast. Multiplanar reformats. Dose reduction techniques were used.     FINDINGS:  OSSEOUS STRUCTURES/SOFT TISSUES: As seen on recent head CT and brain MRI, there is a lobulated predominantly low-attenuation lytic lesion involving the right aspect of the maxilla, which measures approximately 27 x 26 x 23 mm (AP by TV by cc). A few   punctate foci of hyperattenuation are seen along the lesion margins medially and laterally to a lesser degree. No significant arterial enhancement is seen associated with this lesion on contemporaneous head and neck CTA. Lobulated bony remodeling is seen   along the lesion margins with multiple areas of cortical thinning and dehiscence. This lesion was present to some degree on study performed in April 2022, though has increased in size in the interim. A portion of lesion appears centered about the apices   of both maxillary central incisor teeth as well as the right lateral incisor and right cuspid teeth.    No significant localized soft tissue swelling/inflammation. No acute facial bone fracture or malalignment.    ORBITAL CONTENTS: No acute abnormality.    SINUSES: No significant paranasal sinus mucosal disease.    VISUALIZED INTRACRANIAL CONTENTS: No acute abnormality. Small right mastoid effusion, nonspecific.      Impression    IMPRESSION:     1.  Redemonstration of a lobulated cystic and lytic lesion involving the anterior right maxilla as detailed above and on prior brain MRI. Lesion demonstrates multiple areas of cortical thinning and dehiscence with areas of faint mineralization suspected   along the medial margin and lateral margin to lesser degree. No significant arterial enhancement is seen associated with lesion on contemporaneous head and neck CTA. Lesion was present on study from April 2022 in retrospect, though has demonstrated   progressive  growth in the interim. Again, primary diagnostic considerations would include odontogenic keratocyst, radicular cyst, atypical ameloblastoma, or atypical odontogenic myxoma and OMFS consultation is advised.  2.  No acute facial bone fracture.     CTA Head Neck with Contrast    Narrative    EXAM: CTA HEAD NECK W CONTRAST  LOCATION: [facility]  DATE: 5/5/2025    INDICATION: evaluate M2 abnormality seen on brain MRI  COMPARISON: Brain MRI: 5/4/2025. Head and neck CTA: 4/14/2022.  CONTRAST: 67 mL Isovue 370  TECHNIQUE: Head and neck CT angiogram with IV contrast. Axial helical CT images of the head and neck vessels obtained during the arterial phase of intravenous contrast administration. Axial 2D reconstructed images and multiplanar 3D MIP reconstructed   images of the head and neck vessels were performed by the technologist. Dose reduction techniques were used. All stenosis measurements made according to NASCET criteria unless otherwise specified.    FINDINGS:     HEAD CTA:  ANTERIOR CIRCULATION: No high-grade stenosis/occlusion. Calcified atherosclerosis of the carotid siphons with short segment of mild to moderate stenosis involving the proximal aspect of the left cavernous ICA segment. No greater than mild stenosis   involving the remainder. Tiny broad-based laterally directed outpouching associated with the right cavernous ICA segment with faint calcification along its margins (series 9/image 456). Short segment of aneurysmal dilatation involving the proximal aspect   of a left MCA M2 posterior division branch measuring approximately 4 mm in diameter. Fetal origin of both posterior cerebral arteries from the anterior circulation.    POSTERIOR CIRCULATION: No high-grade stenosis/occlusion, aneurysm, or high flow vascular malformation. Balanced vertebral arteries supply a patent basilar artery. Calcified atherosclerosis of the left vertebral artery V4 segment without substantial    stenosis.    DURAL VENOUS SINUSES: Expected enhancement of the major dural venous sinuses.    NECK CTA:  RIGHT CAROTID: Mild calcified plaque scattered along the length of the common carotid artery and about the carotid bifurcation/proximal ICA without substantial (50% or greater) luminal stenosis. No evidence of dissection.    LEFT CAROTID: Mild calcified plaque about the carotid bifurcation and proximal ICA without substantial (50% or greater) luminal stenosis. No evidence of dissection.    VERTEBRAL ARTERIES: No focal high-grade stenosis or evidence of dissection. Balanced vertebral arteries.    AORTIC ARCH: Classic aortic arch anatomy. Scattered calcified plaque throughout the aortic arch without high-grade stenosis at the origins of the great vessels.    NONVASCULAR STRUCTURES: Avidly enhancing lesion with centrally necrotic appearance involving the distal left carotid sheath with slight medial displacement of the left ICA. Lesion measures 15 x 13 x 17 mm (AP by TV by cc) and is increased in size since   study performed in April 2022. Multiple small avidly enhancing nodules along the posterior aspect of the superficial lobe of the left parotid gland are similar to slightly increased in size dating back to April 2022 (series 9/images [id number]). The largest   such lesion measures 14 mm in craniocaudal dimension and is located superiorly. Please refer to contemporaneous face CT for description of lytic lesion involving the anterior right maxilla. Multilevel cervical spondylosis.      Impression    IMPRESSION:     HEAD CTA:   1.  No high-grade stenosis/occlusion involving the major intracranial arteries.  2.  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   3.  Tiny broad-based laterally directed outpouching associated with the right cavernous ICA segment with faint calcification along its margins. This may  represent irregular atherosclerotic disease or a tiny sessile aneurysm.    NECK CTA:  1.  No flow-limiting stenosis/occlusion or dissection involving the major arteries of the neck.  2.  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   3.  Multiple small avidly enhancing nodules along the posterior aspect of the superficial lobe of the left parotid gland, which are similar to slightly increased in size dating back to April 2022. Lesions could represent intraparotid lymphadenopathy or   primary parotid neoplasm for example. Consider FNA for further evaluation.  4.  No evidence for additional paragangliomas in the neck or skull base.   CT Chest/Abdomen/Pelvis w Contrast    Narrative    EXAM: CT CHEST/ABDOMEN/PELVIS W CONTRAST  LOCATION: [facility]  DATE: 5/5/2025    INDICATION: evaluate for underlying malignancy  COMPARISON: 12/6/2021  TECHNIQUE: CT scan of the chest, abdomen, and pelvis was performed following injection of IV contrast. Multiplanar reformats were obtained. Dose reduction techniques were used.   CONTRAST: 67 mL Isovue 370    FINDINGS:   LUNGS AND PLEURA: Patent central airways. Mild apical predominant centrilobular and paraseptal emphysema. There are multiple bilateral upper lobe predominant and subpleural predominant solid and groundglass pulmonary nodules are identified, all of which   appear unchanged since December 2021. The largest of these measures 0.5 x 0.4 cm in lateral right upper lobe (series 6, image 163). The few ill-defined ground glass opacities and reticular scarring in the posterior lateral left upper lobe (series 6,   image 170) also appear unchanged and likely reflect areas of scarring. Additional reticular interstitial scarring is present at the bilateral lung bases. No acute appearing airspace  opacities. No pleural effusions. No pneumothorax.    MEDIASTINUM/AXILLAE: Tortuous normal caliber thoracic aorta. Normal intrathoracic lymphadenopathy. Normal heart size. No pericardial effusion. Normal esophagus.    CORONARY ARTERY CALCIFICATION: Moderate to severe    HEPATOBILIARY: Left hepatic dome cyst for which no follow-up is recommended. No worrisome focal hepatic mass, biliary dilatation, or calcified gallstones.    PANCREAS: No focal pancreatic mass, peripancreatic inflammation, or pancreatic ductal dilatation.    SPLEEN: Normal size.    ADRENAL GLANDS: No nodules.    KIDNEYS/BLADDER: Bilateral subcentimeter renal hypodensities are present, which are too small to be characterized. No definite suspicious renal mass or hydronephrosis. No abnormal focal urinary bladder wall thickening.    BOWEL: Unremarkable appearance of the stomach. The small bowel loops are nondilated and grossly unremarkable. No evidence for appendicitis. Colonic diverticulosis without pericolonic inflammation.    LYMPH NODES: Abdominal or pelvic lymphadenopathy.    VASCULATURE: Normal caliber abdominal aorta. Multifocal atherosclerotic plaques are present. Patent portal, splenic, and mesenteric veins. Patent bilateral renal veins.    PELVIC ORGANS: No large pelvic mass.    MUSCULOSKELETAL: Osteopenia. Degenerative changes of the spine. No aggressive osseous lesion.      Impression    IMPRESSION:  1.  No definite findings to suggest malignancy in the chest, abdomen, or pelvis.  2.  Unchanged multiple bilateral upper lobe predominant and subpleural predominant solid and groundglass pulmonary nodules. Given the stability, these are presumably benign.  3.  Mild emphysema.  4.  Moderate to severe coronary artery calcifications.  5.  Colonic diverticulosis.

## 2025-05-05 NOTE — PROGRESS NOTES
General Neurology Documentation Note- patient not seen  I reviewed the primary service's note to assess for clinical questions.    Brain MRI showed the following non-vascular findings:  -Tiny subdural hygroma  -Mild diffuse dural thickening/enhancement    These findings can be seen in spontaneous intracranial hypotension due to spinal CSF leak, which usually presents as a postural headache, worse when the patient is sitting or standing and eliminated when lying flat. They are not associated with seizures and would not be expected to be contributing to her current admission. She endorsed no headache when I saw her in review of systems, but was also reclining during my visit with cognitive impairments and may not recall having this symptom when upright.    CSF leak can also present as a frontotemporal dementia like presentation. Dementia would be best evaluated in the outpatient setting.    Of note, spinal CSF leaks can be empirically treated with a lumbar spinal blood patch or evaluated further at [facility]. Either PCP could refer or patient/family could self refer for evaluation there.    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

## 2025-05-05 NOTE — PLAN OF CARE
Goal Outcome Evaluation:      Plan of Care Reviewed With: patient    Overall Patient Progress: improvingOverall Patient Progress: improving     Reason for Admission: seizures, suspected aspiration pneumonia   Cognitive/Mentation: A/Ox4  Neuros/CMS: Stable w/ intermittent confusion, gen weakness - improving  VS: stable on RA, afebrile. SBP <180  GI/: Continent this shift. Last BM 5/3  Pulmonary: LS diminished.   Pain: denies.   Drains/Lines: PIV SL  Skin: Intact  Activity: SBA  Diet: Regular with thin liquids. Takes pills whole.   Therapies recs: OT pending  Discharge: pending  Aggression Stoplight Tool: green  End of shift summary: MRI + CT completed last night, showing possible small infarct, aneurysm and subdural hygroma. Stroke neuro consult placed.

## 2025-05-05 NOTE — PROGRESS NOTES
"   05/05/25 0900   Appointment Info   Signing Clinician's Name / Credentials (OT) Sherie Nieves OTR/L   Quick Adds   Quick Adds Certification   Living Environment   People in Home child(meli), adult;grandchild(meli)   Current Living Arrangements mobile home   Living Environment Comments Pt lives w/ daughter (Alice), pt lives in mobile home, 3 [address] the home, does not have railing but does have a book case that she has available to support too while going up/down.   Self-Care   Usual Activity Tolerance good   Current Activity Tolerance good   Regular Exercise Yes   Activity/Exercise Type walking  (pt reports she walks around [place])   Equipment Currently Used at Home none   Activity/Exercise/Self-Care Comment Pt is IND w/ basic self cares at baseline.   Instrumental Activities of Daily Living (IADL)   IADL Comments Daughter does most of the grocery shopping and meal prep, pt does typically manage her own medications via pill box reports last week is the first time that she went a whole week without taking them because she was \"busy outside with the [employer] and in the yard\" (chart indicates med  non-compliance since Jan of this year?. Pt states daughter does most of the driving however she will occasionally drive   General Information   Onset of Illness/Injury or Date of Surgery 05/03/25   Referring Physician Rodriguez, Christiano Mccarty MD   Patient/Family Therapy Goal Statement (OT) return to home   Additional Occupational Profile Info/Pertinent History of Current Problem Per chart review: \"Bita Stephenson is a 75 year old female with a history of seizures, memory impairment admitted on 5/3/2025. She presentd as a direct admission from [facility] emergency department after likely seizure event at home and subsequent postictal state.  Nonadherent to Keppra with known epilepsy.  Initial plan had been for discharge, but patient subsequently febrile with concern for aspiration pneumonia given initial presentation.  \" "   Cognitive Status Examination   Orientation Status person;place  (pt states day of week as sunday (it is monday however board in room had not been updated yet and still had sunday circled), aware she came into the hospital on her birthday)   Affect/Mental Status (Cognitive) WNL   Follows Commands follows one-step commands;delayed response/completion;increased processing time needed   Memory Deficit moderate deficit;short-term memory;immediate recall;working memory   Attention Deficit minimal deficit   Cognitive Status Comments Pt is forgetful and daughter reported that overtime has been having an increasingly harder time w/ memory and recall   Cognitive Screens/Assessments   Cognitive Assessments Completed [facility] Mental Status Exam (UMS):  Total Score out of /30 17/30   [facility] Norms 1-20 equals dementia   [facility] Domains assessed: orientation, memory, attention, executive functions   SLUMS Interpretation Pt scoring 17/30 on UMS cognitive assessment indicating severe cognitive impairment and would recommend follow up w/ OP neuropsych testing. Pt scoring 2/3 orientation, 3/3 basic math and higher level cognitive tasks, 2/3 animal naming (abstract thinking), 2/5 word recall/memory, 2/2 number sequencing, 0/4 clock drawing for executive function, 2/2 shape identification and perception, 4/8 story recall for short term memory. Patient demonstrates deficits in the following areas:  attention, delayed recall, executive functions, clock drawing (significant difficulty w/ number placement and hour markers), and orientation.  Please note that this examination is used to screen individuals to look for the presence of cognitive deficits and to identify changes in cognition over time.  Pt would benefit from assist w/ medications, driving, supervision w/ meal prep for safety upon discharge. See discharge recommendations for further details.   Visual Perception   Visual Impairment/Limitations WNL   Sensory    Sensory Quick Adds sensation intact   Posture   Posture not impaired   Range of Motion Comprehensive   General Range of Motion no range of motion deficits identified   Strength Comprehensive (MMT)   General Manual Muscle Testing (MMT) Assessment no strength deficits identified   Coordination   Upper Extremity Coordination No deficits were identified   Bed Mobility   Bed Mobility No deficits identified   Balance   Balance Comments 1 miss step going up stairs however no LOB during ambulation, x4 stairs up/down, 100' amb no AD no LOB   Activities of Daily Living   BADL Assessment/Intervention no deficits identified  (supervision 2/2 seizure precautions\)   Clinical Impression   Criteria for Skilled Therapeutic Interventions Met (OT) Yes, treatment indicated   OT Diagnosis cog deficits   Influenced by the following impairments seizure   OT Problem List-Impairments impacting ADL problems related to;cognition   Assessment of Occupational Performance 1-3 Performance Deficits   Identified Performance Deficits cognition   Planned Therapy Interventions (OT) cognition   Clinical Decision Making Complexity (OT) problem focused assessment/low complexity   Risk & Benefits of therapy have been explained evaluation/treatment results reviewed;risks/benefits reviewed;care plan/treatment goals reviewed;current/potential barriers reviewed;participants voiced agreement with care plan;participants included;patient   OT Total Evaluation Time   OT Eval, Low Complexity Minutes (37275) 17   Therapy Certification   Medical Diagnosis seizure, cognitive deficits   Start of Care Date 05/05/25   Certification date from 05/05/25   Certification date to 05/08/25   OT Goals   Therapy Frequency (OT) 5 times/week   OT Predicted Duration/Target Date for Goal Attainment 05/08/25   OT Goals OT Goal 1;Cognition   OT: Cognitive Patient/caregiver will verbalize understanding of cognitive assessment results/recommendations as needed for safe discharge  planning   OT: Goal 1 Pt will complete med set up task w/ 100% accuracy.   Interventions   Interventions Quick Adds Self-Care/Home Management   Self-Care/Home Management   Self-Care/Home Mgmt/ADL, Compensatory, Meal Prep Minutes (96362) 12   Treatment Detail/Skilled Intervention OT: Pt engaged in education regarding results of SLUMS cognitive assessment. Pt educated on importance of medication compliance, pt wondering why she had a seizure after only missing a few doses however chart is inconsistent w/ patient report of a few days vs a few months of not taking medications. Education on having more support w/ medications and IADLs. Pt open to conversation today but will need reinforcement. Education on tasks to continue to keep memory and cogntion sharp at home.   OT Discharge Planning   OT Plan med set up task   OT Discharge Recommendation (DC Rec) home with outpatient occupational therapy;home with assist   OT Rationale for DC Rec Pt functioning near baseline w/ mobility and likely cognition however scoring 17/30 on SLUMS on 5/5/25, poor STM and recall and moderate difficulty w/ clock drawing and problem solving. Pt would benefit from returning home w/ OP follow up for cognition (OT and OP neuro psych testing), and assist for higher level IADL tasks such as med set up from daughter and closer monitoring of this. Recommend pt have assist w/ driving until cleared by follow up driving assessment or cognitive assessment.   OT Brief overview of current status Goals of therapy will be to address safe mobility and make recs for d/c to next level of care. Pt and RN will continue to follow all falls risk precautions as documented by RN staff while hospitalized  (SBA mobility, seizure precautions, 17/30 SLUMS)   OT Total Distance Amb During Session (feet) 100   Total Session Time   Timed Code Treatment Minutes 12   Total Session Time (sum of timed and untimed services) 29       [facility]                                                                                    OUTPATIENT OCCUPATIONAL THERAPY    PLAN OF TREATMENT FOR OUTPATIENT REHABILITATION   Patient's Last Name, First Name, UMESHScottBita Woods YOB: 1950   Provider's Name   [facility]   Medical Record No.  [id number]     Onset Date: 05/03/25 Start of Care Date: 05/05/25     Medical Diagnosis:  seizure, cognitive deficits               OT Diagnosis:  cog deficits Certification Dates:  From: 05/05/25  To: 05/08/25     See note for plan of treatment, functional goals, and certification details.    I CERTIFY THE NEED FOR THESE SERVICES FURNISHED UNDER        THIS PLAN OF TREATMENT AND WHILE UNDER MY CARE (Physician co-signature of this document indicates review and certification of the therapy plan).

## 2025-05-05 NOTE — CONSULTS
[facility]    Stroke Consult Note    Reason for Consult:  stroke        HPI  Bita Stephenson is a 75 year old female with past medical history significant for epilepsy, memory impairment.  She was admitted to [facility] 5/3/2025 for evaluation of suspected seizure at home and postictal state.  Per notes, she is not compliant with home Keppra regimen.  She was initially planned for discharge, but became febrile and due to suspected aspiration pneumonitis, was transferred to [facility] for further evaluation.  Stroke team was consulted for evaluation of MRI findings.      Today on exam, she reports feeling at baseline. She said she missed half of her Keppra doses last week.     Stroke Evaluation Summarized    MRI/Head CT MRI brain:  1.  Tiny punctate focus of increased T2 signal within the right cerebellum without definitive diminished ADC signal. This finding may reflect artifact or a small acute/subacute infarct.  2.  Thin CSF density extra-axial/subdural fluid collection along the left lateral cerebral convexity measuring up to 7 mm in greatest transverse dimensions, new since prior head CT dated 05/03/2025. This collection likely reflects a small subdural   hygroma. There is a small focus of susceptibility artifact noted along the anterior margin of the subdural collection, possibly reflecting a small focus of blood product versus mineralization.  3.  Mild diffuse dural thickening/enhancement. This finding is nonspecific and may be idiopathic, but can also be seen in the setting of intracranial hypotension, recent lumbar puncture, infectious/inflammatory processes or even malignancy.  4.  Chronic bilateral cerebellar and thalamic lacunar type infarctions.  5.  Moderate presumed chronic small vessel ischemic change.  6.  Nonenhancing expansile cystic lesion within the right of anterior maxilla measuring up to 3.1 cm. This finding is nonspecific and may reflect an odontogenic keratocyst,  radicular cyst, atypical ameloblastoma, or even atypical odontogenic myxoma, among other etiologies. Recommend CT face and oral maxillofacial surgical consultation for further assessment.  7.  Focal lobularity of the left proximal M2 posterior division middle cerebral artery measuring up to 4 mm. This finding may reflect a small aneurysm or vascular tortuosity. Recommend follow-up CTA brain for further assessment.   Intracranial Vasculature Short segment of aneurysmal dilatation involving the proximal aspect of a left MCA M2 posterior division branch measuring approximately 4 mm in diameter. Tiny broad-based laterally directed outpouching associated with the right cavernous ICA segment with faint calcification along its margins. This may represent irregular atherosclerotic disease or a tiny sessile aneurysm.   Cervical Vasculature No significant stenosis. 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 Multiple small avidly enhancing nodules along the posterior aspect of the superficial lobe of the left parotid gland      Echocardiogram EF 65%, left atrium moderately dilated   EKG/Telemetry Sinus tachycardia   Other Testing CT CAP: no definite evidence of malignancy     LDL  5/5/2025: 97 mg/dL   A1C  5/5/2025: 5.1 %   Troponin No lab value available in past 48 hrs       Impression & Plan  1.  Subacute appearing right cerebellar infarct due to embolic stroke of undetermined source   2.  Subdural hygroma along the left cerebral hemisphere, stable on subsequent imaging  3.  Lytic lesion within the right anterior maxilla   4.  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  Suspected seizure in the setting of poor adherence to Keppra  - Appreciate seizure recommendations per general neurology, continued on Keppra 500 mg twice daily  - Discussed [place] state driving restrictions    Patient Follow-up    - in 6-8 weeks with general neurology or stroke MISTY ([phone])  - Follow up with PCP for parotid lesions  - With neurosurgery in 1 month for follow-up of hygroma and L M2 dilation (order placed)    Thank you for this consult. No further stroke evaluation is recommended, so we will sign off. Please contact us with any additional questions.    Racheal Ramey, CNP  Vascular Neurology    To page me or covering stroke neurology team member, click here: AMCOM  Choose \"On Call\" tab at top, then select \"NEUROLOGY/ALL SITES\" from middle drop-down box, press Enter, then look for \"stroke\" or \"telestroke\" for your site.  _____________________________________________________    Clinically Significant Risk Factors Present on Admission           # Hypocalcemia: Lowest Ca = 8.3 mg/dL in last 2 days, will monitor and replace as appropriate         # Hypertension: Noted on problem list        # Anemia: based on hgb <11           # Financial/Environmental Concerns: none         Past Medical History    No past medical history on file.  Medications   Home Meds  Prior to Admission medications    Medication Sig Start Date End Date Taking? Authorizing Provider   amLODIPine (NORVASC) 10 MG tablet Take 10 mg by mouth daily  1/30/18   Provider, Historical   famotidine (PEPCID) " 20 MG tablet Take 20 mg by mouth 2 times daily as needed    Unknown, Entered By History   levETIRAcetam (KEPPRA) 500 MG tablet Take 1 tablet (500 mg) by mouth 2 times daily. 5/3/25 6/2/25  Ofe Robbins MD   levETIRAcetam (KEPPRA) 500 MG tablet Take 1 tablet (500 mg) by mouth 2 times daily 4/17/22   Adrian Enriquez DO   melatonin 3 MG tablet Take 3 mg by mouth nightly as needed for sleep    Unknown, Entered By History       Scheduled Meds  Current Facility-Administered Medications   Medication Dose Route Frequency Provider Last Rate Last Admin    amLODIPine (NORVASC) tablet 10 mg  10 mg Oral Daily Rodriguez, Christiano Mccarty MD   10 mg at 05/05/25 0843    [START ON 5/6/2025] aspirin (ASA) chewable tablet 81 mg  81 mg Oral Daily Racheal Ramey CNP        azithromycin (ZITHROMAX) tablet 250 mg  250 mg Oral Daily Rodriguez, Christiano Mccarty MD   250 mg at 05/05/25 0843    [START ON 5/6/2025] cefdinir (OMNICEF) capsule 300 mg  300 mg Oral Daily Dom Doll MD        cyanocobalamin (VITAMIN B-12) tablet 100 mcg  100 mcg Oral Daily Dom Doll MD   100 mcg at 05/05/25 0843    levETIRAcetam (KEPPRA) tablet 500 mg  500 mg Oral BID Rodriguez, Christiano Mccarty MD   500 mg at 05/05/25 0843    sodium chloride (PF) 0.9% PF flush 3 mL  3 mL Intracatheter Q8H Rodriguez, Christiano Mccarty MD   3 mL at 05/05/25 0844       Infusion Meds  Current Facility-Administered Medications   Medication Dose Route Frequency Provider Last Rate Last Admin       Allergies   Allergies   Allergen Reactions    Paroxetine Itching     tingling    Sulfa Antibiotics      Other reaction(s): *Unknown    Valium [Diazepam] Itching          PHYSICAL EXAMINATION   Temp:  [98.5  F (36.9  C)-99.8  F (37.7  C)] 99.8  F (37.7  C)  Pulse:  [85-95] 95  Resp:  [18] 18  BP: (134-166)/(77-95) 146/86  SpO2:  [97 %-99 %] 99 %    Neurologic  Mental Status:  alert, follows commands, speech clear and fluent, naming and repetition normal  Cranial Nerves:  visual fields  intact, EOMI with normal smooth pursuit, facial movements symmetric, hearing not formally tested but intact to conversation, no dysarthria, tongue protrusion midline  Motor:  normal muscle tone and bulk, no abnormal movements, able to move all limbs spontaneously, no pronator drift  Reflexes:   deferred  Sensory:  light touch sensation intact and symmetric throughout upper and lower extremities, no extinction on double simultaneous stimulation   Coordination:  normal finger-to-nose and heel-to-shin bilaterally without dysmetria  Station/Gait:  deferred    Stroke Scales    NIHSS  1a. Level of Consciousness 0-->Alert, keenly responsive   1b. LOC Questions 0-->Answers both questions correctly   1c. LOC Commands 0-->Performs both tasks correctly   2.   Best Gaze 0-->Normal   3.   Visual 0-->No visual loss   4.   Facial Palsy 0-->Normal symmetrical movements   5a. Motor Arm, Left 0-->No drift, limb holds 90 (or 45) degrees for full 10 secs   5b. Motor Arm, Right 0-->No drift, limb holds 90 (or 45) degrees for full 10 secs   6a. Motor Leg, Left 0-->No drift, leg holds 30 degree position for full 5 secs   6b. Motor Leg, right 0-->No drift, leg holds 30 degree position for full 5 secs   7.   Limb Ataxia 0-->Absent   8.   Sensory 0-->Normal, no sensory loss   9.   Best Language 0-->No aphasia, normal   10. Dysarthria 0-->Normal   11. Extinction and Inattention  0-->No abnormality   Total 0 (05/05/25 1741)       Imaging  I personally reviewed all imaging; relevant findings per HPI.    Labs Data   CBC  Recent Labs   Lab 05/05/25  0623 05/04/25  0651 05/03/25  1131   WBC 8.7 8.3 12.8*   RBC 4.04 4.04 4.32   HGB 10.9* 11.1* 11.7   HCT 34.3* 34.0* 35.8    194 230     Basic Metabolic Panel   Recent Labs   Lab 05/05/25  0623 05/04/25  0651 05/03/25  1131    137 140   POTASSIUM 4.0 4.2 4.4   CHLORIDE 104 104 106   CO2 18* 21* 18*   BUN 26.5* 24.2* 27.9*   CR 1.34* 1.51* 1.65*   GLC 73 105* 148*   TROY 8.3* 8.4* 9.2      Liver Panel  Recent Labs   Lab 05/04/25  0651   PROTTOTAL 6.2*   ALBUMIN 3.7   BILITOTAL 0.3   ALKPHOS 63   AST 21   ALT 13     INR    Recent Labs   Lab Test 04/14/22  1535 04/14/22  0917   INR 0.98 0.96           Stroke Consult Data Data   This was a non-emergent, non-telestroke consult.  I have personally spent a total of 25 minutes providing care today, time spent in reviewing medical records and devising the plan as recorded above.

## 2025-05-05 NOTE — PROVIDER NOTIFICATION
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    Noncontrast head CT ordered STAT to evaluate possible foci of blood  CTA head and neck ordered routine.  There was some concern with patient's creatinine clearance per radiology, so can await a.m. BMP prior to pursuing this    Neurology service following and made aware of MRI findings, they requested stroke consult for a.m. which has been ordered.    Christiano Rodriguez MD  1:47 AM

## 2025-05-05 NOTE — PLAN OF CARE
Goal Outcome Evaluation:      Plan of Care Reviewed With: patient          Outcome Evaluation: Home with family and outpatient OT at discharge.

## 2025-05-05 NOTE — CONSULTS
Care Management Initial Consult    General Information  Assessment completed with: Patient, VM-chart review,    Type of CM/SW Visit: Initial Assessment    Primary Care Provider verified and updated as needed: Yes   Readmission within the last 30 days: no previous admission in last 30 days      Reason for Consult: discharge planning  Advance Care Planning: Advance Care Planning Reviewed: no concerns identified          Communication Assessment  Patient's communication style: spoken language (English or Bilingual)             Cognitive  Cognitive/Neuro/Behavioral: WDL  Level of Consciousness: alert  Arousal Level: opens eyes spontaneously  Orientation: oriented x 4  Mood/Behavior: cooperative, calm  Best Language: 0 - No aphasia  Speech: clear    Living Environment:   People in home: child(meli), adult, grandchild(meli)  Lives with daughter Alice  Current living Arrangements: other (see comments) (mobile home)      Able to return to prior arrangements:         Family/Social Support:  Care provided by: self  Provides care for:    Marital Status:   Support system: Children, Other (specify) (grandchildren)          Description of Support System: Supportive, Involved         Current Resources:   Patient receiving home care services: No        Community Resources: None  Equipment currently used at home: none  Supplies currently used at home:      Employment/Financial:  Employment Status: retired        Financial Concerns: none   Referral to Financial Worker: No       Does the patient's insurance plan have a 3 day qualifying hospital stay waiver?  No    Lifestyle & Psychosocial Needs:  Social Drivers of Health     Food Insecurity: Not on File (9/26/2024)    Received from [employer]    Food Insecurity     Food: 0   Depression: Not at risk (8/27/2024)    Received from [employer] [facility]    PHQ-2     PHQ-2 TOTAL SCORE: 0   Housing Stability: Not on File (2/19/2024)    Received from [employer]  Stability     Housin   Tobacco Use: Medium Risk (2024)    Received from [employer] [facility]    Patient History     Smoking Tobacco Use: Former     Smokeless Tobacco Use: Never     Passive Exposure: Not on file   Financial Resource Strain: Not on File (2024)    Received from [employer]    Financial Resource Strain     Financial Resource Strain: 0   Alcohol Use: Not on file   Transportation Needs: Not on File (2024)    Received from [employer]    Transportation Needs     Transportation: 0   Physical Activity: Not on File (2024)    Received from [employer]    Physical Activity     Physical Activity: 0   Interpersonal Safety: Not on file   Stress: Not on File (2024)    Received from [employer]    Stress     Stress: 0   Social Connections: Not on File (9/15/2024)    Received from [employer]    Social Connections     Connectedness: 0   Health Literacy: Not on file       Functional Status:  Prior to admission patient needed assistance:   Dependent ADLs:: Independent  Dependent IADLs:: Independent       Mental Health Status:  Mental Health Status: No Current Concerns       Chemical Dependency Status:  Chemical Dependency Status: No Current Concerns             Values/Beliefs:  Spiritual, Cultural Beliefs, Anglican Practices, Values that affect care: no               Discussed  Partnership in Safe Discharge Planning  document with patient/family: Yes: patient    Additional Information:  Consult for discharge planning.  Met with patient and introduced self and role. Reviewed PACKER with patient.  Pt shared she has some memory loss.  Lives with her daughter, Alice, and Alice's children.  Pt shared she is independent in ADL's/IADL's prior to admission.    Discussed therapy recommending assistance with medication set up, meal prep and driving.  Pt stated she will be discussing with her daughter.  Discussed therapy recommending OP OT at discharge.    Verified primary care provider.    Next Steps: No  further care management intervention anticipated at this time.  Please re-consult if further needs arise.  Care management signing off.       Layne Melendez RN, BS  Care Coordinator  [email or web address]

## 2025-05-05 NOTE — PLAN OF CARE
Reason for Admission: Seizure, pos stroke    Cognitive/Mentation: A/Ox 4, forgetful  Neuros/CMS: Intact ex generalized weakness  VS: VSS on RA.   Tele: n/a.  /GI: Continent. Last BM 5/3.   Pulmonary: LS diminished.  Pain: Had a headache in the AM, Tylenol effective.     Drains/Lines: PIV SL  Skin: scattered bruising.  Activity: Assist x SBA with GB.  Diet: Regular with thin liquids. Takes pills whole with water.     Therapies recs: home with outpt therapy  Discharge: pending    Aggression Stoplight Tool: green    End of shift summary: CT done.

## 2025-05-06 ENCOUNTER — ORDERS ONLY (AUTO-RELEASED) (OUTPATIENT)
Dept: MEDSURG UNIT | Facility: CLINIC | Age: 75
End: 2025-05-06
Payer: COMMERCIAL

## 2025-05-06 ENCOUNTER — PATIENT OUTREACH (OUTPATIENT)
Dept: CARE COORDINATION | Facility: CLINIC | Age: 75
End: 2025-05-06
Payer: COMMERCIAL

## 2025-05-06 VITALS
DIASTOLIC BLOOD PRESSURE: 86 MMHG | HEIGHT: 63 IN | RESPIRATION RATE: 18 BRPM | SYSTOLIC BLOOD PRESSURE: 134 MMHG | BODY MASS INDEX: 20.88 KG/M2 | HEART RATE: 88 BPM | OXYGEN SATURATION: 97 % | TEMPERATURE: 98 F

## 2025-05-06 DIAGNOSIS — I63.9 ACUTE ISCHEMIC STROKE (H): ICD-10-CM

## 2025-05-06 DIAGNOSIS — I63.9 CEREBROVASCULAR ACCIDENT (CVA), UNSPECIFIED MECHANISM (H): ICD-10-CM

## 2025-05-06 LAB
ANION GAP SERPL CALCULATED.3IONS-SCNC: 12 MMOL/L (ref 7–15)
BUN SERPL-MCNC: 24.2 MG/DL (ref 8–23)
CALCIUM SERPL-MCNC: 8.5 MG/DL (ref 8.8–10.4)
CHLORIDE SERPL-SCNC: 102 MMOL/L (ref 98–107)
CREAT SERPL-MCNC: 1.43 MG/DL (ref 0.51–0.95)
EGFRCR SERPLBLD CKD-EPI 2021: 38 ML/MIN/1.73M2
GLUCOSE SERPL-MCNC: 185 MG/DL (ref 70–99)
HCO3 SERPL-SCNC: 23 MMOL/L (ref 22–29)
POTASSIUM SERPL-SCNC: 3.8 MMOL/L (ref 3.4–5.3)
SODIUM SERPL-SCNC: 137 MMOL/L (ref 135–145)

## 2025-05-06 PROCEDURE — 99239 HOSP IP/OBS DSCHRG MGMT >30: CPT | Performed by: HOSPITALIST

## 2025-05-06 PROCEDURE — G0378 HOSPITAL OBSERVATION PER HR: HCPCS

## 2025-05-06 PROCEDURE — 250N000013 HC RX MED GY IP 250 OP 250 PS 637: Performed by: NURSE PRACTITIONER

## 2025-05-06 PROCEDURE — 36415 COLL VENOUS BLD VENIPUNCTURE: CPT | Performed by: HOSPITALIST

## 2025-05-06 PROCEDURE — 250N000013 HC RX MED GY IP 250 OP 250 PS 637: Performed by: INTERNAL MEDICINE

## 2025-05-06 PROCEDURE — 250N000013 HC RX MED GY IP 250 OP 250 PS 637: Performed by: HOSPITALIST

## 2025-05-06 PROCEDURE — 80048 BASIC METABOLIC PNL TOTAL CA: CPT | Performed by: HOSPITALIST

## 2025-05-06 RX ORDER — AZITHROMYCIN 250 MG/1
250 TABLET, FILM COATED ORAL DAILY
Qty: 2 TABLET | Refills: 0 | Status: SHIPPED | OUTPATIENT
Start: 2025-05-07 | End: 2025-05-09

## 2025-05-06 RX ORDER — CEFDINIR 300 MG/1
300 CAPSULE ORAL DAILY
Qty: 4 CAPSULE | Refills: 0 | Status: SHIPPED | OUTPATIENT
Start: 2025-05-07 | End: 2025-05-11

## 2025-05-06 RX ORDER — ACETAMINOPHEN 325 MG/1
650 TABLET ORAL EVERY 4 HOURS PRN
COMMUNITY
Start: 2025-05-06

## 2025-05-06 RX ORDER — ASPIRIN 81 MG/1
81 TABLET, CHEWABLE ORAL DAILY
COMMUNITY
Start: 2025-05-07

## 2025-05-06 RX ORDER — ATORVASTATIN CALCIUM 40 MG/1
40 TABLET, FILM COATED ORAL EVERY EVENING
Qty: 90 TABLET | Refills: 0 | Status: SHIPPED | OUTPATIENT
Start: 2025-05-06

## 2025-05-06 RX ADMIN — VITAM B12 100 MCG: 100 TAB at 08:55

## 2025-05-06 RX ADMIN — ASPIRIN 81 MG CHEWABLE TABLET 81 MG: 81 TABLET CHEWABLE at 08:55

## 2025-05-06 RX ADMIN — CEFDINIR 300 MG: 300 CAPSULE ORAL at 08:55

## 2025-05-06 RX ADMIN — AZITHROMYCIN DIHYDRATE 250 MG: 250 TABLET ORAL at 08:55

## 2025-05-06 RX ADMIN — LEVETIRACETAM 500 MG: 500 TABLET, FILM COATED ORAL at 08:55

## 2025-05-06 RX ADMIN — AMLODIPINE BESYLATE 10 MG: 10 TABLET ORAL at 08:55

## 2025-05-06 ASSESSMENT — ACTIVITIES OF DAILY LIVING (ADL)
ADLS_ACUITY_SCORE: 66
ADLS_ACUITY_SCORE: 66
ADLS_ACUITY_SCORE: 72
ADLS_ACUITY_SCORE: 72
ADLS_ACUITY_SCORE: 66
ADLS_ACUITY_SCORE: 72
ADLS_ACUITY_SCORE: 66

## 2025-05-06 NOTE — PLAN OF CARE
Occupational Therapy Discharge Summary    Reason for therapy discharge:    Discharged to home with outpatient therapy.    Progress towards therapy goal(s). See goals on Care Plan in [facility] electronic health record for goal details.  Goals partially met.  Barriers to achieving goals:   discharge from facility.    Therapy recommendation(s):      Continued therapy is recommended.  Rationale/Recommendations:   .OT Rationale for DC Rec: Pt functioning near baseline w/ mobility and likely cognition however scoring 17/30 on SLUMS on 5/5/25, poor STM and recall and moderate difficulty w/ clock drawing and problem solving. Pt would benefit from returning home w/ OP follow up for cognition (OT and OP neuro psych testing), and assist for higher level IADL tasks such as med set up from daughter and closer monitoring of this. Recommend pt have assist w/ driving until cleared by follow up driving assessment or cognitive assessment.

## 2025-05-06 NOTE — DISCHARGE SUMMARY
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  Follow up with neurosurgery in 1 month for follow-up of hygroma and L M2 dilation.  Follow up with oral surgery regarding right anterior maxillary lesion.  Follow up with ENT regarding parotid nodules and suspected left sided vagal paraganglioma.  Referrals have been placed for these follow up appointments, however you may choose to schedule appointments closer to your home if desired.        Hospital Follow-up with Existing Primary Care Provider (PCP)          Schedule Primary Care visit within: 14 Days   Recommended labs and Imaging (to be ordered by Primary Care Provider): CBC and BMP             Unresulted Labs Ordered in the Past 30 Days of this Admission       Date and Time Order Name Status Description    5/3/2025  6:46 PM Blood Culture Peripheral Blood Preliminary     5/3/2025  6:46 PM Blood Culture Peripheral Blood Preliminary         These results will be followed up by Hospitalist Service.    Discharge Disposition   Discharged to home  Condition at  "discharge: Stable    Hospital Course   Bita Stephenson is a 75 year old female with a history of seizures, memory impairment admitted on 5/3/2025. She presentd as a direct admission from [facility] emergency department after likely seizure event at home and subsequent postictal state.  Nonadherent to Keppra with known epilepsy.  Initial plan had been for discharge, but patient subsequently febrile with concern for aspiration pneumonia given initial presentation.    Epilepsy  History of generalized tonic-clonic as well as subclinical partial seizures.  Follows with (or at least had followed with) Dr Rwoe of [facility].  Seizure with postictal state: In the setting of Keppra nonadherence.  Admitted to inpatient.  Loaded with Keppra 1 g at [facility] prior to transfer.  Continue Keppra 500 mg twice daily.  Discussed importance of continuing Keppra and not missing any doses.  Note that no Keppra level was drawn at outside hospital, but patient has not filled her Keppra prescription since January.  Neurology consulted, appreciate their assistance.  EEG on 5/4/25 showed findings consistent with moderate diffuse encephalopathy, no electrographic seizures or epileptiform discharges.  Follow up with Neurology in 6-8 weeks.  Discussed driving restrictions following seizure.    Possible small acute/subacute infarct in the right cerebellum  MRI brain on 5/4/25 showed \"Tiny punctate focus of increased T2 signal within the right cerebellum without definitive diminished ADC signal. This finding may reflect artifact or a small acute/subacute infarct.\"  Stroke Neurology consulted, appreciate their assistance.  Started on aspirin and atorvastatin.  Follow up in Neurology Clinic in 6-8 weeks.  Outpatient Zio patch ordered per Neurology recommendation.    Maxillary lesion  Patient has noted a lump in the anterior right hard palate over the past year or so, states it is getting larger.   MRI brain showed: \"Nonenhancing expansile " "cystic lesion within the right of anterior maxilla measuring up to 3.1 cm.\"  CT facial bones on 5/5/25 showed \"Redemonstration of a lobulated cystic and lytic lesion involving the anterior right maxilla as detailed above and on prior brain MRI. Lesion demonstrates multiple areas of cortical thinning and dehiscence with areas of faint mineralization suspected   along the medial margin and lateral margin to lesser degree. No significant arterial enhancement is seen associated with lesion on contemporaneous head and neck CTA. Lesion was present on study from April 2022 in retrospect, though has demonstrated progressive growth in the interim. Again, primary diagnostic considerations would include odontogenic keratocyst, radicular cyst, atypical ameloblastoma, or atypical odontogenic myxoma and OMFS consultation is advised.\"  Discussed with oral surgery on 5/5/2025, recommended outpatient follow-up.     Left lateral subdural hygroma  Noted on imaging during this admission.  Neurology and Stroke Neurology consulted as noted above.  Follow up with Neurosurgery regarding further evaluation and management.    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  Follow up with Neurosurgery regarding further evaluation and management.    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  Follow up with ENT for further evaluation and management.    Left parotid gland nodules  CTA neck on 5/5/25 showed: \"Multiple small avidly enhancing nodules along the posterior " "aspect of the superficial lobe of the left parotid gland, which are similar to slightly increased in size dating back to April 2022.\"  Follow up with ENT for further evaluation and management, consideration of FNA.    Cognitive impairment, suspect undiagnosed dementia  Lives with her daughter, Alice, who has noted that patient has intact distant memories, but has struggled somewhat with recalling short-term events.  At times will forget if she ate a meal or not, recently could not recall that a niece's boyfriend had visited the house or who he was.  Highly suspicious for dementing illness, either vascular dementia or progressive dementia such as Alzheimer's.  Did not recall that she has had seizures in the past despite requiring intubation and hospitalization in 2022.  Mini cog of 3 in August 2024 through primary care team, but declined a formal cognitive evaluation at that time.  Even at her Medicare visit in 2023 she had some memory complaints.  OT consulted for cognitive evaluation.  Scored 17/30 on SLUMS testing.  Patient currently lives with her daughter, Alice.  Pending degree of supervision needed and able to be provided by family, may need social work consultation for disposition planning.  Recommend assistance with medication set up/monitoring.  Hopefully this can be performed with family assistance at home.  Outpatient neurocognitive assessment if patient willing to complete.  B12 low end of normal, started on B12 supplement.  HIV non-reactive.  TSH within normal limits. Treponema antibodies non-reactive.  Neurology consulted as above.    Suspected aspiration pneumonia  Patient with frequent coughing, found postictal with emesis.  No hypoxia and no infiltrate on chest x-ray, but also possible ANNIKA.  Febrile to 102.8 Fahrenheit at outside emergency department, leukocytosis of 12.8.  Aspiration pneumonitis could have resulted in similar findings, but typically would anticipate fever to be more proximal to " aspiration event rather than 8 hours after aspiration.  Received an initial dose of Zosyn in outside emergency department, not continued at [facility], treating here with cefdinir and azithromycin.  Upon discharge, continue azithromycin for another two days and omnicef for another four days to complete course of antibiotics.  Blood cultures x 2 pending from outside emergency department, still negative at the time of discharge.    Stage IIIb chronic kidney disease  Creatinine of 1.65 on 5/3/25, previously 1.15 in 2022, but by review of outside records more recently has been in the 1.8-2 range through her primary system.  Recent renal ultrasound from 2023 demonstrated small and mildly echogenic kidneys.  As such, current creatinine could represent progression of chronic kidney disease.  Reports eating and drinking normally in the preceding days.  Received 1 L lactated ringer bolus at time of admission.  Creatinine improved, 1.43 on 5/6/25.  Recheck BMP at outpatient follow up appointment.    Suspected moderate to severe protein calorie malnutrition  Daughter describes ongoing weight loss over the past 2 years, but cannot estimate how much weight patient has lost.  Apparently appears to be eating and drinking normally per daughter as well as patient's own report.  This said, daughter also tells me that she will occasionally forget if she has eaten or not.  Suspect that cognitive decline is contributing to weight loss.  By review of outside chart, it appears that she lost nearly 20 pounds between August 2023 and August 2024.  Regular adult diet.  Daily weights.  Cognitive assessment via Occupational Therapy as above.  Patient would likely benefit from increased supervision either at home or in a facility if she is missing meals secondary to memory impairment, scheduled meals with family members or friends for social aspect of eating.  Monitor for symptoms associated with eating.  Patient reports none currently.  Albumin  3.7 on 5/4/25.  Ongoing age-appropriate cancer screening through primary care.    Anion gap metabolic acidosis, resolved  Suspect secondary to seizure event.  Received 1 L lactated ringer bolus.  Encourage oral intake.  Recheck labs at outpatient follow up.    Hypertension  Continue PTA Norvasc 10 mg daily.    Marijuana use  Smokes marijuana regularly per daughter.  Noted.    History of alcohol dependence in sustained remission  Sober since 2018 by outside record review.    Consultations This Hospital Stay   OCCUPATIONAL THERAPY ADULT IP CONSULT  NEUROLOGY IP CONSULT  CARE MANAGEMENT / SOCIAL WORK IP CONSULT  NEUROLOGY IP STROKE CONSULT    Code Status   No CPR- Pre-arrest intubation OK    Time Spent on this Encounter   I, Dom Doll MD, personally saw the patient today and spent greater than 30 minutes discharging this patient.       Dom Doll MD  [facility]  [address] [place] [address]  Phone: [phone]  ______________________________________________________________________    Physical Exam   Vital Signs: Temp: 98  F (36.7  C) Temp src: Oral BP: 134/86 Pulse: 88   Resp: 18 SpO2: 97 % O2 Device: None (Room air)    Weight: 0 lbs 0 oz  Constitutional: awake, alert, cooperative, no apparent distress, laying in the hospital bed  Respiratory: no increased work of breathing, clear to auscultation bilaterally, no crackles or wheezing  Cardiovascular: regular rate and rhythm, normal S1 and S2, no murmur noted  GI: normal bowel sounds, soft, non-distended, non-tender  Skin: warm, dry  Musculoskeletal: no lower extremity pitting edema present  Neurologic: awake, alert, answers questions appropriately, moves all extremities       Primary Care Physician   Yi Wade    Discharge Orders      Adult Neurology  Referral      Adult Neurosurgery  Referral      Occupational Therapy  Referral      Adult ENT  Referral      Oral  Surgery Referral      Reason for your hospital stay    Seizure.  Subacute appearing right cerebellar infarct due to embolic stroke of undetermined source.  Subdural hygroma along the left cerebral hemisphere, stable on subsequent imaging.  Lytic lesion within the right anterior maxilla.  Short segment aneurysmal dilation of the proximal left M2 posterior division branch measuring 4 mm in diameter.  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     Activity    Your activity upon discharge: activity as tolerated.  No driving for at least 3 months and until cleared by outpatient provider.     Follow Up    Follow up in 6-8 weeks with general neurology or stroke MISTY ([phone]).  Follow up with neurosurgery in 1 month for follow-up of hygroma and L M2 dilation.  Follow up with oral surgery regarding right anterior maxillary lesion.  Follow up with ENT regarding parotid nodules and suspected left sided vagal paraganglioma.  Referrals have been placed for these follow up appointments, however you may choose to schedule appointments closer to your home if desired.     Diet    Follow this diet upon discharge: Regular Diet Adult     Stroke Hospital Follow Up (for neurologist use only)    [facility] will call you to coordinate care as prescribed by your provider. If you don t hear from a representative within 2 business days, please call [phone].       Hospital Follow-up with Existing Primary Care Provider (PCP)          ZIO PATCH MAIL OUT     ZIO PATCH MAIL OUT     Significant Results and Procedures   Most Recent 3 CBC's:  Recent Labs   Lab Test 05/05/25  0623 05/04/25  0651 05/03/25  1131   WBC 8.7 8.3 12.8*   HGB 10.9* 11.1* 11.7   MCV 85 84 83    194 230     Most Recent 3 BMP's:  Recent Labs   Lab Test 05/06/25  0835 05/05/25  0623 05/04/25  0651    137 137   POTASSIUM 3.8 4.0 4.2   CHLORIDE 102 104 104   CO2 23 18* 21*   BUN 24.2* 26.5* 24.2*   CR 1.43* 1.34* 1.51*   ANIONGAP 12 15 12   TROY 8.5* 8.3* 8.4*    * 73 105*     Results for orders placed or performed during the hospital encounter of 05/03/25   MR Brain w/o & w Contrast    Narrative    EXAM: MR BRAIN W/O AND W CONTRAST  LOCATION: [facility]  DATE: 5/4/2025    INDICATION: Seizure, prior history of 1 unprovoked seizure.  COMPARISON: CT head dated 05/03/2025. MRI brain dated 04/14/2022.  CONTRAST: 5 ml Gadavist.  TECHNIQUE: 1.5 Carmela multiplanar multisequence head MRI without and with intravenous contrast according to the seizure protocol.    FINDINGS:  INTRACRANIAL CONTENTS: Dedicated imaging of the temporal lobes demonstrates symmetrical size and signal intensity of the mesial temporal structures including the hippocampus. Tiny bilateral parahippocampal cysts are incidentally noted. No malformations   of cortical development.     There is a tiny punctate focus of increased DWI signal in the right cerebellum (image 7 series 11,002) without definitive diminished ADC signal. This finding may reflect artifact or a small acute/subacute infarct. No abnormal diffusion-weighted signal   noted elsewhere. There are chronic bilateral cerebellar lacunar-type infarcts. Additionally, there are chronic bilateral thalamic lacunar type infarctions.     No mass identified.     There is a thin CSF density extra-axial/subdural fluid collection noted along the left lateral cerebral convexity measuring up to 7 mm in greatest transverse dimensions, new since prior head CT dated 05/03/2025. This collection likely reflects a small   subdural hygroma. There is a small focus of susceptibility artifact noted along the anterior margin of the subdural collection, possibly reflecting a small focus of blood product versus mineralization.    Patchy and confluent nonspecific T2/FLAIR hyperintensities within the cerebral white matter most consistent with moderate chronic microvascular ischemic change. Mild generalized cerebral atrophy. No hydrocephalus.  Normal  position of the cerebellar   tonsils. There is mild thin diffuse dural thickening/enhancement.    SELLA: No abnormality accounting for technique.    OSSEOUS STRUCTURES/SOFT TISSUES: There is a nonenhancing expansile cystic lesion within the right anterior maxilla demonstrating T1 iso to hypointense signal and T2 hyperintense signal measuring approximately 3.1 x 2.6 cm in greatest axial dimensions and   approximately 1.9 cm in craniocaudal dimensions. Previously this lesion measured approximately 2.4 x 1.6 in greatest axial dimensions (on MRI brain dated 04/14/2022).    The major intracranial flow voids are maintained. There is focal lobularity of the left proximal M2 posterior division middle cerebral artery measuring up to 4 x 4 mm in greatest axial dimensions (image 77 series 16,001).     ORBITS: No abnormality accounting for technique.     SINUSES/MASTOIDS: No paranasal sinus mucosal disease. A single right inferior mastoid air cell is opacified.       Impression    IMPRESSION:  1.  Tiny punctate focus of increased T2 signal within the right cerebellum without definitive diminished ADC signal. This finding may reflect artifact or a small acute/subacute infarct.  2.  Thin CSF density extra-axial/subdural fluid collection along the left lateral cerebral convexity measuring up to 7 mm in greatest transverse dimensions, new since prior head CT dated 05/03/2025. This collection likely reflects a small subdural   hygroma. There is a small focus of susceptibility artifact noted along the anterior margin of the subdural collection, possibly reflecting a small focus of blood product versus mineralization.  3.  Mild diffuse dural thickening/enhancement. This finding is nonspecific and may be idiopathic, but can also be seen in the setting of intracranial hypotension, recent lumbar puncture, infectious/inflammatory processes or even malignancy.  4.  Chronic bilateral cerebellar and thalamic lacunar type infarctions.  5.   Moderate presumed chronic small vessel ischemic change.  6.  Nonenhancing expansile cystic lesion within the right of anterior maxilla measuring up to 3.1 cm. This finding is nonspecific and may reflect an odontogenic keratocyst, radicular cyst, atypical ameloblastoma, or even atypical odontogenic myxoma,   among other etiologies. Recommend CT face and oral maxillofacial surgical consultation for further assessment.  7.  Focal lobularity of the left proximal M2 posterior division middle cerebral artery measuring up to 4 mm. This finding may reflect a small aneurysm or vascular tortuosity. Recommend follow-up CTA brain for further assessment.            Dr. Erich Lim discussed results with Dr. RICHARD SEQUEIRA on 05/05/2025 at 12:34 AM CDT.     CT Head w/o Contrast    Narrative    EXAM: CT HEAD W/O CONTRAST  LOCATION: [facility]  DATE: 5/5/2025    INDICATION: Eval for blood product in hygroma.  COMPARISON: 5/4/2025, 5/3/2025.  TECHNIQUE: Routine CT Head without IV contrast. Multiplanar reformats. Dose reduction techniques were used.    FINDINGS:  INTRACRANIAL CONTENTS: Stable subdural hygroma along the left cerebral hemisphere. No acute hemorrhage or adverse mass effect. Redemonstrated chronic infarcts in the cerebellum and thalami. No large territorial gray-white differentiation loss. Moderate   presumed chronic small vessel ischemic changes. Mild generalized volume loss. No hydrocephalus.     VISUALIZED ORBITS/SINUSES/MASTOIDS: No intraorbital abnormality. No paranasal sinus mucosal disease. Small right mastoid effusion.    BONES/SOFT TISSUES: Partially visualized lytic lesion in the right maxilla is again noted. Indeterminate nodule in the left carotid versus parapharyngeal space is incompletely evaluated, measuring approximately 1.4 x 1.6 cm in diameter.      Impression    IMPRESSION:  1.  Stable subdural hygroma along the left cerebral hemisphere. No acute hemorrhage.  2.  Indeterminate  nodule in the left carotid versus parapharyngeal space is incompletely evaluated.   3.  Redemonstrated lytic lesion in the right maxilla.  4.  Consider further evaluation with nonemergent contrast-enhanced neck CT.     CT Facial Bones without Contrast    Narrative    EXAM: CT FACIAL BONES WITHOUT CONTRAST  LOCATION: [facility]  DATE: 5/5/2025    INDICATION: evaluate maxillary abnormality seen on MRI  COMPARISON: Contemporaneous head/neck CTA. Head/neck CTA: 4/14/2022. Brain MRI: 5/4/2025.  TECHNIQUE: Routine CT Maxillofacial without IV contrast. Multiplanar reformats. Dose reduction techniques were used.     FINDINGS:  OSSEOUS STRUCTURES/SOFT TISSUES: As seen on recent head CT and brain MRI, there is a lobulated predominantly low-attenuation lytic lesion involving the right aspect of the maxilla, which measures approximately 27 x 26 x 23 mm (AP by TV by cc). A few   punctate foci of hyperattenuation are seen along the lesion margins medially and laterally to a lesser degree. No significant arterial enhancement is seen associated with this lesion on contemporaneous head and neck CTA. Lobulated bony remodeling is seen   along the lesion margins with multiple areas of cortical thinning and dehiscence. This lesion was present to some degree on study performed in April 2022, though has increased in size in the interim. A portion of lesion appears centered about the apices   of both maxillary central incisor teeth as well as the right lateral incisor and right cuspid teeth.    No significant localized soft tissue swelling/inflammation. No acute facial bone fracture or malalignment.    ORBITAL CONTENTS: No acute abnormality.    SINUSES: No significant paranasal sinus mucosal disease.    VISUALIZED INTRACRANIAL CONTENTS: No acute abnormality. Small right mastoid effusion, nonspecific.      Impression    IMPRESSION:     1.  Redemonstration of a lobulated cystic and lytic lesion involving the  anterior right maxilla as detailed above and on prior brain MRI. Lesion demonstrates multiple areas of cortical thinning and dehiscence with areas of faint mineralization suspected   along the medial margin and lateral margin to lesser degree. No significant arterial enhancement is seen associated with lesion on contemporaneous head and neck CTA. Lesion was present on study from April 2022 in retrospect, though has demonstrated   progressive growth in the interim. Again, primary diagnostic considerations would include odontogenic keratocyst, radicular cyst, atypical ameloblastoma, or atypical odontogenic myxoma and OMFS consultation is advised.  2.  No acute facial bone fracture.     CTA Head Neck with Contrast    Narrative    EXAM: CTA HEAD NECK W CONTRAST  LOCATION: [facility]  DATE: 5/5/2025    INDICATION: evaluate M2 abnormality seen on brain MRI  COMPARISON: Brain MRI: 5/4/2025. Head and neck CTA: 4/14/2022.  CONTRAST: 67 mL Isovue 370  TECHNIQUE: Head and neck CT angiogram with IV contrast. Axial helical CT images of the head and neck vessels obtained during the arterial phase of intravenous contrast administration. Axial 2D reconstructed images and multiplanar 3D MIP reconstructed   images of the head and neck vessels were performed by the technologist. Dose reduction techniques were used. All stenosis measurements made according to NASCET criteria unless otherwise specified.    FINDINGS:     HEAD CTA:  ANTERIOR CIRCULATION: No high-grade stenosis/occlusion. Calcified atherosclerosis of the carotid siphons with short segment of mild to moderate stenosis involving the proximal aspect of the left cavernous ICA segment. No greater than mild stenosis   involving the remainder. Tiny broad-based laterally directed outpouching associated with the right cavernous ICA segment with faint calcification along its margins (series 9/image 456). Short segment of aneurysmal dilatation involving the  proximal aspect   of a left MCA M2 posterior division branch measuring approximately 4 mm in diameter. Fetal origin of both posterior cerebral arteries from the anterior circulation.    POSTERIOR CIRCULATION: No high-grade stenosis/occlusion, aneurysm, or high flow vascular malformation. Balanced vertebral arteries supply a patent basilar artery. Calcified atherosclerosis of the left vertebral artery V4 segment without substantial   stenosis.    DURAL VENOUS SINUSES: Expected enhancement of the major dural venous sinuses.    NECK CTA:  RIGHT CAROTID: Mild calcified plaque scattered along the length of the common carotid artery and about the carotid bifurcation/proximal ICA without substantial (50% or greater) luminal stenosis. No evidence of dissection.    LEFT CAROTID: Mild calcified plaque about the carotid bifurcation and proximal ICA without substantial (50% or greater) luminal stenosis. No evidence of dissection.    VERTEBRAL ARTERIES: No focal high-grade stenosis or evidence of dissection. Balanced vertebral arteries.    AORTIC ARCH: Classic aortic arch anatomy. Scattered calcified plaque throughout the aortic arch without high-grade stenosis at the origins of the great vessels.    NONVASCULAR STRUCTURES: Avidly enhancing lesion with centrally necrotic appearance involving the distal left carotid sheath with slight medial displacement of the left ICA. Lesion measures 15 x 13 x 17 mm (AP by TV by cc) and is increased in size since   study performed in April 2022. Multiple small avidly enhancing nodules along the posterior aspect of the superficial lobe of the left parotid gland are similar to slightly increased in size dating back to April 2022 (series 9/images [id number]). The largest   such lesion measures 14 mm in craniocaudal dimension and is located superiorly. Please refer to contemporaneous face CT for description of lytic lesion involving the anterior right maxilla. Multilevel cervical spondylosis.       Impression    IMPRESSION:     HEAD CTA:   1.  No high-grade stenosis/occlusion involving the major intracranial arteries.  2.  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   3.  Tiny broad-based laterally directed outpouching associated with the right cavernous ICA segment with faint calcification along its margins. This may represent irregular atherosclerotic disease or a tiny sessile aneurysm.    NECK CTA:  1.  No flow-limiting stenosis/occlusion or dissection involving the major arteries of the neck.  2.  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   3.  Multiple small avidly enhancing nodules along the posterior aspect of the superficial lobe of the left parotid gland, which are similar to slightly increased in size dating back to April 2022. Lesions could represent intraparotid lymphadenopathy or   primary parotid neoplasm for example. Consider FNA for further evaluation.  4.  No evidence for additional paragangliomas in the neck or skull base.   CT Chest/Abdomen/Pelvis w Contrast    Narrative    EXAM: CT CHEST/ABDOMEN/PELVIS W CONTRAST  LOCATION: [facility]  DATE: 5/5/2025    INDICATION: evaluate for underlying malignancy  COMPARISON: 12/6/2021  TECHNIQUE: CT scan of the chest, abdomen, and pelvis was performed following injection of IV contrast. Multiplanar reformats were obtained. Dose reduction techniques were used.   CONTRAST: 67 mL Isovue 370    FINDINGS:   LUNGS AND PLEURA: Patent central airways. Mild apical predominant centrilobular and paraseptal emphysema. There are multiple bilateral upper lobe predominant and subpleural predominant solid and groundglass pulmonary nodules are identified, all of which    appear unchanged since December 2021. The largest of these measures 0.5 x 0.4 cm in lateral right upper lobe (series 6, image 163). The few ill-defined ground glass opacities and reticular scarring in the posterior lateral left upper lobe (series 6,   image 170) also appear unchanged and likely reflect areas of scarring. Additional reticular interstitial scarring is present at the bilateral lung bases. No acute appearing airspace opacities. No pleural effusions. No pneumothorax.    MEDIASTINUM/AXILLAE: Tortuous normal caliber thoracic aorta. Normal intrathoracic lymphadenopathy. Normal heart size. No pericardial effusion. Normal esophagus.    CORONARY ARTERY CALCIFICATION: Moderate to severe    HEPATOBILIARY: Left hepatic dome cyst for which no follow-up is recommended. No worrisome focal hepatic mass, biliary dilatation, or calcified gallstones.    PANCREAS: No focal pancreatic mass, peripancreatic inflammation, or pancreatic ductal dilatation.    SPLEEN: Normal size.    ADRENAL GLANDS: No nodules.    KIDNEYS/BLADDER: Bilateral subcentimeter renal hypodensities are present, which are too small to be characterized. No definite suspicious renal mass or hydronephrosis. No abnormal focal urinary bladder wall thickening.    BOWEL: Unremarkable appearance of the stomach. The small bowel loops are nondilated and grossly unremarkable. No evidence for appendicitis. Colonic diverticulosis without pericolonic inflammation.    LYMPH NODES: Abdominal or pelvic lymphadenopathy.    VASCULATURE: Normal caliber abdominal aorta. Multifocal atherosclerotic plaques are present. Patent portal, splenic, and mesenteric veins. Patent bilateral renal veins.    PELVIC ORGANS: No large pelvic mass.    MUSCULOSKELETAL: Osteopenia. Degenerative changes of the spine. No aggressive osseous lesion.      Impression    IMPRESSION:  1.  No definite findings to suggest malignancy in the chest, abdomen, or pelvis.  2.  Unchanged multiple bilateral  upper lobe predominant and subpleural predominant solid and groundglass pulmonary nodules. Given the stability, these are presumably benign.  3.  Mild emphysema.  4.  Moderate to severe coronary artery calcifications.  5.  Colonic diverticulosis.     Echocardiogram Complete     Value    LVEF  65%    [facility]    [id number]  [id number]  [id number]  [id number]^ANN-MARIE^LISETTE^POPPY     [facility]  Echocardiography Laboratory  [address] [place] [address]     Name: VIOLETA LEAVITT  MRN: [id number]  : 1950  Study Date: 2025 01:03 PM  Age: 75 yrs  Gender: Female  Patient Location: [facility]  Reason For Study: CVA  Ordering Physician: LISETTE JACOBSEN  Referring Physician: Yi Wade MD  Performed By: Justin Ritter     BSA: 1.5 m2  Height: 62 in  Weight: 116 lb  HR: 99  BP: 153/92 mmHg  ______________________________________________________________________________  Procedure  Echocardiogram with two-dimensional, color and spectral Doppler. Adequate  quality two-dimensional was performed and interpreted.  ______________________________________________________________________________  Interpretation Summary     1. Normal left ventricular size and systolic function. Estimated ejection  fraction is 65%.  2. Normal right ventricular size and systolic function.  3. Estimated right ventricular systolic pressure is 33 mmHg.  4. Aortic valve sclerosis with trivial stenosis. Aortic valve mean systolic  gradient is 10 mmHg. Mild aortic regurgitation.  5. No significant change compared to prior exam from 4/15/2022.  ______________________________________________________________________________  Left Ventricle  The left ventricle is normal in size. There is normal left ventricular wall  thickness. Left ventricular systolic function is normal. Left ventricular  diastolic function is indeterminate. The visual ejection fraction is estimated  at 65%. No regional wall motion abnormalities noted.      Right Ventricle  The right ventricle is normal in size and function.     Atria  The left atrium is moderately dilated. The right atrium is mildly dilated.  There is no color Doppler evidence of an atrial shunt.     Mitral Valve  The mitral valve leaflets are mildly thickened. There is trace mitral  regurgitation.     Tricuspid Valve  The tricuspid valve is not well visualized, but is grossly normal. There is  trace tricuspid regurgitation.     Aortic Valve  Aortic valve sclerosis with trivial stenosis. Aortic valve mean systolic  gradient is 10 mmHg. Mild aortic regurgitation.     Pulmonic Valve  The pulmonic valve is normal in structure and function.     Vessels  The aortic root is normal size. Normal size ascending aorta. The inferior vena  cava was normal in size with preserved respiratory variability.     Pericardium  Tiny insignificant anterior pericardial effusion.     Rhythm  Sinus rhythm was noted.  ______________________________________________________________________________  MMode/2D Measurements & Calculations  IVSd: 1.0 cm     LVIDd: 4.0 cm  LVIDs: 2.3 cm  LVPWd: 1.0 cm  FS: 42.5 %  LV mass(C)d: 127.1 grams  LV mass(C)dI: 83.8 grams/m2  Ao root diam: 3.8 cm  asc Aorta Diam: 3.8 cm  LVOT diam: 2.1 cm  LVOT area: 3.5 cm2  Ao root diam index Ht(cm/m): 2.4  Ao root diam index BSA (cm/m2): 2.5  Asc Ao diam index BSA (cm/m2): 2.5  Asc Ao diam index Ht(cm/m): 2.4  LA Volume (BP): 63.8 ml     LA Volume Index (BP): 42.0 ml/m2  RV Base: 2.5 cm  RWT: 0.50     Doppler Measurements & Calculations  MV E max cristela: 108.0 cm/sec  MV A max cristela: 123.0 cm/sec  MV E/A: 0.88  MV dec time: 0.18 sec  Ao V2 max: 219.6 cm/sec  Ao max P.0 mmHg  Ao V2 mean: 153.2 cm/sec  Ao mean PG: 10.3 mmHg  Ao V2 VTI: 37.4 cm  SHERLEY(I,D): 2.3 cm2  SHERLEY(V,D): 2.1 cm2  AI P1/2t: 425.9 msec  LV V1 max P.3 mmHg  LV V1 max: 135.0 cm/sec  LV V1 VTI: 25.3 cm  SV(LVOT): 87.3 ml  SI(LVOT): 57.6 ml/m2  PA acc time: 0.10 sec  TR max crsitela: 274.3  cm/sec  TR max P.1 mmHg  AV Will Ratio (DI): 0.61  SHERLEY Index (cm2/m2): 1.5     E/E' av.5  Lateral E/e': 9.6  Medial E/e': 15.3     ______________________________________________________________________________  Report approved by: Maria Alejandra Jerome MD on 2025 03:04 PM           Discharge Medications   Discharge Medication List as of 2025 12:07 PM        START taking these medications    Details   acetaminophen (TYLENOL) 325 MG tablet Take 2 tablets (650 mg) by mouth every 4 hours as needed for mild pain., OTC      aspirin (ASA) 81 MG chewable tablet Take 1 tablet (81 mg) by mouth daily., OTC      atorvastatin (LIPITOR) 40 MG tablet Take 1 tablet (40 mg) by mouth every evening., Disp-90 tablet, R-0, E-Prescribe      azithromycin (ZITHROMAX) 250 MG tablet Take 1 tablet (250 mg) by mouth daily for 2 days., Disp-2 tablet, R-0, E-Prescribe      cefdinir (OMNICEF) 300 MG capsule Take 1 capsule (300 mg) by mouth daily for 4 days., Disp-4 capsule, R-0, E-Prescribe      cyanocobalamin (CYANOCOBALAMIN) 100 MCG tablet Take 1 tablet (100 mcg) by mouth daily., OTC           CONTINUE these medications which have NOT CHANGED    Details   amLODIPine (NORVASC) 10 MG tablet Take 10 mg by mouth daily , Historical      famotidine (PEPCID) 20 MG tablet Take 20 mg by mouth 2 times daily as needed, Historical      levETIRAcetam (KEPPRA) 500 MG tablet Take 1 tablet (500 mg) by mouth 2 times daily, Disp-60 tablet, R-0, E-Prescribe      melatonin 3 MG tablet Take 3 mg by mouth nightly as needed for sleep, Historical           Allergies   Allergies   Allergen Reactions    Paroxetine Itching     tingling    Sulfa Antibiotics      Other reaction(s): *Unknown    Valium [Diazepam] Itching

## 2025-05-06 NOTE — PLAN OF CARE
Reason for Admission: Seizure, possible stroke     Cognitive/Mentation: A/Ox 4, forgetful  Neuros/CMS: Intact ex generalized weakness  VS: VSS on RA.   Tele: n/a.  /GI: Continent. Last BM 5/3.   Pulmonary: LS diminished.  Pain: denies.      Drains/Lines: PIV SL  Skin: scattered bruising.  Activity: SBA with GB.  Diet: Regular with thin liquids. Takes pills whole with water.      Therapies recs: home with outpt therapy  Discharge: pending     Aggression Stoplight Tool: green     End of shift summary: slept in between cares.

## 2025-05-06 NOTE — PLAN OF CARE
Reason for Admission: Seizure, pos stroke     Cognitive/Mentation: A/Ox 4, forgetful  Neuros/CMS: Intact ex generalized weakness  VS: VSS on RA.   Tele: n/a.  /GI: Continent. Last BM 5/6, loose    Pulmonary: LS diminished.  Pain: Had a headache in the AM, Declined Tylenol     Drains/Lines: PIV removed  Skin: scattered bruising.  Activity: Assist x SBA with GB.  Diet: Regular with thin liquids. Takes pills whole with water.      Therapies recs: home with outpt therapy  Discharge: Pt cleared for discharge. Discharge information and medication provided to pt and pt's daughter who verbalized understanding. Pt went home with daughter providing transportation.     Aggression Stoplight Tool: green

## 2025-05-07 ENCOUNTER — PATIENT OUTREACH (OUTPATIENT)
Dept: CARE COORDINATION | Facility: CLINIC | Age: 75
End: 2025-05-07
Payer: COMMERCIAL

## 2025-05-08 ENCOUNTER — PATIENT OUTREACH (OUTPATIENT)
Dept: CARE COORDINATION | Facility: CLINIC | Age: 75
End: 2025-05-08
Payer: COMMERCIAL

## 2025-05-08 LAB
BACTERIA BLD CULT: NO GROWTH
BACTERIA BLD CULT: NO GROWTH

## 2025-05-12 ENCOUNTER — PATIENT OUTREACH (OUTPATIENT)
Dept: CARE COORDINATION | Facility: CLINIC | Age: 75
End: 2025-05-12
Payer: COMMERCIAL

## 2025-05-29 LAB — CV ZIO PRELIM RESULTS: NORMAL

## 2025-05-30 ENCOUNTER — RESULTS FOLLOW-UP (OUTPATIENT)
Dept: FAMILY MEDICINE | Facility: CLINIC | Age: 75
End: 2025-05-30